# Patient Record
Sex: MALE | Race: WHITE | NOT HISPANIC OR LATINO | Employment: OTHER | URBAN - METROPOLITAN AREA
[De-identification: names, ages, dates, MRNs, and addresses within clinical notes are randomized per-mention and may not be internally consistent; named-entity substitution may affect disease eponyms.]

---

## 2017-01-27 ENCOUNTER — TRANSFERRED RECORDS (OUTPATIENT)
Dept: HEALTH INFORMATION MANAGEMENT | Facility: CLINIC | Age: 72
End: 2017-01-27

## 2017-02-09 ENCOUNTER — AMBULATORY - HEALTHEAST (OUTPATIENT)
Dept: PODIATRY | Age: 72
End: 2017-02-09

## 2017-02-09 ENCOUNTER — TRANSFERRED RECORDS (OUTPATIENT)
Dept: HEALTH INFORMATION MANAGEMENT | Facility: CLINIC | Age: 72
End: 2017-02-09

## 2017-02-09 DIAGNOSIS — M20.40 HAMMER TOE, UNSPECIFIED LATERALITY: ICD-10-CM

## 2017-02-09 DIAGNOSIS — L84 CORNS AND CALLOSITIES: ICD-10-CM

## 2017-02-09 DIAGNOSIS — M79.673 PAIN OF FOOT, UNSPECIFIED LATERALITY: ICD-10-CM

## 2017-02-09 DIAGNOSIS — L60.2 ONYCHAUXIS: ICD-10-CM

## 2017-02-10 PROBLEM — Z86.0100 HISTORY OF COLONIC POLYPS: Status: ACTIVE | Noted: 2017-02-10

## 2017-02-14 DIAGNOSIS — J20.9 ACUTE BRONCHITIS, UNSPECIFIED ORGANISM: ICD-10-CM

## 2017-02-14 RX ORDER — ALBUTEROL SULFATE 90 UG/1
2 AEROSOL, METERED RESPIRATORY (INHALATION) EVERY 4 HOURS PRN
Qty: 1 INHALER | Refills: 1 | Status: SHIPPED | OUTPATIENT
Start: 2017-02-14 | End: 2017-02-28

## 2017-02-28 ENCOUNTER — COMMUNICATION - HEALTHEAST (OUTPATIENT)
Dept: ADMINISTRATIVE | Facility: CLINIC | Age: 72
End: 2017-02-28

## 2017-02-28 DIAGNOSIS — J20.9 ACUTE BRONCHITIS, UNSPECIFIED ORGANISM: ICD-10-CM

## 2017-02-28 RX ORDER — ALBUTEROL SULFATE 90 UG/1
2 AEROSOL, METERED RESPIRATORY (INHALATION) EVERY 4 HOURS PRN
Qty: 1 INHALER | Refills: 1 | Status: SHIPPED | OUTPATIENT
Start: 2017-02-28 | End: 2020-02-13

## 2017-04-13 ENCOUNTER — TRANSFERRED RECORDS (OUTPATIENT)
Dept: HEALTH INFORMATION MANAGEMENT | Facility: CLINIC | Age: 72
End: 2017-04-13

## 2017-04-13 ENCOUNTER — AMBULATORY - HEALTHEAST (OUTPATIENT)
Dept: PODIATRY | Age: 72
End: 2017-04-13

## 2017-04-13 DIAGNOSIS — M20.40 HAMMER TOE, UNSPECIFIED LATERALITY: ICD-10-CM

## 2017-04-13 DIAGNOSIS — L84 CORNS AND CALLOSITIES: ICD-10-CM

## 2017-04-13 DIAGNOSIS — L60.2 ONYCHAUXIS: ICD-10-CM

## 2017-04-13 DIAGNOSIS — M79.673 PAIN OF FOOT, UNSPECIFIED LATERALITY: ICD-10-CM

## 2017-04-27 ENCOUNTER — AMBULATORY - HEALTHEAST (OUTPATIENT)
Dept: CARDIOLOGY | Facility: CLINIC | Age: 72
End: 2017-04-27

## 2017-05-02 ENCOUNTER — TRANSFERRED RECORDS (OUTPATIENT)
Dept: HEALTH INFORMATION MANAGEMENT | Facility: CLINIC | Age: 72
End: 2017-05-02

## 2017-05-02 ENCOUNTER — OFFICE VISIT - HEALTHEAST (OUTPATIENT)
Dept: CARDIOLOGY | Facility: CLINIC | Age: 72
End: 2017-05-02

## 2017-05-02 ENCOUNTER — AMBULATORY - HEALTHEAST (OUTPATIENT)
Dept: CARDIOLOGY | Facility: CLINIC | Age: 72
End: 2017-05-02

## 2017-05-02 DIAGNOSIS — E78.2 MIXED HYPERLIPIDEMIA: ICD-10-CM

## 2017-05-02 DIAGNOSIS — I48.0 PAROXYSMAL ATRIAL FIBRILLATION (H): ICD-10-CM

## 2017-05-02 LAB
ATRIAL RATE - MUSE: 258 BPM
DIASTOLIC BLOOD PRESSURE - MUSE: NORMAL MMHG
INTERPRETATION ECG - MUSE: NORMAL
P AXIS - MUSE: NORMAL DEGREES
PR INTERVAL - MUSE: NORMAL MS
QRS DURATION - MUSE: 90 MS
QT - MUSE: 446 MS
QTC - MUSE: 477 MS
R AXIS - MUSE: 24 DEGREES
SYSTOLIC BLOOD PRESSURE - MUSE: NORMAL MMHG
T AXIS - MUSE: 20 DEGREES
VENTRICULAR RATE- MUSE: 69 BPM

## 2017-05-02 ASSESSMENT — MIFFLIN-ST. JEOR: SCORE: 1800.14

## 2017-05-03 ENCOUNTER — HOSPITAL ENCOUNTER (OUTPATIENT)
Dept: CARDIOLOGY | Facility: CLINIC | Age: 72
Discharge: HOME | End: 2017-05-03
Attending: INTERNAL MEDICINE

## 2017-05-03 DIAGNOSIS — I48.0 PAROXYSMAL ATRIAL FIBRILLATION (H): ICD-10-CM

## 2017-05-09 ENCOUNTER — AMBULATORY - HEALTHEAST (OUTPATIENT)
Dept: CARDIOLOGY | Facility: CLINIC | Age: 72
End: 2017-05-09

## 2017-05-09 DIAGNOSIS — I48.0 PAROXYSMAL ATRIAL FIBRILLATION (H): ICD-10-CM

## 2017-05-24 ENCOUNTER — TRANSFERRED RECORDS (OUTPATIENT)
Dept: HEALTH INFORMATION MANAGEMENT | Facility: CLINIC | Age: 72
End: 2017-05-24

## 2017-05-24 ENCOUNTER — AMBULATORY - HEALTHEAST (OUTPATIENT)
Dept: CARDIOLOGY | Facility: CLINIC | Age: 72
End: 2017-05-24

## 2017-05-24 DIAGNOSIS — I48.0 PAROXYSMAL ATRIAL FIBRILLATION (H): ICD-10-CM

## 2017-05-24 LAB
ATRIAL RATE - MUSE: 416 BPM
DIASTOLIC BLOOD PRESSURE - MUSE: NORMAL MMHG
INTERPRETATION ECG - MUSE: NORMAL
P AXIS - MUSE: NORMAL DEGREES
PR INTERVAL - MUSE: NORMAL MS
QRS DURATION - MUSE: 88 MS
QT - MUSE: 442 MS
QTC - MUSE: 455 MS
R AXIS - MUSE: 11 DEGREES
SYSTOLIC BLOOD PRESSURE - MUSE: NORMAL MMHG
T AXIS - MUSE: 31 DEGREES
VENTRICULAR RATE- MUSE: 64 BPM

## 2017-05-24 ASSESSMENT — MIFFLIN-ST. JEOR: SCORE: 1800.14

## 2017-06-21 ENCOUNTER — AMBULATORY - HEALTHEAST (OUTPATIENT)
Dept: PODIATRY | Age: 72
End: 2017-06-21

## 2017-06-21 ENCOUNTER — TRANSFERRED RECORDS (OUTPATIENT)
Dept: HEALTH INFORMATION MANAGEMENT | Facility: CLINIC | Age: 72
End: 2017-06-21

## 2017-06-21 DIAGNOSIS — M79.673 PAIN OF FOOT, UNSPECIFIED LATERALITY: ICD-10-CM

## 2017-06-21 DIAGNOSIS — L60.2 ONYCHAUXIS: ICD-10-CM

## 2017-06-21 DIAGNOSIS — M20.40 HAMMER TOE, UNSPECIFIED LATERALITY: ICD-10-CM

## 2017-06-21 DIAGNOSIS — L84 CORNS AND CALLOSITIES: ICD-10-CM

## 2017-08-30 ENCOUNTER — TRANSFERRED RECORDS (OUTPATIENT)
Dept: HEALTH INFORMATION MANAGEMENT | Facility: CLINIC | Age: 72
End: 2017-08-30

## 2017-08-30 ENCOUNTER — AMBULATORY - HEALTHEAST (OUTPATIENT)
Dept: PODIATRY | Age: 72
End: 2017-08-30

## 2017-08-30 DIAGNOSIS — L60.2 ONYCHAUXIS: ICD-10-CM

## 2017-08-30 DIAGNOSIS — M20.40 HAMMER TOE, UNSPECIFIED LATERALITY: ICD-10-CM

## 2017-08-30 DIAGNOSIS — M79.673 PAIN OF FOOT, UNSPECIFIED LATERALITY: ICD-10-CM

## 2017-08-30 DIAGNOSIS — L84 CORNS AND CALLOSITIES: ICD-10-CM

## 2017-08-30 ASSESSMENT — MIFFLIN-ST. JEOR: SCORE: 1800.14

## 2017-09-19 ENCOUNTER — COMMUNICATION - HEALTHEAST (OUTPATIENT)
Dept: ADMINISTRATIVE | Facility: CLINIC | Age: 72
End: 2017-09-19

## 2017-10-18 ENCOUNTER — ALLIED HEALTH/NURSE VISIT (OUTPATIENT)
Dept: FAMILY MEDICINE | Facility: CLINIC | Age: 72
End: 2017-10-18

## 2017-10-18 VITALS — TEMPERATURE: 96.7 F

## 2017-10-18 DIAGNOSIS — Z23 NEED FOR VACCINATION: Primary | ICD-10-CM

## 2017-10-18 NOTE — NURSING NOTE
"Injectable Influenza Immunization Documentation    1.  Has the patient received the information for the injectable influenza vaccine? YES     2. Is the patient 6 months of age or older? YES     3. Does the patient have any of the following contraindications?         Severe allergy to eggs? No     Severe allergic reaction to previous influenza vaccines? No   Severe allergy to latex? No       History of Guillain-Bourbon syndrome? No     Currently have a temperature greater than 100.4F? No        4.  Severely egg allergic patients should have flu vaccine eligibility assessed by an MD, RN, or pharmacist, and those who received flu vaccine should be observed for 15 min by an MD, RN, Pharmacist, Medical Technician, or member of clinic staff.\": YES    5. Latex-allergic patients should be given latex-free influenza vaccine Yes. Please reference the Vaccine latex table to determine if your clinic s product is latex-containing.       Vaccination given by November Paw, RMA             "

## 2017-10-18 NOTE — MR AVS SNAPSHOT
After Visit Summary   10/18/2017    Giacomo Schneider    MRN: [id number]           Patient Information     Date Of Birth          1945        Visit Information        Provider Department      10/18/2017 9:40 AM [facility] FLU [facility]INIC [facility]        Today's Diagnoses     Need for vaccination    -  1       Follow-ups after your visit        Who to contact     Please call your clinic at [phone] to:    Ask questions about your health    Make or cancel appointments    Discuss your medicines    Learn about your test results    Speak to your doctor   If you have compliments or concerns about an experience at your clinic, or if you wish to file a complaint, please contact [facility] Patient Relations at [phone] or email us at [email or web address]         Additional Information About Your Visit        MyChart Information     [employer] is an electronic gateway that provides easy, online access to your medical records. With [employer], you can request a clinic appointment, read your test results, renew a prescription or communicate with your care team.     To sign up for [facility] visit the website at [email or web address]   You will be asked to enter the access code listed below, as well as some personal information. Please follow the directions to create your username and password.     Your access code is: [email or web address]  Expires: 2018 11:40 AM     Your access code will  in 90 days. If you need help or a new code, please contact your [facility] or call [phone] for assistance.        Care EveryWhere ID     This is your Care EveryWhere ID. This could be used by other organizations to access your [place] medical records  [id number]        Your Vitals Were     Temperature                   96.7  F (35.9  C) (Tympanic)            Blood Pressure from Last 3 Encounters:   16 133/85   16 137/78   11/24/15  112/74    Weight from Last 3 Encounters:   12/06/16 230 lb (104.3 kg)   06/02/16 231 lb 12.8 oz (105.1 kg)   11/24/15 231 lb (104.8 kg)              We Performed the Following     ADMIN VACCINE, INITIAL     FLU VACCINE, INCREASED ANTIGEN, PRESV FREE        Primary Care Provider Office Phone # Fax #    Giovani Ponce [phone]       [address] [address]        Equal Access to Services     ROD CONNORS : Hadii aad ku hadasho Soomaali, waaxda luqadaha, qaybta kaalmada adeegyada, waxay idiin hayaan adeeg breezy francisco . So [facility] [phone].    ATENCIÓN: Si habla español, tiene a blair disposición servicios gratuitos de asistencia lingüística. [facility] [phone].    We comply with applicable federal civil rights laws and [place] laws. We do not discriminate on the basis of race, color, national origin, age, disability, sex, sexual orientation, or gender identity.            Thank you!     Thank you for choosing [facility]  for your care. Our goal is always to provide you with excellent care. Hearing back from our patients is one way we can continue to improve our services. Please take a few minutes to complete the written survey that you may receive in the mail after your visit with us. Thank you!             Your Updated Medication List - Protect others around you: Learn how to safely use, store and throw away your medicines at www.disposemymeds.org.          This list is accurate as of: 10/18/17 11:40 AM.  Always use your most recent med list.                   Brand Name Dispense Instructions for use Diagnosis    albuterol 108 (90 BASE) MCG/ACT Inhaler    PROAIR HFA/PROVENTIL HFA/VENTOLIN HFA    1 Inhaler    Inhale 2 puffs into the lungs every 4 hours as needed for shortness of breath / dyspnea or wheezing    Acute bronchitis, unspecified organism       aspirin  MG EC tablet      Take 1 tablet daily        omeprazole 20 MG CR capsule    priLOSEC     180 capsule    Take 1 capsule (20 mg) by mouth 2 times daily    Gastroesophageal reflux disease without esophagitis       simvastatin 40 MG tablet    ZOCOR    90 tablet    Take 1 tablet (40 mg) by mouth At Bedtime    Hyperlipidemia, unspecified hyperlipidemia type       sotalol 80 MG tablet    BETAPACE     Take 1 tablet twice daily.

## 2017-11-02 ENCOUNTER — AMBULATORY - HEALTHEAST (OUTPATIENT)
Dept: PODIATRY | Age: 72
End: 2017-11-02

## 2017-11-02 DIAGNOSIS — M79.673 PAIN OF FOOT, UNSPECIFIED LATERALITY: ICD-10-CM

## 2017-11-02 DIAGNOSIS — L60.2 ONYCHAUXIS: ICD-10-CM

## 2017-11-02 DIAGNOSIS — M20.40 HAMMER TOE, UNSPECIFIED LATERALITY: ICD-10-CM

## 2017-11-02 DIAGNOSIS — L84 PRE-ULCERATIVE CORN OR CALLOUS: ICD-10-CM

## 2017-11-02 ASSESSMENT — MIFFLIN-ST. JEOR: SCORE: 1800.14

## 2017-11-22 ENCOUNTER — RECORDS - HEALTHEAST (OUTPATIENT)
Dept: ADMINISTRATIVE | Facility: OTHER | Age: 72
End: 2017-11-22

## 2017-11-30 ENCOUNTER — OFFICE VISIT - HEALTHEAST (OUTPATIENT)
Dept: CARDIOLOGY | Facility: CLINIC | Age: 72
End: 2017-11-30

## 2017-11-30 DIAGNOSIS — I10 ESSENTIAL HYPERTENSION: ICD-10-CM

## 2017-11-30 DIAGNOSIS — E78.2 MIXED HYPERLIPIDEMIA: ICD-10-CM

## 2017-11-30 DIAGNOSIS — I48.19 PERSISTENT ATRIAL FIBRILLATION (H): ICD-10-CM

## 2017-11-30 ASSESSMENT — MIFFLIN-ST. JEOR: SCORE: 1778.59

## 2018-01-11 ENCOUNTER — AMBULATORY - HEALTHEAST (OUTPATIENT)
Dept: PODIATRY | Age: 73
End: 2018-01-11

## 2018-01-11 ENCOUNTER — TRANSFERRED RECORDS (OUTPATIENT)
Dept: HEALTH INFORMATION MANAGEMENT | Facility: CLINIC | Age: 73
End: 2018-01-11

## 2018-01-11 DIAGNOSIS — M79.673 PAIN OF FOOT, UNSPECIFIED LATERALITY: ICD-10-CM

## 2018-01-11 DIAGNOSIS — L60.2 ONYCHAUXIS: ICD-10-CM

## 2018-01-11 ASSESSMENT — MIFFLIN-ST. JEOR: SCORE: 1778.59

## 2018-01-22 DIAGNOSIS — K21.9 GASTROESOPHAGEAL REFLUX DISEASE WITHOUT ESOPHAGITIS: ICD-10-CM

## 2018-01-22 DIAGNOSIS — E78.5 HYPERLIPIDEMIA, UNSPECIFIED HYPERLIPIDEMIA TYPE: ICD-10-CM

## 2018-01-22 RX ORDER — SIMVASTATIN 40 MG
40 TABLET ORAL AT BEDTIME
Qty: 90 TABLET | Refills: 3 | Status: SHIPPED | OUTPATIENT
Start: 2018-01-22 | End: 2018-04-12

## 2018-03-15 ENCOUNTER — AMBULATORY - HEALTHEAST (OUTPATIENT)
Dept: PODIATRY | Age: 73
End: 2018-03-15

## 2018-03-15 ENCOUNTER — TRANSFERRED RECORDS (OUTPATIENT)
Dept: HEALTH INFORMATION MANAGEMENT | Facility: CLINIC | Age: 73
End: 2018-03-15

## 2018-03-15 DIAGNOSIS — M20.40 HAMMER TOE, UNSPECIFIED LATERALITY: ICD-10-CM

## 2018-03-15 DIAGNOSIS — L60.2 ONYCHAUXIS: ICD-10-CM

## 2018-03-15 DIAGNOSIS — M79.673 PAIN OF FOOT, UNSPECIFIED LATERALITY: ICD-10-CM

## 2018-03-15 DIAGNOSIS — L84 PRE-ULCERATIVE CORN OR CALLOUS: ICD-10-CM

## 2018-04-12 ENCOUNTER — OFFICE VISIT (OUTPATIENT)
Dept: FAMILY MEDICINE | Facility: CLINIC | Age: 73
End: 2018-04-12
Payer: COMMERCIAL

## 2018-04-12 VITALS
HEART RATE: 69 BPM | RESPIRATION RATE: 20 BRPM | TEMPERATURE: 97.6 F | HEIGHT: 72 IN | DIASTOLIC BLOOD PRESSURE: 85 MMHG | WEIGHT: 228.4 LBS | OXYGEN SATURATION: 100 % | SYSTOLIC BLOOD PRESSURE: 145 MMHG | BODY MASS INDEX: 30.94 KG/M2

## 2018-04-12 DIAGNOSIS — K21.9 GASTROESOPHAGEAL REFLUX DISEASE WITHOUT ESOPHAGITIS: ICD-10-CM

## 2018-04-12 DIAGNOSIS — Z00.00 WELLNESS EXAMINATION: Primary | ICD-10-CM

## 2018-04-12 DIAGNOSIS — E78.5 HYPERLIPIDEMIA, UNSPECIFIED HYPERLIPIDEMIA TYPE: ICD-10-CM

## 2018-04-12 LAB
BUN SERPL-MCNC: 19.9 MG/DL (ref 7–21)
CALCIUM SERPL-MCNC: 9.7 MG/DL (ref 8.5–10.1)
CHLORIDE SERPLBLD-SCNC: 101.8 MMOL/L (ref 98–110)
CHOLEST SERPL-MCNC: 158.3 MG/DL (ref 0–200)
CHOLEST/HDLC SERPL: 3.6 {RATIO} (ref 0–5)
CO2 SERPL-SCNC: 26.5 MMOL/L (ref 20–32)
CREAT SERPL-MCNC: 1.1 MG/DL (ref 0.7–1.3)
GFR SERPL CREATININE-BSD FRML MDRD: 69.5 ML/MIN/1.7 M2
GLUCOSE SERPL-MCNC: 104.8 MG'DL (ref 70–99)
HBA1C MFR BLD: 5.9 % (ref 4.1–5.7)
HDLC SERPL-MCNC: 43.9 MG/DL
LDLC SERPL CALC-MCNC: 77 MG/DL (ref 0–129)
POTASSIUM SERPL-SCNC: 4.4 MMOL/DL (ref 3.2–4.6)
SODIUM SERPL-SCNC: 137 MMOL/L (ref 132–142)
TRIGL SERPL-MCNC: 186.3 MG/DL (ref 0–150)
VLDL CHOLESTEROL: 37.3 MG/DL (ref 7–32)

## 2018-04-12 RX ORDER — SIMVASTATIN 40 MG
40 TABLET ORAL AT BEDTIME
Qty: 90 TABLET | Refills: 3 | Status: SHIPPED | OUTPATIENT
Start: 2018-04-12 | End: 2019-05-13

## 2018-04-12 RX ORDER — METOPROLOL TARTRATE 50 MG
50 TABLET ORAL 2 TIMES DAILY
COMMUNITY
End: 2019-06-10

## 2018-04-12 NOTE — PROGRESS NOTES
Medication Management Note                                                       Giacomo was referred by Dr. Ponce for pharmacy services for proton pump inhibitor therapy taper.     MEDICATION REVIEW:  Discussed all medication indications, dosage and effectiveness, adverse effects, and adherence with patient/caregiver.    Pt had meds with them: no  Pt had med list with them: no  Pt was knowledgeable about meds: yes  Medications set up by: himself  Medications administered by someone else (e.g., LTCF): No  Pt uses a medication box or automated dispenser: yes  Called pharmacy to obtain or clarify med list:  no  Called HHN or LTC[facility] to obtain or clarify med list:  no    Medication Discrepancies  Medications on EMR med list that pt is NOT taking:  none  Medications pt IS taking that are NOT on EMR med list (e.g., from specialist, hospital): none  OTC meds/ dietary supplements pt taking on own that are NOT on EMR med list:  none  Dosage listed differently than how patient is taking: none  Frequency listed differently than how patient is taking: none  Duplicate medication on list (two occurrences of the same medication):  none  TOTAL NUMBER OF MEDICATION DISCREPANCIES:  0    Subjective                                                       Patient reports the following problems or concerns with their medications:  none  Patient reports the following adverse reactions to medications:  none  Pt reports missing doses:  1 time per month    Additional subjective information (e.g., reason for visit, frequency of PRNs, reasons meds were D/C ed):    Patient states he has minimal heartburn symptoms at this time. He is currently taking omeprazole 20 mg twice daily. He is in agreement to begin a taper of his PPI. He has been on this dose of omeprazole for roughly 10 years per chart review.     Objective                                                       Patient Active Problem List   Diagnosis     Atrial fibrillation (H)     Basal  cell carcinoma of skin     Esophageal reflux     Hyperlipidemia     Health Care Home     Obstructive sleep apnea     Spermatocele     Impaired fasting glucose     Abnormal abdominal CT scan     BPH (benign prostatic hyperplasia)     H/O cardiovascular stress test     History of colonic polyps       Current Outpatient Prescriptions   Medication Sig Dispense Refill     metoprolol tartrate (LOPRESSOR) 50 MG tablet Take 50 mg by mouth 2 times daily       simvastatin (ZOCOR) 40 MG tablet Take 1 tablet (40 mg) by mouth At Bedtime 90 tablet 3     omeprazole (PRILOSEC) 20 MG CR capsule Take 1 capsule (20 mg) by mouth daily 90 capsule 3     albuterol (PROAIR HFA/PROVENTIL HFA/VENTOLIN HFA) 108 (90 BASE) MCG/ACT Inhaler Inhale 2 puffs into the lungs every 4 hours as needed for shortness of breath / dyspnea or wheezing 1 Inhaler 1     aspirin  MG tablet Take 1 tablet daily       [DISCONTINUED] simvastatin (ZOCOR) 40 MG tablet Take 1 tablet (40 mg) by mouth At Bedtime 90 tablet 3       Social History   Substance Use Topics     Smoking status: Current Every Day Smoker     Types: Cigars     Smokeless tobacco: Never Used      Comment: one cigar per day.     Alcohol use Yes      Comment: rare       Estimated Creatinine Clearance: 72.8 mL/min (based on Cr of 1.1).    Lab Results   Component Value Date    A1C 5.9 04/12/2018    A1C 5.9 12/06/2016    A1C 5.8 01/28/2014    A1C 6.0 10/22/2012     Last Basic Metabolic Panel:  Lab Results   Component Value Date    .0 04/12/2018      Lab Results   Component Value Date    POTASSIUM 4.4 04/12/2018     Lab Results   Component Value Date    CHLORIDE 101.8 04/12/2018     Lab Results   Component Value Date    EMILY 9.7 04/12/2018     Lab Results   Component Value Date    CO2 26.5 04/12/2018     Lab Results   Component Value Date    BUN 19.9 04/12/2018     Lab Results   Component Value Date    CR 1.1 04/12/2018     Lab Results   Component Value Date    .8 04/12/2018       BP  Readings from Last 3 Encounters:   04/12/18 145/85   12/06/16 133/85   06/02/16 137/78       The 10-year ASCVD risk score ([place] ROJAS Jr, et al., 2013) is: 34.2%    Values used to calculate the score:      Age: 72 years      Sex: Male      Is Non- : No      Diabetic: No      Tobacco smoker: Yes      Systolic Blood Pressure: 145 mmHg      Is BP treated: Yes      HDL Cholesterol: 43.9 mg/dL      Total Cholesterol: 158.3 mg/dL      Assessment                                                       GERD -  controlled     Omeprazole 20 mg twice daily     Hyperlipidemia -  controlled, not optimized     Patient's 10-year ASCVD risk score is 34.2%. He's taking a moderate-intensity dose of simvastatin, no adverse effects.     Atrial fibrillation -  controlled     Sotalol 80 mg twice daily      Plan/Recommendations                                                       Updated medication list in the EMR; deleted meds patient no longer taking and added meds patient is now taking, and changed doses where there was a dose discrepancy.    All medications were reviewed and found to be indicated, effective, safe and convenient/ affordable unless drug therapy problem(s) was/were identified, as are described below.      Completed at this visit      Decrease omeprazole to 20 mg once daily       Discussed long-term PPI use    To be completed at a future visit    Smoking cessation education    Patient could benefit from PharmD referral for smoking cessation       PPI Taper    Options for treatment and/or follow-up care were reviewed with the patient.  Giacomo was engaged and actively involved in the decision making process, verbalized understanding of the options discussed, and was satisfied with the final plan.    Follow-up                                                       Patient should follow up with Dr. Ponce.  Patient was provided with written instructions/medication list via AVS.     Dr. Ponce was  provided the recommendations above  in clinic today and Dr. Ponce was available for supervision during this visit and is the authorizing prescriber for this visit through the pharmacist collaborative practice agreement.    Melanie Santoyo, PharmD Student      Drug therapy problems identified  1. Med: omeprazole - safety - dose too high - Resolution: Change dose; resolved  2. Med: nicotine patches - Indication - needs additional drug therapy - Resolution: initiate drug; deferred to future visit    # of medical conditions addressed: 3  # of medications addressed: 5  # of medication discrepancies identified: 0  # of DTP identified: 2  Time spent: 15 minutes  Level of service: 3 nc    The student acted as scribe and the encounter documented was completely performed by myself. I have reviewed and verified the student s documentation and found it to be correct and complete.  Genny Boles, Pharm.D.

## 2018-04-12 NOTE — NURSING NOTE
Medicare Wellness Visit  Health Risk Assessment        Visual Acuity:  Right Eye: 10/8   Left Eye: 10/16  Both Eyes: 10/10        FALL RISK ASSESSMENT 4/12/2018   Fallen 2 or more times in the past year? No   Any fall with injury in the past year? No            Health Risk Assessment / Review of Systems     Constitutional: Any fevers or night sweats? No     Eyes:  Vision problems   No     Hearing Do you feel you have hearing loss?   YES hard to hear in crowds/ with other noise in the room    Cardiovascular: Any chest pain, fast or irregular heart beat, calf pain with walking?     No           Respiratory:   Any breathing problems or cough?   No     Gastrointestinal: Any stomach or stool problems?   No      Genitourinary: Do you have difficulty controlling urination?   No     Muscles and Joints: Any joint stiffness or soreness?   No     Skin: Any concerning lesions or moles?   No     Nervous System: Any loss of strength or feeling, numbness or tingling, shaking, dizziness, or headache?  No     Mental Health: Any depression, anxiety or problems sleeping?    No     Cognition: Do you have any problems with your memory?  No     PHQ-2 Score:   PHQ-2 ( 1999 [employer]) 11/24/2015 9/4/2015   Q1: Little interest or pleasure in doing things 0 0   Q2: Feeling down, depressed or hopeless 0 0   PHQ-2 Score 0 0       PHQ-9 Score:   No flowsheet data found.         Medical Care     What other specialists or organizations are involved in your medical care?  Dr. Vee[facility]  Patient Care Team       Relationship Specialty Notifications Start End    Giovani Ponce MD PCP - General Family Practice  1/31/13     Phone: [phone] Fax: [phone]         [place] [address]                 Social History / Home Safety     Social History   Substance Use Topics     Smoking status: Current Every Day Smoker     Types: Cigars     Smokeless tobacco: Never Used      Comment: one cigar per day.     Alcohol use  "Yes      Comment: rare     Marital Status:  Who lives in your household? Wife    Does your home have any of the following safety concerns? Loose rugs in the hallway, no grab bars in the bathroom, no handrails on the stairs or have poorly lit areas?  No     Do you feel threatened or controlled by a partner, ex-partner or anyone in your life? No     Has anyone hurt you physically, for example by pushing, hitting, slapping or kicking you   or forcing you to have sex? No          Functional Status     Do you need help with dressing yourself, bathing, or walking?No     Do you need help with the phone, transportation, shopping, preparing meals, housework, laundry, medications or managing money?No       Risk Behaviors and Healthy Habits     History   Smoking Status     Current Every Day Smoker     Types: Cigars   Smokeless Tobacco     Never Used     Comment: one cigar per day.     How many servings of fruits and vegetables do you eat a day? 1    Exercise: 1 day/week for an average of      Do you frequently drive without a seatbelt? No     Do you use any other drugs? No         Do you use alcohol?No      Frailty Assessment            1. By yourself and note using aids, do you have difficulty walking up 10 steps without resting?  No  (1 for Yes, 0 for No)    2. By yourself and not using mobility aids, do you have any difficulty walking several hundred yards? No  (1 for Yes, 0 for No)    3. Have you lost 10 or more pounds unintentionally in the previous year? No  (If \"Yes\" and >5% weight loss, then score 1.  Score 0, if <5% weight loss or \"No\" weight loss)    4. How much of the times during the past 3 weeks did you feel tired? 5. None of the time (\"1\" or \"2\" are scored 1, others 0)    5.  A doctor told the patient they had the following illnesses:  High blood pressure (0-4 = score 0, 5-11= score 1)        Jonathan Krause, DANETTE    "

## 2018-04-12 NOTE — PROGRESS NOTES
Patient Active Problem List    Diagnosis Date Noted     History of colonic polyps 02/10/2017     Priority: Medium     Colonoscopy 1/27/2017 by [facility], Dr. Conroy, showing   Cecum:  One 2mm sessile polyp, removed  Transverse colon:  Three 3-7mm sessile polyps, removed  Sigmoid colon:  One 2mm sessile polyp, removed  Rectum:  One 4mm polyp, removed  Diverticulosis, internal hemorrhoids.         BPH (benign prostatic hyperplasia) 04/14/2015     Priority: Medium     Neg prostate bx 2009.         H/O cardiovascular stress test 04/14/2015     Priority: Medium     Negative in 2012       Impaired fasting glucose 01/28/2014     Priority: Medium     Atrial fibrillation (H) 03/20/2013     Priority: Medium     DR Vee @ [facility].         Basal cell carcinoma of skin 03/20/2013     Priority: Medium     Basal Cell Carcinoma Of The Skin 2009 (173.9); left cheek. excised by Dr Hernandez @ [facility]         Esophageal reflux 03/20/2013     Priority: Medium     Esophageal Reflux (530.81); benign endoscopy on EGD 6/22/04         Hyperlipidemia 03/20/2013     Priority: Medium     Tier 1  Diagnosis updated by automated process. Provider to review and confirm.       Health Care Home 03/20/2013     Priority: Medium     Tier 1  DX V65.8 REPLACED WITH 33475 HEALTH CARE HOME (04/08/2013)       Obstructive sleep apnea 03/20/2013     Priority: Medium     Obstructive Sleep Apnea (327.23); intolerant of cpap         Spermatocele 03/20/2013     Priority: Medium     Spermatocele (608.1); left testicle confirmed by ultrasound.          Abnormal abdominal CT scan 04/14/2015     Priority: Low     Inguinal hernias noted--planning repair fall 2015  Diverticulosis  Aortic and coronary atherosclerosis  bph       Nursing Notes:   Jonathan Krause, [facility]  4/12/2018  2:09 PM  Signed  Medicare Wellness Visit  Health Risk Assessment        Visual Acuity:  Right Eye: 10/8   Left Eye: 10/16  Both Eyes: 10/10        FALL RISK ASSESSMENT 4/12/2018   Fallen 2 or more times  in the past year? No   Any fall with injury in the past year? No            Health Risk Assessment / Review of Systems     Constitutional: Any fevers or night sweats? No     Eyes:  Vision problems   No     Hearing Do you feel you have hearing loss?   YES hard to hear in crowds/ with other noise in the room    Cardiovascular: Any chest pain, fast or irregular heart beat, calf pain with walking?     No           Respiratory:   Any breathing problems or cough?   No     Gastrointestinal: Any stomach or stool problems?   No      Genitourinary: Do you have difficulty controlling urination?   No     Muscles and Joints: Any joint stiffness or soreness?   No     Skin: Any concerning lesions or moles?   No     Nervous System: Any loss of strength or feeling, numbness or tingling, shaking, dizziness, or headache?  No     Mental Health: Any depression, anxiety or problems sleeping?    No     Cognition: Do you have any problems with your memory?  No     PHQ-2 Score:   PHQ-2 ( 1999 [employer]) 11/24/2015 9/4/2015   Q1: Little interest or pleasure in doing things 0 0   Q2: Feeling down, depressed or hopeless 0 0   PHQ-2 Score 0 0       PHQ-9 Score:   No flowsheet data found.         Medical Care     What other specialists or organizations are involved in your medical care?  Dr. Vee- [facility]  Patient Care Team       Relationship Specialty Notifications Start End    Giovani Ponce MD PCP - General Family Practice  1/31/13     Phone: [phone] Fax: [phone]         [address] [address]                 Social History / Home Safety     Social History   Substance Use Topics     Smoking status: Current Every Day Smoker     Types: Cigars     Smokeless tobacco: Never Used      Comment: one cigar per day.     Alcohol use Yes      Comment: rare     Marital Status:  Who lives in your household? Wife    Does your home have any of the following safety concerns? Loose rugs in the hallway, no grab bars  "in the bathroom, no handrails on the stairs or have poorly lit areas?  No     Do you feel threatened or controlled by a partner, ex-partner or anyone in your life? No     Has anyone hurt you physically, for example by pushing, hitting, slapping or kicking you   or forcing you to have sex? No          Functional Status     Do you need help with dressing yourself, bathing, or walking?No     Do you need help with the phone, transportation, shopping, preparing meals, housework, laundry, medications or managing money?No       Risk Behaviors and Healthy Habits     History   Smoking Status     Current Every Day Smoker     Types: Cigars   Smokeless Tobacco     Never Used     Comment: one cigar per day.     How many servings of fruits and vegetables do you eat a day? 1    Exercise: 1 day/week for an average of      Do you frequently drive without a seatbelt? No     Do you use any other drugs? No         Do you use alcohol?No      Frailty Assessment            1. By yourself and note using aids, do you have difficulty walking up 10 steps without resting?  No  (1 for Yes, 0 for No)    2. By yourself and not using mobility aids, do you have any difficulty walking several hundred yards? No  (1 for Yes, 0 for No)    3. Have you lost 10 or more pounds unintentionally in the previous year? No  (If \"Yes\" and >5% weight loss, then score 1.  Score 0, if <5% weight loss or \"No\" weight loss)    4. How much of the times during the past 3 weeks did you feel tired? 5. None of the time (\"1\" or \"2\" are scored 1, others 0)    5.  A doctor told the patient they had the following illnesses:  High blood pressure (0-4 = score 0, 5-11= score 1)        Jonathan Krause CMA    Chief Complaint   Patient presents with     Wellness Visit     Blood pressure 145/85, pulse 69, temperature 97.6  F (36.4  C), temperature source Oral, resp. rate 20, height 5' 11.5\" (181.6 cm), weight 228 lb 6.4 oz (103.6 kg), SpO2 100 %.     SUBJECTIVE:  Mehrdad Damienjanae is here for a " wellness visit.  His problem list, past medical history, family history, and social history were reviewed.  Review of systems is remarkable only for hearing loss.  He has some occasional rectal swelling, which is the most bothersome issue to him.  He has known hemorrhoids and some loose stools in general.  Otherwise, his review of systems is unremarkable.   He is active, playing softball and exercising regularly.   OBJECTIVE:   VITAL SIGNS:  Blood pressure is mildly elevated; it is borderline at 145/85.  His pulse is 69 and irregular.     HEENT:  TMs are perforated on the right.  There is mild serous fluid on the left.  Pupils are equal, round, and reactive to light.  Oropharynx is unremarkable.   NECK:  Supple, with no adenopathy or thyromegaly.   CHEST:  Clear.   HEART:  Irregularly irregular.  Pulse is 69.  No murmurs.   ABDOMEN:  Multiple well-healed scars.  His abdomen is diffusely firm, but I can't appreciate any masses.   GENITAL:  Normal descended testicles.  No hernia.   RECTAL:  He has hemorrhoids.   EXTREMITIES:  Full pulses.  No edema.  Hip and knee ROM is normal.   SKIN:  Unremarkable.   NEUROLOGIC:  Grossly nonfocal.     MUSCULOSKELETAL:  He has a mild kyphotic posture, but this is at baseline for him.   LABS:  Listed below.  His lipids are under good control.  His BMP is normal.  His A1C is mildly abnormal, but it has been stable for 5 years.   ASSESSMENT/PLAN:   1.  Medicare wellness exam.  He is actually quite well-controlled.  He has a living will at home and his wife will help him bring that into us.  No other preventive maintenance is indicated at this time.   2.  Dyslipidemia.  It is well-controlled.  I refilled simvastatin.    3.  Acid reflux.  We'll begin tapering off his PPI if possible; please see the pharmacy notes.      4.  AFib.  He has adequate rate control and is on aspirin, given low MITA-VASC score.  He'll follow up annually unless problems come up.  I encouraged him to stay active to  help prevent diabetes.       Results for orders placed or performed in visit on 04/12/18   Basic Metabolic Panel ([place])   Result Value Ref Range    Urea Nitrogen 19.9 7.0 - 21.0 mg/dL    Calcium 9.7 8.5 - 10.1 mg/dL    Chloride 101.8 98.0 - 110.0 mmol/L    Carbon Dioxide 26.5 20.0 - 32.0 mmol/L    Creatinine 1.1 0.7 - 1.3 mg/dL    Glucose 104.8 (H) 70.0 - 99.0 mg'dL    Potassium 4.4 3.2 - 4.6 mmol/dL    Sodium 137.0 132.0 - 142.0 mmol/L    GFR Estimate 69.5 >60.0 mL/min/1.7 m2    GFR Estimate If Black 84.2 >60.0 mL/min/1.7 m2   Lipid Panel ([place])   Result Value Ref Range    Cholesterol 158.3 0.0 - 200.0 mg/dL    Cholesterol/HDL Ratio 3.6 0.0 - 5.0    HDL Cholesterol 43.9 >40.0 mg/dL    LDL Cholesterol Calculated 77 0 - 129 mg/dL    Triglycerides 186.3 (H) 0.0 - 150.0 mg/dL    VLDL Cholesterol 37.3 (H) 7.0 - 32.0 mg/dL   Hemoglobin A1c ([facility])   Result Value Ref Range    Hemoglobin A1C 5.9 (H) 4.1 - 5.7 %

## 2018-04-12 NOTE — MR AVS SNAPSHOT
After Visit Summary   4/12/2018    Giacomo Schneider    MRN: [id number]           Patient Information     Date Of Birth          1945        Visit Information        Provider Department      4/12/2018 1:30 PM Giovani Ponce MD [facility]        Today's Diagnoses     Wellness examination    -  1    Hyperlipidemia, unspecified hyperlipidemia type        Gastroesophageal reflux disease without esophagitis          Care Instructions    MEDICARE PERSONAL PREVENTIVE SERVICES PLAN - IMMUNIZATIONS     Here are your recommended immunizations.  Take this home for your reference.                                                    IMMUNIZATIONS Description Recommend today?     Influenza (Flu shot) Prevents flu; should get every year No; is up to date.   PCV 13 Pneumonia vaccination; you get it once No; is up to date.   PPSV 23 Second pneumonia vaccination; usually get it 1 year after PCV 13 No; is up to date.   Zoster (Shingles) Prevents shingles; you get it once  (Check with Part D insurance for coverage, must receive at a pharmacy, not clinic) No: is not indicated today.   Tetanus Prevents tetanus; once every 10 years No; is up to date.         Kegel Exercises  Kegel exercises don t need special clothing or equipment. They re easy to learn and simple to do. And if you do them right, no one can tell you re doing them, so they can be done almost anywhere. Your healthcare provider, nurse, or physical therapist can answer any questions you have and help you get started.    A weak pelvic floor   The pelvic floor muscles may weaken due to aging, pregnancy and vaginal childbirth, injury, surgery, chronic cough, or lack of exercise. If the pelvic floor is weak, your bladder and other pelvic organs may sag out of place. The urethra may also open too easily and allow urine to leak out. Kegel exercises can help you strengthen your pelvic floor muscles. Then they can better support the pelvic organs and  control urine flow.  How Kegel exercises are done  Try each of the Kegel exercises described below. When you re doing them, try not to move your leg, buttock, or stomach muscles.    Contract as if you were stopping your urine stream. But do it when you re not urinating.    Tighten your rectum as if trying not to pass gas. Contract your anus, but don t move your buttocks.    You may place a finger or 2 in the vagina and squeeze your finger with your vagina to learn which muscles to tighten.  Try to hold each Kegel for a slow count to 5. You probably won t be able to hold them for that long at first. But keep practicing. It will get easier as your pelvic floor gets stronger. Eventually, special weights that you place in your vagina may be recommended to help make your Kegels even more effective. Visit your healthcare provider if you have difficulties doing Kegel exercises.  Helpful hints  Here are some tips to follow:    Do your Kegels as often as you can. The more you do them, the faster you ll feel the results.    Pick an activity you do often as a reminder. For instance, do your Kegels every time you sit down.    Tighten your pelvic floor before you sneeze, get up from a chair, cough, laugh, or lift. This protects your pelvic floor from injury and can help prevent urine leakage.   Date Last Reviewed: 8/5/2015 2000-2017 [employer]. [address] [place] [address]. All rights reserved. This information is not intended as a substitute for professional medical care. Always follow your healthcare professional's instructions.                Follow-ups after your visit        Who to contact     Please call your clinic at [phone] to:    Ask questions about your health    Make or cancel appointments    Discuss your medicines    Learn about your test results    Speak to your doctor            Additional Information About Your Visit        [employer] Information     [employer] is an electronic  "gateway that provides easy, online access to your medical records. With [employer], you can request a clinic appointment, read your test results, renew a prescription or communicate with your care team.     To sign up for [employer] visit the website at [email or web address]   You will be asked to enter the access code listed below, as well as some personal information. Please follow the directions to create your username and password.     Your access code is: [email or web address]  Expires: 2018  2:19 PM     Your access code will  in 90 days. If you need help or a new code, please contact your [facility] or call [phone] for assistance.        Care EveryWhere ID     This is your Care EveryWhere ID. This could be used by other organizations to access your [facility] medical records  [id number]        Your Vitals Were     Pulse Temperature Respirations Height Pulse Oximetry BMI (Body Mass Index)    69 97.6  F (36.4  C) (Oral) 20 5' 11.5\" (181.6 cm) 100% 31.41 kg/m2       Blood Pressure from Last 3 Encounters:   18 145/85   16 133/85   16 137/78    Weight from Last 3 Encounters:   18 228 lb 6.4 oz (103.6 kg)   16 230 lb (104.3 kg)   16 231 lb 12.8 oz (105.1 kg)              We Performed the Following     Basic Metabolic Panel ([place])     Hemoglobin A1c ([facility])     Lipid Panel ([place])          Today's Medication Changes          These changes are accurate as of 18  2:19 PM.  If you have any questions, ask your nurse or doctor.               These medicines have changed or have updated prescriptions.        Dose/Directions    omeprazole 20 MG CR capsule   Commonly known as:  priLOSEC   This may have changed:  when to take this   Used for:  Gastroesophageal reflux disease without esophagitis   Changed by:  Giovani Ponce MD        Dose:  20 mg   Take 1 capsule (20 mg) by mouth daily   Quantity:  90 capsule   Refills:  3       "   Stop taking these medicines if you haven't already. Please contact your care team if you have questions.     sotalol 80 MG tablet   Commonly known as:  BETAPACE   Stopped by:  Giovani Ponce MD                Where to get your medicines      These medications were sent to [facility] [address] [place] [address] [address]  [address] [address]     Phone:  [phone]     omeprazole 20 MG CR capsule    simvastatin 40 MG tablet                Primary Care Provider Office Phone # Fax #    Giovani Ponce [phone]       [address] [address]        Equal Access to Services     [facility]: Hadii aad ku hadasho Soomaali, waaxda luqadaha, qaybta kaalmada adeegyada, waxraya noein neyda francisco . [facility] [phone].    ATENCIÓN: Si habla español, tiene a blair disposición servicios gratuitos de asistencia lingüística. [facility] [phone].    We comply with applicable federal civil rights laws and [place] laws. We do not discriminate on the basis of race, color, national origin, age, disability, sex, sexual orientation, or gender identity.            Thank you!     Thank you for choosing [facility]  for your care. Our goal is always to provide you with excellent care. Hearing back from our patients is one way we can continue to improve our services. Please take a few minutes to complete the written survey that you may receive in the mail after your visit with us. Thank you!             Your Updated Medication List - Protect others around you: Learn how to safely use, store and throw away your medicines at www.disposemymeds.org.          This list is accurate as of 4/12/18  2:19 PM.  Always use your most recent med list.                   Brand Name Dispense Instructions for use Diagnosis    albuterol 108 (90 Base) MCG/ACT Inhaler    PROAIR HFA/PROVENTIL HFA/VENTOLIN HFA    1 Inhaler    Inhale 2 puffs into the  lungs every 4 hours as needed for shortness of breath / dyspnea or wheezing    Acute bronchitis, unspecified organism       aspirin 325 MG EC tablet      Take 1 tablet daily        metoprolol tartrate 50 MG tablet    LOPRESSOR     Take 50 mg by mouth 2 times daily        omeprazole 20 MG CR capsule    priLOSEC    90 capsule    Take 1 capsule (20 mg) by mouth daily    Gastroesophageal reflux disease without esophagitis       simvastatin 40 MG tablet    ZOCOR    90 tablet    Take 1 tablet (40 mg) by mouth At Bedtime    Hyperlipidemia, unspecified hyperlipidemia type

## 2018-04-12 NOTE — LETTER
April 16, 2018      Giacomo Jennie  [id number] [address] [place] [id number]        Dear Giacomo,    Your labs look good. The kidney tests and blood salts are normal. Your fasting blood sugar and AIC test indicate prediabetes, but this has not progressed at all in the past 5 years. For example, your AIC was 6.0 5 years ago.  An AIC above 6.5 is the definition of diabetes.  Your cholesterol is acceptable.  Stay on the simvastatin. I did review the notes from the cardiologist regarding your atrial fibrillation, particularly the discussion re:  taking stronger blood thinners than aspirin to reduce the risk of stroke.  To be clear, your risk of having a stroke from atrial fibrillation is high enough to recommend stronger anticoagulation than aspirin, according to current guidelines.  Current risk calculators would say that your particular risk of stroke is 2.2% per year without any blood thinners.  The aspirin you are taking is possibly beneficial, lowering that risk by 25% or so.  Stronger blood thinner, like warfarin and new anticoagulants, lower the risk by an additional 50-60% compared to aspirin, but carry a higher risk of bleeding (around 1%/year), and a need for monitoring (warfarin) or greater cost (newer anticoagulants like Xarelto or Eliquis). According to the notes from the cardiologist, you expressed a clear preference to not use stronger anticoagulants.   I think this is understandable, but I wanted to be clear about the risks and benefits of each.  If you want to talk about this more, please come in for that discussion.  In the meantime, stay on your current medications, including aspirin 325 mg daily.       Thanks for your trust.     Saurabh Ponce MD     Please see below for your test results.    Resulted Orders   Basic Metabolic Panel ([place])   Result Value Ref Range    Urea Nitrogen 19.9 7.0 - 21.0 mg/dL    Calcium 9.7 8.5 - 10.1 mg/dL    Chloride 101.8 98.0 - 110.0 mmol/L    Carbon Dioxide 26.5  20.0 - 32.0 mmol/L    Creatinine 1.1 0.7 - 1.3 mg/dL    Glucose 104.8 (H) 70.0 - 99.0 mg'dL    Potassium 4.4 3.2 - 4.6 mmol/dL    Sodium 137.0 132.0 - 142.0 mmol/L    GFR Estimate 69.5 >60.0 mL/min/1.7 m2    GFR Estimate If Black 84.2 >60.0 mL/min/1.7 m2   Lipid Panel ([place])   Result Value Ref Range    Cholesterol 158.3 0.0 - 200.0 mg/dL    Cholesterol/HDL Ratio 3.6 0.0 - 5.0    HDL Cholesterol 43.9 >40.0 mg/dL    LDL Cholesterol Calculated 77 0 - 129 mg/dL    Triglycerides 186.3 (H) 0.0 - 150.0 mg/dL    VLDL Cholesterol 37.3 (H) 7.0 - 32.0 mg/dL   Hemoglobin A1c ([facility])   Result Value Ref Range    Hemoglobin A1C 5.9 (H) 4.1 - 5.7 %

## 2018-04-12 NOTE — PATIENT INSTRUCTIONS
MEDICARE PERSONAL PREVENTIVE SERVICES PLAN - IMMUNIZATIONS     Here are your recommended immunizations.  Take this home for your reference.                                                    IMMUNIZATIONS Description Recommend today?     Influenza (Flu shot) Prevents flu; should get every year No; is up to date.   PCV 13 Pneumonia vaccination; you get it once No; is up to date.   PPSV 23 Second pneumonia vaccination; usually get it 1 year after PCV 13 No; is up to date.   Zoster (Shingles) Prevents shingles; you get it once  (Check with Part D insurance for coverage, must receive at a pharmacy, not clinic) No: is not indicated today.   Tetanus Prevents tetanus; once every 10 years No; is up to date.         Kegel Exercises  Kegel exercises don t need special clothing or equipment. They re easy to learn and simple to do. And if you do them right, no one can tell you re doing them, so they can be done almost anywhere. Your healthcare provider, nurse, or physical therapist can answer any questions you have and help you get started.    A weak pelvic floor   The pelvic floor muscles may weaken due to aging, pregnancy and vaginal childbirth, injury, surgery, chronic cough, or lack of exercise. If the pelvic floor is weak, your bladder and other pelvic organs may sag out of place. The urethra may also open too easily and allow urine to leak out. Kegel exercises can help you strengthen your pelvic floor muscles. Then they can better support the pelvic organs and control urine flow.  How Kegel exercises are done  Try each of the Kegel exercises described below. When you re doing them, try not to move your leg, buttock, or stomach muscles.    Contract as if you were stopping your urine stream. But do it when you re not urinating.    Tighten your rectum as if trying not to pass gas. Contract your anus, but don t move your buttocks.    You may place a finger or 2 in the vagina and squeeze your finger with your vagina to learn  which muscles to tighten.  Try to hold each Kegel for a slow count to 5. You probably won t be able to hold them for that long at first. But keep practicing. It will get easier as your pelvic floor gets stronger. Eventually, special weights that you place in your vagina may be recommended to help make your Kegels even more effective. Visit your healthcare provider if you have difficulties doing Kegel exercises.  Helpful hints  Here are some tips to follow:    Do your Kegels as often as you can. The more you do them, the faster you ll feel the results.    Pick an activity you do often as a reminder. For instance, do your Kegels every time you sit down.    Tighten your pelvic floor before you sneeze, get up from a chair, cough, laugh, or lift. This protects your pelvic floor from injury and can help prevent urine leakage.   Date Last Reviewed: 8/5/2015 2000-2017 [employer]. [address] [place] [address], [place] [address]. All rights reserved. This information is not intended as a substitute for professional medical care. Always follow your healthcare professional's instructions.

## 2018-04-14 NOTE — PROGRESS NOTES
No:  Please mail with labs:    Mehrdad  Your labs look good.   The kidney tests and blood salts are normal.  Your fasting blood sugar and AIC test indicate prediabetes, but this has not progressed at all in the past 5 years. For example, your AIC was 6.0 5 years ago.  An AIC above 6.5 is the definition of diabetes.    Your cholesterol is acceptable.  Stay on the simvastatin.  I did review the notes from the cardiologist regarding your atrial fibrillation, particularly the discussion re:  taking stronger blood thinners than aspirin to reduce the risk of stroke.  To be clear, your risk of having a stroke from atrial fibrillation is high enough to recommend stronger anticoagulation than aspirin, according to current guidelines.  Current risk calculators would say that your particular risk of stroke is 2.2% per year without any blood thinners.  The aspirin you are taking is possibly beneficial, lowering that risk by 25% or so.  Stronger blood thinner, like warfarin and new anticoagulants, lower the risk by an additional 50-60% compared to aspirin, but carry a higher risk of bleeding (around 1%/year), and a need for monitoring (warfarin) or greater cost (newer anticoagulants like Xarelto or Eliquis).   According to the notes from the cardiologist, you expressed a clear preference to not use stronger anticoagulants.   I think this is understandable, but I wanted to be clear about the risks and benefits of each.  If you want to talk about this more, please come in for that discussion.  In the meantime, stay on your current medications, including aspirin 325 mg daily.    Thanks for your trust.  Saurabh Ponce MD

## 2018-05-08 ENCOUNTER — COMMUNICATION - HEALTHEAST (OUTPATIENT)
Dept: ADMINISTRATIVE | Facility: CLINIC | Age: 73
End: 2018-05-08

## 2018-05-16 ENCOUNTER — AMBULATORY - HEALTHEAST (OUTPATIENT)
Dept: PODIATRY | Age: 73
End: 2018-05-16

## 2018-05-16 ENCOUNTER — TRANSFERRED RECORDS (OUTPATIENT)
Dept: HEALTH INFORMATION MANAGEMENT | Facility: CLINIC | Age: 73
End: 2018-05-16

## 2018-05-16 DIAGNOSIS — M79.673 PAIN OF FOOT, UNSPECIFIED LATERALITY: ICD-10-CM

## 2018-05-16 DIAGNOSIS — L60.2 ONYCHAUXIS: ICD-10-CM

## 2018-05-23 ENCOUNTER — COMMUNICATION - HEALTHEAST (OUTPATIENT)
Dept: ADMINISTRATIVE | Facility: CLINIC | Age: 73
End: 2018-05-23

## 2018-06-15 ENCOUNTER — TELEPHONE (OUTPATIENT)
Dept: FAMILY MEDICINE | Facility: CLINIC | Age: 73
End: 2018-06-15

## 2018-06-15 DIAGNOSIS — K21.9 GASTROESOPHAGEAL REFLUX DISEASE WITHOUT ESOPHAGITIS: ICD-10-CM

## 2018-06-15 NOTE — TELEPHONE ENCOUNTER
[facility] Medicine phone call message- general phone call:    Reason for call: Pt is calling wondering if he can go back to taking 2 capsules instead of one on medication omeprazole (PRILOSEC) 20 MG CR capsule, because his acid reflux is back.    Return call needed: Yes    OK to leave a message on voice mail? Yes    Primary language: English      needed? No    Call taken on Kecia 15, 2018 at 9:45 AM by Grisel Flores-Cardona

## 2018-06-15 NOTE — TELEPHONE ENCOUNTER
States his symptoms started to flare up about 10 days ago and he increased his omeprazole to 2 caps daily and that helped to relieve his symptoms. He would like to know if he could get a new rx with those directions. Please advise. /JOSE Landry  Routed to Dr. Ponce

## 2018-08-13 ENCOUNTER — OFFICE VISIT - HEALTHEAST (OUTPATIENT)
Dept: CARDIOLOGY | Facility: CLINIC | Age: 73
End: 2018-08-13

## 2018-08-13 DIAGNOSIS — E78.2 MIXED HYPERLIPIDEMIA: ICD-10-CM

## 2018-08-13 DIAGNOSIS — I10 ESSENTIAL HYPERTENSION: ICD-10-CM

## 2018-08-13 DIAGNOSIS — I48.19 PERSISTENT ATRIAL FIBRILLATION (H): ICD-10-CM

## 2018-08-13 ASSESSMENT — MIFFLIN-ST. JEOR: SCORE: 1801.27

## 2018-10-15 ENCOUNTER — ALLIED HEALTH/NURSE VISIT (OUTPATIENT)
Dept: FAMILY MEDICINE | Facility: CLINIC | Age: 73
End: 2018-10-15
Payer: COMMERCIAL

## 2018-10-15 VITALS — TEMPERATURE: 97.5 F

## 2018-10-15 DIAGNOSIS — Z23 NEED FOR VACCINATION: Primary | ICD-10-CM

## 2018-10-15 NOTE — NURSING NOTE
Injectable influenza vaccine documentation    1. Has the patient received the information for the influenza vaccine? YES    2. Does the patient have a severe allergy to eggs (Patients with a severe egg allergy should be assessed by a medical provider, RN, or clinical pharmacist. If they receive the influenza vaccine, please have them observed for 15 minutes.)? No    3. Has the patient had an allergic reaction to previous influenza vaccines? No    4. Has the patient had any severe allergic reactions to past influenza vaccines ? No       5. Does patient have a history of Guillain-Ferris syndrome? No      Based on responses above, I administered the influenza vaccine.  Katherine Nascimento, CMA

## 2018-10-15 NOTE — MR AVS SNAPSHOT
After Visit Summary   10/15/2018    Giacomo Schneider    MRN: [id number]           Patient Information     Date Of Birth          1945        Visit Information        Provider Department      10/15/2018 9:30 AM Nurse, [facility]        Today's Diagnoses     Need for vaccination    -  1       Follow-ups after your visit        Who to contact     Please call your clinic at [phone] to:    Ask questions about your health    Make or cancel appointments    Discuss your medicines    Learn about your test results    Speak to your doctor            Additional Information About Your Visit        Care EveryWhere ID     This is your Care EveryWhere ID. This could be used by other organizations to access your [facility] medical records  [id number]        Your Vitals Were     Temperature                   97.5  F (36.4  C) (Oral)            Blood Pressure from Last 3 Encounters:   04/12/18 145/85   12/06/16 133/85   06/02/16 137/78    Weight from Last 3 Encounters:   04/12/18 228 lb 6.4 oz (103.6 kg)   12/06/16 230 lb (104.3 kg)   06/02/16 231 lb 12.8 oz (105.1 kg)              We Performed the Following     ADMIN VACCINE, INITIAL     FLU VACCINE, INCREASED ANTIGEN, PRESV FREE        Primary Care Provider Office Phone # Fax #    Giovani Ponce [phone]       [address] [place], [place] [id number]        Equal Access to Services     ROD CONNORS AH: Hadii ankur melo hadasho Soomaali, waaxda luqadaha, qaybta kaalmada adeegyada, milli lafleur. So [facility] [phone].    ATENCIÓN: Si habla español, tiene a blair disposición servicios gratmatthewos de asistencia lingüística. ame al [phone].    We comply with applicable federal civil rights laws and [place] laws. We do not discriminate on the basis of race, color, national origin, age, disability, sex, sexual orientation, or gender identity.            Thank you!     Thank you for choosing [facility]  for  your care. Our goal is always to provide you with excellent care. Hearing back from our patients is one way we can continue to improve our services. Please take a few minutes to complete the written survey that you may receive in the mail after your visit with us. Thank you!             Your Updated Medication List - Protect others around you: Learn how to safely use, store and throw away your medicines at www.disposemymeds.org.          This list is accurate as of 10/15/18  9:37 AM.  Always use your most recent med list.                   Brand Name Dispense Instructions for use Diagnosis    albuterol 108 (90 Base) MCG/ACT inhaler    PROAIR HFA/PROVENTIL HFA/VENTOLIN HFA    1 Inhaler    Inhale 2 puffs into the lungs every 4 hours as needed for shortness of breath / dyspnea or wheezing    Acute bronchitis, unspecified organism       aspirin 325 MG EC tablet      Take 1 tablet daily        metoprolol tartrate 50 MG tablet    LOPRESSOR     Take 50 mg by mouth 2 times daily        omeprazole 20 MG CR capsule    priLOSEC    180 capsule    Take 1 capsule (20 mg) by mouth 2 times daily    Gastroesophageal reflux disease without esophagitis       simvastatin 40 MG tablet    ZOCOR    90 tablet    Take 1 tablet (40 mg) by mouth At Bedtime    Hyperlipidemia, unspecified hyperlipidemia type

## 2018-11-24 ENCOUNTER — COMMUNICATION - HEALTHEAST (OUTPATIENT)
Dept: CARDIOLOGY | Facility: CLINIC | Age: 73
End: 2018-11-24

## 2018-11-24 DIAGNOSIS — I48.19 PERSISTENT ATRIAL FIBRILLATION (H): ICD-10-CM

## 2018-11-27 ENCOUNTER — COMMUNICATION - HEALTHEAST (OUTPATIENT)
Dept: CARDIOLOGY | Facility: CLINIC | Age: 73
End: 2018-11-27

## 2018-11-27 DIAGNOSIS — I48.19 PERSISTENT ATRIAL FIBRILLATION (H): ICD-10-CM

## 2019-03-07 ENCOUNTER — OFFICE VISIT (OUTPATIENT)
Dept: FAMILY MEDICINE | Facility: CLINIC | Age: 74
End: 2019-03-07
Payer: COMMERCIAL

## 2019-03-07 ENCOUNTER — RECORDS - HEALTHEAST (OUTPATIENT)
Dept: ADMINISTRATIVE | Facility: OTHER | Age: 74
End: 2019-03-07

## 2019-03-07 VITALS
RESPIRATION RATE: 16 BRPM | OXYGEN SATURATION: 98 % | TEMPERATURE: 97.8 F | HEART RATE: 62 BPM | WEIGHT: 234.2 LBS | DIASTOLIC BLOOD PRESSURE: 81 MMHG | SYSTOLIC BLOOD PRESSURE: 149 MMHG | BODY MASS INDEX: 32.21 KG/M2

## 2019-03-07 DIAGNOSIS — R10.84 ABDOMINAL PAIN, GENERALIZED: Primary | ICD-10-CM

## 2019-03-07 LAB
% GRANULOCYTES: 75.8 %G (ref 40–75)
ALBUMIN SERPL-MCNC: 4.5 MG/DL (ref 3.3–4.9)
ALP SERPL-CCNC: 64.8 U/L (ref 40–150)
ALT SERPL-CCNC: 22.6 U/L (ref 0–45)
AST SERPL-CCNC: 23.2 U/L (ref 0–55)
BILIRUB SERPL-MCNC: 0.7 MG/DL (ref 0.2–1.3)
BILIRUBIN UR: NEGATIVE
BLOOD UR: NEGATIVE
BUN SERPL-MCNC: 15.9 MG/DL (ref 7–21)
CALCIUM SERPL-MCNC: 9.5 MG/DL (ref 8.5–10.1)
CHLORIDE SERPLBLD-SCNC: 105.4 MMOL/L (ref 98–110)
CO2 SERPL-SCNC: 24.1 MMOL/L (ref 20–32)
CREAT SERPL-MCNC: 1 MG/DL (ref 0.7–1.3)
GFR SERPL CREATININE-BSD FRML MDRD: 77.4 ML/MIN/1.7 M2
GLUCOSE SERPL-MCNC: 98.7 MG'DL (ref 70–99)
GLUCOSE URINE: NEGATIVE
GRANULOCYTES #: 5.7 K/UL (ref 1.6–8.3)
HCT VFR BLD AUTO: 46.4 % (ref 40–53)
HEMOGLOBIN: 15 G/DL (ref 13.3–17.7)
KETONES UR QL: NEGATIVE
LEUKOCYTE ESTERASE UR: NEGATIVE
LIPASE SERPL-CCNC: 9 U/L (ref 0–52)
LYMPHOCYTES # BLD AUTO: 1.3 K/UL (ref 0.8–5.3)
LYMPHOCYTES NFR BLD AUTO: 17.5 %L (ref 20–48)
MCH RBC QN AUTO: 31.8 PG (ref 26.5–35)
MCHC RBC AUTO-ENTMCNC: 32.3 G/DL (ref 32–36)
MCV RBC AUTO: 98.5 FL (ref 78–100)
MID #: 0.5 K/UL (ref 0–2.2)
MID %: 6.7 %M (ref 0–20)
NITRITE UR QL STRIP: NEGATIVE
PH UR STRIP: 5 [PH] (ref 5–7)
PLATELET # BLD AUTO: 185 K/UL (ref 150–450)
POTASSIUM SERPL-SCNC: 4.6 MMOL/DL (ref 3.2–4.6)
PROT SERPL-MCNC: 6.9 G/DL (ref 6.8–8.8)
PROTEIN UR: NEGATIVE
RBC # BLD AUTO: 4.7 M/UL (ref 4.4–5.9)
SODIUM SERPL-SCNC: 137.3 MMOL/L (ref 132–142)
SP GR UR STRIP: 1.02
UROBILINOGEN UR STRIP-ACNC: NORMAL
WBC # BLD AUTO: 7.5 K/UL (ref 4–11)

## 2019-03-07 NOTE — PATIENT INSTRUCTIONS
2019    [facility] Radiology  [phone]  Fax Orders to [phone]    [address] [address]    Appointment:  2019  Arrival Time:  1:40pm    Per  Yaneth nothing to eat or drink 2 hours prior to appointment.  Patient given instructions no further questions at this time. Edith JARRETT    Please bring a copy of your insurance card and photo ID    If you cannot make this appointment please call [phone] to reschedule

## 2019-03-07 NOTE — PROGRESS NOTES
Patient Active Problem List    Diagnosis Date Noted     History of colonic polyps 02/10/2017     Priority: Medium     [facility] showing   Cecum:  One 2mm sessile polyp, removed  Transverse colon:  Three 3-7mm sessile polyps, removed  Sigmoid colon:  One 2mm sessile polyp, removed  Rectum:  One 4mm polyp, removed  Diverticulosis, internal hemorrhoids.         BPH (benign prostatic hyperplasia) 04/14/2015     Priority: Medium     Neg prostate bx 2009.         H/O cardiovascular stress test 04/14/2015     Priority: Medium     Negative in 2012       Impaired fasting glucose 01/28/2014     Priority: Medium     Atrial fibrillation (H) 03/20/2013     Priority: Medium     DR Vee @ [facility].    Failed sotalol conversion  [id number] 2 as of 2018.  Declined anticoagulation.  Taking ASA       Basal cell carcinoma of skin 03/20/2013     Priority: Medium     Basal Cell Carcinoma Of The Skin 2009 (173.9); left cheek. excised by Dr Hernandez @ [facility]         Esophageal reflux 03/20/2013     Priority: Medium     Esophageal Reflux (530.81); benign endoscopy on EGD 6/22/04         Hyperlipidemia 03/20/2013     Priority: Medium     Tier 1  Diagnosis updated by automated process. Provider to review and confirm.       Health Care Home 03/20/2013     Priority: Medium     Tier 1  DX V65.8 REPLACED WITH 55412 HEALTH CARE HOME (04/08/2013)       Obstructive sleep apnea 03/20/2013     Priority: Medium     Obstructive Sleep Apnea (327.23); intolerant of cpap         Spermatocele 03/20/2013     Priority: Medium     Spermatocele (608.1); left testicle confirmed by ultrasound.          Abnormal abdominal CT scan 04/14/2015     Priority: Low     Inguinal hernias noted--planning repair fall 2015  Diverticulosis  Aortic and coronary atherosclerosis  bph       There are no exam notes on file for this visit.  Chief Complaint   Patient presents with     Abdominal Pain     stomach for the past 3 weeks. Pt feels full all  the time     Blood pressure 149/81, pulse 62, temperature 97.8  F (36.6  C), temperature source Oral, resp. rate 16, weight 106.2 kg (234 lb 3.2 oz), SpO2 98 %.  Pain in epigrastrium--concerned that may be   Early satiety.  Achy up on top.  Played 2 games and didn't affect swing  No wt loss.  No vomiting.  occas diarrhea controlled w/ imodium.  No constipation.  No bloody urine or dysuria  Nocturia once.  EGD 2004 negative    chiro worked on back--better    SUBJECTIVE:  Mehrdad Schneider is here for abdominal pain.  He has vague epigastric discomfort with early satiety.  He also has, on top of that, an achy feeling on the top of the stomach as well.  There has been no weight loss or vomiting.  He has occasional diarrhea that is controlled with Imodium.  No constipation.  No dysuria, hematuria, or jaundice.  He has nocturia x1.  He has history of reflux, and an EGD was negative.  He does take aspirin and is on a PPI chronically.  He has a history of diverticulosis that was seen on a CT in the past.  He has had prior hernia surgery.   He is retired.  He is very active physically and plays softball.  The twisting and moving in softball hasn't affected his pain.  He has tried working with a chiropractor to help what he felt was musculoskeletal pain.  This seemed to help some, but his abdominal pain is persistent and has been there for 2-3 weeks.   OBJECTIVE:     GENERAL:  Patient is alert, pleasant, and in no acute distress.   VITAL SIGNS:  Blood pressure is high normal.  Pulse is regular at 62.   ABDOMEN:  Soft, with normoactive bowel sounds.  He does have a small diastasis recti.  He has diffuse tenderness to palpation, but no guarding or rebound. No hepatosplenomegaly.   LABS:  CBC, UA, UC, and CMP are all essentially normal.   ASSESSMENT/PLAN:   1.  Abdominal pain of unclear etiology, with early satiety.  We want to make sure that this isn't a pancreatic or hepatic lesion.  We'll get a CT, which is scheduled for 03/08.  If  there are severe findings  I would refer to the appropriate surgeon.  If there are no findings I'll set him up for an EGD, as early satiety could be a presentation for gastritis or a finding which isn't well seen on CT.  We'll continue the PPI and aspirin for now.  We'll call him with results 03/08.

## 2019-03-08 ENCOUNTER — HOSPITAL ENCOUNTER (OUTPATIENT)
Dept: CT IMAGING | Facility: CLINIC | Age: 74
Discharge: HOME | End: 2019-03-08
Attending: FAMILY MEDICINE

## 2019-03-08 DIAGNOSIS — R10.84 ABDOMINAL PAIN, GENERALIZED: ICD-10-CM

## 2019-03-08 LAB
CREAT BLD-MCNC: 1.1 MG/DL
POC GFR AMER AF HE - HISTORICAL: >60 ML/MIN/1.73M2
POC GFR NON AMER AF HE - HISTORICAL: >60 ML/MIN/1.73M2

## 2019-03-08 NOTE — RESULT ENCOUNTER NOTE
I called:    His blood and urine tests were all very normal--good news.  We will contact him after his CT test today

## 2019-03-12 DIAGNOSIS — R06.09 DYSPNEA ON EXERTION: ICD-10-CM

## 2019-03-12 DIAGNOSIS — R10.13 ABDOMINAL PAIN, EPIGASTRIC: Primary | ICD-10-CM

## 2019-03-12 NOTE — PROGRESS NOTES
[facility]  [address] [place], [place] [address]  Phone: [phone]  Fax: [phone]    March 12, 2019 at 2:20 pm Referral, demographics, office visit and medication list faxed to [phone]. Ana Caro CMA

## 2019-03-12 NOTE — PROGRESS NOTES
Spoke with pt.  Still has epigastric discomfort.  Has chest pain that is not necessarily exertional.  Has dyspnea with extreme exertion that is not new.    CT shows no clear abd pathology and extensive atherosclerosis of coronary arteries.    Plan:  1.  Will get stress echo  2.  Will arrange for EGD.    Duglas

## 2019-03-12 NOTE — PROGRESS NOTES
GASTROENTEROLOGY ADULT REF PROCEDURE ONLY Other; [place] [facility] [phone]  March 12, 2019 at 1:47 pm Online referral placed with [facility] who will contact patient to schedule.   [place] [facility]  Phone [phone]  Fax: [phone]  [facility] [address]

## 2019-03-13 ENCOUNTER — RECORDS - HEALTHEAST (OUTPATIENT)
Dept: ADMINISTRATIVE | Facility: OTHER | Age: 74
End: 2019-03-13

## 2019-03-18 NOTE — RESULT ENCOUNTER NOTE
I called him w/ results.  We are ordering an EGD to assess his epigastric pain and early satiety, and a stress test in light of his non-specific sx and coronary calcification.  PAOLA Ponce

## 2019-04-02 ENCOUNTER — TRANSFERRED RECORDS (OUTPATIENT)
Dept: HEALTH INFORMATION MANAGEMENT | Facility: CLINIC | Age: 74
End: 2019-04-02

## 2019-04-03 ENCOUNTER — TELEPHONE (OUTPATIENT)
Dept: FAMILY MEDICINE | Facility: CLINIC | Age: 74
End: 2019-04-03

## 2019-04-03 DIAGNOSIS — J01.10 ACUTE NON-RECURRENT FRONTAL SINUSITIS: Primary | ICD-10-CM

## 2019-04-03 NOTE — TELEPHONE ENCOUNTER
[facility] Family Medicine phone call message- general phone call:    Reason for call: He needs a call back re a terrible sinus headache he needs an antibiotic prescribed.Please give a call back asap    Return call needed: Yes    OK to leave a message on voice mail? Yes    Primary language: English      needed? No    Call taken on April 3, 2019 at 9:37 AM by Alessia Hearn

## 2019-04-03 NOTE — TELEPHONE ENCOUNTER
"Spoke with patient who stated that he has had a \"horrible HA\" for the past 2-3 days where he is not able to sleep.  He states that his nose is congested but is able to blow out clear drainage plus at times \"it just runs\". He denies fever or cough/chest congestion.  He has tried ASA for his HA but is not wanting to take anything like sudafed d/t his hx of a-fib.    Did triage for stroke like symptoms d/t c/o HA but denies all symptoms of.   Patient states he has tried hot and cold compresses, nasal spray but with no relief.  Would like to know if there is something else he should be trying.    Routed to Dr. Ponce/MARTY Frazier RN       "

## 2019-04-04 RX ORDER — AMOXICILLIN 500 MG/1
1000 CAPSULE ORAL 2 TIMES DAILY
Qty: 28 CAPSULE | Refills: 0 | Status: SHIPPED | OUTPATIENT
Start: 2019-04-04 | End: 2019-05-13

## 2019-04-04 NOTE — TELEPHONE ENCOUNTER
Called and spoke with patient.  He continues with sinus congestion and HA as noted previously  He is wondering if there is something that Dr. Ponce can prescribe.    Routed to Dr. Ponce/MARTY Frazier RN

## 2019-04-04 NOTE — TELEPHONE ENCOUNTER
He is calling back re this message he is really feeling horrible and he needs this something prescribed.

## 2019-04-04 NOTE — TELEPHONE ENCOUNTER
Cold/sinus HA.  Since mon.  Clear.  Dripping.  HA--cold packs frontal.  A:  Possible sinusitis  Plan  Amox 1 g bid for 7 da  Afrin and claritin otc.

## 2019-04-08 ENCOUNTER — HOSPITAL ENCOUNTER (OUTPATIENT)
Dept: CARDIOLOGY | Facility: CLINIC | Age: 74
Discharge: HOME | End: 2019-04-08
Attending: FAMILY MEDICINE

## 2019-04-08 ENCOUNTER — TRANSFERRED RECORDS (OUTPATIENT)
Dept: HEALTH INFORMATION MANAGEMENT | Facility: CLINIC | Age: 74
End: 2019-04-08

## 2019-04-08 DIAGNOSIS — R06.09 DOE (DYSPNEA ON EXERTION): ICD-10-CM

## 2019-04-08 DIAGNOSIS — R06.09 OTHER FORMS OF DYSPNEA: ICD-10-CM

## 2019-04-08 DIAGNOSIS — R94.39 ABNORMAL STRESS TEST: Primary | ICD-10-CM

## 2019-04-08 DIAGNOSIS — R94.39 ABNORMAL CARDIOVASCULAR STRESS TEST: ICD-10-CM

## 2019-04-08 LAB
CV STRESS CURRENT BP HE: NORMAL
CV STRESS CURRENT HR HE: 107
CV STRESS CURRENT HR HE: 109
CV STRESS CURRENT HR HE: 112
CV STRESS CURRENT HR HE: 122
CV STRESS CURRENT HR HE: 125
CV STRESS CURRENT HR HE: 126
CV STRESS CURRENT HR HE: 126
CV STRESS CURRENT HR HE: 128
CV STRESS CURRENT HR HE: 135
CV STRESS CURRENT HR HE: 137
CV STRESS CURRENT HR HE: 71
CV STRESS CURRENT HR HE: 73
CV STRESS CURRENT HR HE: 73
CV STRESS CURRENT HR HE: 74
CV STRESS CURRENT HR HE: 77
CV STRESS CURRENT HR HE: 78
CV STRESS CURRENT HR HE: 79
CV STRESS CURRENT HR HE: 80
CV STRESS CURRENT HR HE: 81
CV STRESS CURRENT HR HE: 82
CV STRESS CURRENT HR HE: 82
CV STRESS DEVIATION TIME HE: NORMAL
CV STRESS ECHO PERCENT HR HE: NORMAL
CV STRESS EXERCISE STAGE HE: NORMAL
CV STRESS FINAL RESTING BP HE: NORMAL
CV STRESS FINAL RESTING HR HE: 73
CV STRESS MAX HR HE: 149
CV STRESS MAX TREADMILL GRADE HE: 12
CV STRESS MAX TREADMILL SPEED HE: 2.5
CV STRESS PEAK DIA BP HE: NORMAL
CV STRESS PEAK SYS BP HE: NORMAL
CV STRESS PHASE HE: NORMAL
CV STRESS PROTOCOL HE: NORMAL
CV STRESS RESTING PT POSITION HE: NORMAL
CV STRESS ST DEVIATION AMOUNT HE: NORMAL
CV STRESS ST DEVIATION ELEVATION HE: NORMAL
CV STRESS ST EVELATION AMOUNT HE: NORMAL
CV STRESS TEST TYPE HE: NORMAL
CV STRESS TOTAL STAGE TIME MIN 1 HE: NORMAL
ECHO EJECTION FRACTION ESTIMATED: 60 %
STRESS ECHO BASELINE BP: NORMAL
STRESS ECHO BASELINE HR: 69
STRESS ECHO CALCULATED PERCENT HR: 103 %
STRESS ECHO LAST STRESS BP: NORMAL
STRESS ECHO LAST STRESS HR: 135
STRESS ECHO POST ESTIMATED WORKLOAD: 6.4
STRESS ECHO POST EXERCISE DUR MIN: 4
STRESS ECHO POST EXERCISE DUR SEC: 30
STRESS ECHO TARGET HR: 123

## 2019-04-10 ENCOUNTER — TRANSFERRED RECORDS (OUTPATIENT)
Dept: HEALTH INFORMATION MANAGEMENT | Facility: CLINIC | Age: 74
End: 2019-04-10

## 2019-04-10 ENCOUNTER — OFFICE VISIT - HEALTHEAST (OUTPATIENT)
Dept: CARDIOLOGY | Facility: CLINIC | Age: 74
End: 2019-04-10

## 2019-04-10 ENCOUNTER — RECORDS - HEALTHEAST (OUTPATIENT)
Dept: ADMINISTRATIVE | Facility: OTHER | Age: 74
End: 2019-04-10

## 2019-04-10 DIAGNOSIS — E78.2 MIXED HYPERLIPIDEMIA: ICD-10-CM

## 2019-04-10 DIAGNOSIS — I48.19 PERSISTENT ATRIAL FIBRILLATION (H): ICD-10-CM

## 2019-04-10 DIAGNOSIS — R94.39 ABNORMAL CARDIOVASCULAR STRESS TEST: ICD-10-CM

## 2019-04-10 DIAGNOSIS — R06.09 DOE (DYSPNEA ON EXERTION): ICD-10-CM

## 2019-04-10 DIAGNOSIS — I10 ESSENTIAL HYPERTENSION: ICD-10-CM

## 2019-04-10 DIAGNOSIS — G47.30 SLEEP APNEA, UNSPECIFIED TYPE: ICD-10-CM

## 2019-04-10 ASSESSMENT — MIFFLIN-ST. JEOR: SCORE: 1813.65

## 2019-04-16 ENCOUNTER — COMMUNICATION - HEALTHEAST (OUTPATIENT)
Dept: CARDIOLOGY | Facility: CLINIC | Age: 74
End: 2019-04-16

## 2019-04-18 ENCOUNTER — HOSPITAL ENCOUNTER (OUTPATIENT)
Dept: CT IMAGING | Facility: CLINIC | Age: 74
Discharge: HOME | End: 2019-04-18
Attending: INTERNAL MEDICINE

## 2019-04-18 DIAGNOSIS — R94.39 ABNORMAL CARDIOVASCULAR STRESS TEST: ICD-10-CM

## 2019-04-18 LAB
BSA FOR ECHO PROCEDURE: 2.31 M2
CREAT BLD-MCNC: 1.2 MG/DL (ref 0.7–1.3)
CV CALCIUM SCORE AGATSTON LM: 148
CV CALCIUM SCORING AGATSON LAD: 2396
CV CALCIUM SCORING AGATSTON CX: 247
CV CALCIUM SCORING AGATSTON RCA: 923
CV CALCIUM SCORING AGATSTON TOTAL: 3714
GFR SERPL CREATININE-BSD FRML MDRD: 59 ML/MIN/1.73M2
LEFT VENTRICLE HEART RATE: 64 BPM

## 2019-04-18 ASSESSMENT — MIFFLIN-ST. JEOR: SCORE: 1810.35

## 2019-04-19 ENCOUNTER — AMBULATORY - HEALTHEAST (OUTPATIENT)
Dept: CARDIOLOGY | Facility: CLINIC | Age: 74
End: 2019-04-19

## 2019-04-19 ENCOUNTER — COMMUNICATION - HEALTHEAST (OUTPATIENT)
Dept: CARDIOLOGY | Facility: CLINIC | Age: 74
End: 2019-04-19

## 2019-04-19 ENCOUNTER — SURGERY - HEALTHEAST (OUTPATIENT)
Dept: CARDIOLOGY | Facility: CLINIC | Age: 74
End: 2019-04-19

## 2019-04-19 DIAGNOSIS — R93.89 ABNORMAL COMPUTED TOMOGRAPHY ANGIOGRAPHY (CTA): ICD-10-CM

## 2019-04-19 DIAGNOSIS — E78.2 MIXED HYPERLIPIDEMIA: ICD-10-CM

## 2019-04-19 DIAGNOSIS — R93.1 ELEVATED CORONARY ARTERY CALCIUM SCORE: ICD-10-CM

## 2019-04-22 ENCOUNTER — TRANSFERRED RECORDS (OUTPATIENT)
Dept: HEALTH INFORMATION MANAGEMENT | Facility: CLINIC | Age: 74
End: 2019-04-22

## 2019-04-22 ENCOUNTER — HOSPITAL ENCOUNTER (OUTPATIENT)
Dept: CARDIOLOGY | Facility: CLINIC | Age: 74
Discharge: HOME | End: 2019-04-22
Attending: INTERNAL MEDICINE | Admitting: INTERNAL MEDICINE
Payer: MEDICARE

## 2019-04-22 ENCOUNTER — SURGERY - HEALTHEAST (OUTPATIENT)
Dept: CARDIOLOGY | Facility: CLINIC | Age: 74
End: 2019-04-22

## 2019-04-22 DIAGNOSIS — I25.10 CORONARY ARTERY DISEASE INVOLVING NATIVE CORONARY ARTERY OF NATIVE HEART WITHOUT ANGINA PECTORIS: ICD-10-CM

## 2019-04-22 DIAGNOSIS — R93.89 ABNORMAL COMPUTED TOMOGRAPHY ANGIOGRAPHY (CTA): ICD-10-CM

## 2019-04-22 DIAGNOSIS — E78.2 MIXED HYPERLIPIDEMIA: ICD-10-CM

## 2019-04-22 DIAGNOSIS — R93.1 ELEVATED CORONARY ARTERY CALCIUM SCORE: ICD-10-CM

## 2019-04-22 LAB
ANION GAP SERPL CALCULATED.3IONS-SCNC: 10 MMOL/L (ref 5–18)
ATRIAL RATE - MUSE: 70 BPM
BASOPHILS # BLD AUTO: 0 THOU/UL (ref 0–0.2)
BASOPHILS NFR BLD AUTO: 0 % (ref 0–2)
BUN SERPL-MCNC: 17 MG/DL (ref 8–28)
CALCIUM SERPL-MCNC: 9.3 MG/DL (ref 8.5–10.5)
CHLORIDE BLD-SCNC: 109 MMOL/L (ref 98–107)
CHOLEST SERPL-MCNC: 135 MG/DL
CO2 SERPL-SCNC: 22 MMOL/L (ref 22–31)
CREAT SERPL-MCNC: 0.96 MG/DL (ref 0.7–1.3)
DIASTOLIC BLOOD PRESSURE - MUSE: NORMAL MMHG
EOSINOPHIL # BLD AUTO: 0.1 THOU/UL (ref 0–0.4)
EOSINOPHIL NFR BLD AUTO: 1 % (ref 0–6)
ERYTHROCYTE [DISTWIDTH] IN BLOOD BY AUTOMATED COUNT: 13.5 % (ref 11–14.5)
FASTING STATUS PATIENT QL REPORTED: YES
GFR SERPL CREATININE-BSD FRML MDRD: >60 ML/MIN/1.73M2
GLUCOSE BLD-MCNC: 111 MG/DL (ref 70–125)
HCT VFR BLD AUTO: 45.5 % (ref 40–54)
HDLC SERPL-MCNC: 42 MG/DL
HGB BLD-MCNC: 14.8 G/DL (ref 14–18)
INTERPRETATION ECG - MUSE: NORMAL
LDLC SERPL CALC-MCNC: 78 MG/DL
LYMPHOCYTES # BLD AUTO: 1.1 THOU/UL (ref 0.8–4.4)
LYMPHOCYTES NFR BLD AUTO: 13 % (ref 20–40)
MCH RBC QN AUTO: 30 PG (ref 27–34)
MCHC RBC AUTO-ENTMCNC: 32.5 G/DL (ref 32–36)
MCV RBC AUTO: 92 FL (ref 80–100)
MONOCYTES # BLD AUTO: 0.5 THOU/UL (ref 0–0.9)
MONOCYTES NFR BLD AUTO: 7 % (ref 2–10)
NEUTROPHILS # BLD AUTO: 6.4 THOU/UL (ref 2–7.7)
NEUTROPHILS NFR BLD AUTO: 79 % (ref 50–70)
P AXIS - MUSE: NORMAL DEGREES
PLATELET # BLD AUTO: 211 THOU/UL (ref 140–440)
PMV BLD AUTO: 10.5 FL (ref 8.5–12.5)
POTASSIUM BLD-SCNC: 4.4 MMOL/L (ref 3.5–5)
PR INTERVAL - MUSE: NORMAL MS
QRS DURATION - MUSE: 90 MS
QT - MUSE: 434 MS
QTC - MUSE: 418 MS
R AXIS - MUSE: 18 DEGREES
RBC # BLD AUTO: 4.94 MILL/UL (ref 4.4–6.2)
SODIUM SERPL-SCNC: 141 MMOL/L (ref 136–145)
SYSTOLIC BLOOD PRESSURE - MUSE: NORMAL MMHG
T AXIS - MUSE: 14 DEGREES
TRIGL SERPL-MCNC: 74 MG/DL
VENTRICULAR RATE- MUSE: 56 BPM
WBC: 8.1 THOU/UL (ref 4–11)

## 2019-04-22 ASSESSMENT — MIFFLIN-ST. JEOR
SCORE: 1817.15
SCORE: 1817.15

## 2019-04-23 ENCOUNTER — OFFICE VISIT - HEALTHEAST (OUTPATIENT)
Dept: CARDIOLOGY | Facility: CLINIC | Age: 74
End: 2019-04-23

## 2019-04-23 ENCOUNTER — TRANSFERRED RECORDS (OUTPATIENT)
Dept: HEALTH INFORMATION MANAGEMENT | Facility: CLINIC | Age: 74
End: 2019-04-23

## 2019-04-23 DIAGNOSIS — I25.798 CORONARY ARTERY DISEASE OF OTHER BYPASS GRAFT WITH STABLE ANGINA PECTORIS (H): ICD-10-CM

## 2019-04-23 ASSESSMENT — MIFFLIN-ST. JEOR: SCORE: 1833.81

## 2019-04-30 ENCOUNTER — HOSPITAL ENCOUNTER (OUTPATIENT)
Dept: SURGERY | Facility: CLINIC | Age: 74
Discharge: HOME | End: 2019-04-30
Admitting: REGISTERED NURSE

## 2019-04-30 ENCOUNTER — SURGERY - HEALTHEAST (OUTPATIENT)
Dept: CARDIOLOGY | Facility: CLINIC | Age: 74
End: 2019-04-30

## 2019-04-30 ENCOUNTER — HOSPITAL ENCOUNTER (OUTPATIENT)
Dept: CARDIOLOGY | Facility: CLINIC | Age: 74
Discharge: HOME | End: 2019-04-30

## 2019-04-30 ENCOUNTER — HOSPITAL ENCOUNTER (INPATIENT)
Dept: CARDIOLOGY | Facility: CLINIC | Age: 74
Discharge: HOME | End: 2019-05-06
Attending: THORACIC SURGERY (CARDIOTHORACIC VASCULAR SURGERY) | Admitting: THORACIC SURGERY (CARDIOTHORACIC VASCULAR SURGERY)
Payer: MEDICARE

## 2019-04-30 ENCOUNTER — HOSPITAL ENCOUNTER (OUTPATIENT)
Dept: RADIOLOGY | Facility: CLINIC | Age: 74
Discharge: HOME | End: 2019-04-30

## 2019-04-30 ENCOUNTER — ANESTHESIA - HEALTHEAST (OUTPATIENT)
Dept: SURGERY | Facility: CLINIC | Age: 74
End: 2019-04-30

## 2019-04-30 DIAGNOSIS — R94.39 ABNORMAL CARDIOVASCULAR STRESS TEST: ICD-10-CM

## 2019-04-30 DIAGNOSIS — R93.1 ELEVATED CORONARY ARTERY CALCIUM SCORE: ICD-10-CM

## 2019-04-30 DIAGNOSIS — Z95.1 S/P CABG (CORONARY ARTERY BYPASS GRAFT): ICD-10-CM

## 2019-04-30 LAB
ABO/RH(D): NORMAL
ALBUMIN UR-MCNC: NEGATIVE MG/DL
ANION GAP SERPL CALCULATED.3IONS-SCNC: 9 MMOL/L (ref 5–18)
ANTIBODY SCREEN: NEGATIVE
AORTIC ROOT: 3.5 CM
AORTIC VALVE MEAN VELOCITY: 78.8 CM/S
APPEARANCE UR: CLEAR
ASCENDING AORTA: 3.5 CM
ATRIAL RATE - MUSE: 535 BPM
AV DIMENSIONLESS INDEX VTI: 0.7
AV MEAN GRADIENT: 3 MMHG
AV PEAK GRADIENT: 5.4 MMHG
AV VALVE AREA: 2.6 CM2
AV VELOCITY RATIO: 0.6
BILIRUB UR QL STRIP: NEGATIVE
BSA FOR ECHO PROCEDURE: 2.31 M2
BUN SERPL-MCNC: 17 MG/DL (ref 8–28)
CALCIUM SERPL-MCNC: 10 MG/DL (ref 8.5–10.5)
CHLORIDE BLD-SCNC: 106 MMOL/L (ref 98–107)
CO2 SERPL-SCNC: 22 MMOL/L (ref 22–31)
COLOR UR AUTO: YELLOW
CREAT SERPL-MCNC: 0.97 MG/DL (ref 0.7–1.3)
CV BLOOD PRESSURE: NORMAL MMHG
CV ECHO HEIGHT: 72 IN
CV ECHO WEIGHT: 232 LBS
DIASTOLIC BLOOD PRESSURE - MUSE: NORMAL MMHG
DOP CALC AO PEAK VEL: 116 CM/S
DOP CALC AO VTI: 24.1 CM
DOP CALC LVOT AREA: 3.8 CM2
DOP CALC LVOT DIAMETER: 2.2 CM
DOP CALC LVOT PEAK VEL: 69.9 CM/S
DOP CALC LVOT STROKE VOLUME: 63.8 CM3
DOP CALCLVOT PEAK VEL VTI: 16.8 CM
ECHO EJECTION FRACTION ESTIMATED: 55 %
EJECTION FRACTION: 54 % (ref 55–75)
ERYTHROCYTE [DISTWIDTH] IN BLOOD BY AUTOMATED COUNT: 13.2 % (ref 11–14.5)
FRACTIONAL SHORTENING: 37.7 % (ref 28–44)
GFR SERPL CREATININE-BSD FRML MDRD: >60 ML/MIN/1.73M2
GLUCOSE BLD-MCNC: 103 MG/DL (ref 70–125)
GLUCOSE UR STRIP-MCNC: NEGATIVE MG/DL
HBA1C MFR BLD: 6.3 % (ref 4.2–6.1)
HCT VFR BLD AUTO: 46.4 % (ref 40–54)
HGB BLD-MCNC: 15.3 G/DL (ref 14–18)
HGB UR QL STRIP: NEGATIVE
INR PPP: 1.04 (ref 0.9–1.1)
INTERPRETATION ECG - MUSE: NORMAL
INTERVENTRICULAR SEPTUM IN END DIASTOLE: 1 CM (ref 0.6–1)
IVS/PW RATIO: 1
KETONES UR STRIP-MCNC: NEGATIVE MG/DL
LA AREA 1: 25.8 CM2
LA AREA 2: 30.6 CM2
LEFT ATRIUM LENGTH: 6.17 CM
LEFT ATRIUM SIZE: 4.8 CM
LEFT ATRIUM TO AORTIC ROOT RATIO: 1.37 NO UNITS
LEFT ATRIUM VOLUME INDEX: 47.1 ML/M2
LEFT ATRIUM VOLUME: 108.8 ML
LEFT VENTRICLE CARDIAC INDEX: 2.1 L/MIN/M2
LEFT VENTRICLE CARDIAC OUTPUT: 4.9 L/MIN
LEFT VENTRICLE DIASTOLIC VOLUME INDEX: 49.4 CM3/M2 (ref 34–74)
LEFT VENTRICLE DIASTOLIC VOLUME: 114 CM3 (ref 62–150)
LEFT VENTRICLE HEART RATE: 76 BPM
LEFT VENTRICLE MASS INDEX: 86.8 G/M2
LEFT VENTRICLE SYSTOLIC VOLUME INDEX: 22.9 CM3/M2 (ref 11–31)
LEFT VENTRICLE SYSTOLIC VOLUME: 53 CM3 (ref 21–61)
LEFT VENTRICULAR INTERNAL DIMENSION IN DIASTOLE: 5.3 CM (ref 4.2–5.8)
LEFT VENTRICULAR INTERNAL DIMENSION IN SYSTOLE: 3.3 CM (ref 2.5–4)
LEFT VENTRICULAR MASS: 200.4 G
LEFT VENTRICULAR OUTFLOW TRACT MEAN GRADIENT: 1 MMHG
LEFT VENTRICULAR OUTFLOW TRACT MEAN VELOCITY: 47.5 CM/S
LEFT VENTRICULAR OUTFLOW TRACT PEAK GRADIENT: 2 MMHG
LEFT VENTRICULAR POSTERIOR WALL IN END DIASTOLE: 1 CM (ref 0.6–1)
LEUKOCYTE ESTERASE UR QL STRIP: NEGATIVE
LV STROKE VOLUME INDEX: 27.6 ML/M2
MAGNESIUM SERPL-MCNC: 2.1 MG/DL (ref 1.8–2.6)
MCH RBC QN AUTO: 29.9 PG (ref 27–34)
MCHC RBC AUTO-ENTMCNC: 33 G/DL (ref 32–36)
MCV RBC AUTO: 91 FL (ref 80–100)
MV AVERAGE E/E' RATIO: 8.1 CM/S
MV DECELERATION TIME: 201 MS
MV E'TISSUE VEL-LAT: 12.7 CM/S
MV E'TISSUE VEL-MED: 7.17 CM/S
MV LATERAL E/E' RATIO: 6.3
MV MEDIAL E/E' RATIO: 11.2
MV PEAK E VELOCITY: 80 CM/S
NITRATE UR QL: NEGATIVE
NUC REST DIASTOLIC VOLUME INDEX: 3710.4 LBS
NUC REST SYSTOLIC VOLUME INDEX: 72 IN
P AXIS - MUSE: NORMAL DEGREES
PH UR STRIP: 5 [PH] (ref 4.5–8)
PLATELET # BLD AUTO: 193 THOU/UL (ref 140–440)
PMV BLD AUTO: 10.4 FL (ref 8.5–12.5)
POTASSIUM BLD-SCNC: 4.5 MMOL/L (ref 3.5–5)
PR INTERVAL - MUSE: NORMAL MS
PREALB SERPL-MCNC: 23.4 MG/DL (ref 19–38)
QRS DURATION - MUSE: 88 MS
QT - MUSE: 412 MS
QTC - MUSE: 390 MS
R AXIS - MUSE: 27 DEGREES
RBC # BLD AUTO: 5.11 MILL/UL (ref 4.4–6.2)
SODIUM SERPL-SCNC: 137 MMOL/L (ref 136–145)
SP GR UR STRIP: 1.01 (ref 1–1.03)
SYSTOLIC BLOOD PRESSURE - MUSE: NORMAL MMHG
T AXIS - MUSE: 23 DEGREES
TRICUSPID REGURGITATION PEAK PRESSURE GRADIENT: 29.4 MMHG
TRICUSPID VALVE ANULAR PLANE SYSTOLIC EXCURSION: 1.8 CM
TRICUSPID VALVE PEAK REGURGITANT VELOCITY: 271 CM/S
UROBILINOGEN UR STRIP-ACNC: NORMAL
VENTRICULAR RATE- MUSE: 54 BPM
WBC: 7.4 THOU/UL (ref 4–11)

## 2019-04-30 ASSESSMENT — MIFFLIN-ST. JEOR
SCORE: 1809.89
SCORE: 1818

## 2019-05-01 ENCOUNTER — DOCUMENTATION ONLY (OUTPATIENT)
Dept: OTHER | Facility: CLINIC | Age: 74
End: 2019-05-01

## 2019-05-01 ENCOUNTER — TRANSFERRED RECORDS (OUTPATIENT)
Dept: HEALTH INFORMATION MANAGEMENT | Facility: CLINIC | Age: 74
End: 2019-05-01

## 2019-05-01 ENCOUNTER — SURGERY - HEALTHEAST (OUTPATIENT)
Dept: SURGERY | Facility: CLINIC | Age: 74
End: 2019-05-01

## 2019-05-01 ENCOUNTER — AMBULATORY - HEALTHEAST (OUTPATIENT)
Dept: OTHER | Facility: CLINIC | Age: 74
End: 2019-05-01

## 2019-05-01 LAB
ANION GAP SERPL CALCULATED.3IONS-SCNC: 9 MMOL/L (ref 5–18)
APTT PPP: 37 SECONDS (ref 24–37)
BACTERIA SPEC CULT: NORMAL
BASE EXCESS BLDA CALC-SCNC: -0.9 MMOL/L
BASE EXCESS BLDA CALC-SCNC: -1.4 MMOL/L
BASE EXCESS BLDA CALC-SCNC: -2.3 MMOL/L
BASE EXCESS BLDV CALC-SCNC: 0 MMOL/L
BASE EXCESS BLDV CALC-SCNC: 1 MMOL/L
BASE EXCESS BLDV CALC-SCNC: 1 MMOL/L
BASOPHILS # BLD AUTO: 0 THOU/UL (ref 0–0.2)
BASOPHILS NFR BLD AUTO: 0 % (ref 0–2)
BUN SERPL-MCNC: 18 MG/DL (ref 8–28)
CA-I BLD-MCNC: 1.02 MMOL/L (ref 1.11–1.3)
CA-I BLD-MCNC: 1.05 MMOL/L (ref 1.11–1.3)
CA-I BLD-MCNC: 1.05 MMOL/L (ref 1.11–1.3)
CA-I BLD-MCNC: 1.18 MMOL/L (ref 1.11–1.3)
CA-I BLD-MCNC: 1.45 MMOL/L (ref 1.11–1.3)
CALCIUM SERPL-MCNC: 8.9 MG/DL (ref 8.5–10.5)
CALCIUM, IONIZED MEASURED: 1.15 MMOL/L (ref 1.11–1.3)
CHLORIDE BLD-SCNC: 112 MMOL/L (ref 98–107)
CO2 BLD-SCNC: 27 MMOL/L
CO2 BLD-SCNC: 28 MMOL/L
CO2 BLD-SCNC: 29 MMOL/L
CO2 SERPL-SCNC: 19 MMOL/L (ref 22–31)
COHGB MFR BLD: 99.5 % (ref 95–96)
CREAT SERPL-MCNC: 0.99 MG/DL (ref 0.7–1.3)
EOSINOPHIL # BLD AUTO: 0.1 THOU/UL (ref 0–0.4)
EOSINOPHIL NFR BLD AUTO: 1 % (ref 0–6)
ERYTHROCYTE [DISTWIDTH] IN BLOOD BY AUTOMATED COUNT: 13.3 % (ref 11–14.5)
ERYTHROCYTE [DISTWIDTH] IN BLOOD BY AUTOMATED COUNT: 13.3 % (ref 11–14.5)
FIBRINOGEN PPP-MCNC: 276 MG/DL (ref 170–410)
FLOW: ABNORMAL LPM
GFR SERPL CREATININE-BSD FRML MDRD: >60 ML/MIN/1.73M2
GLUCOSE BLD-MCNC: 123 MG/DL (ref 70–125)
GLUCOSE BLD-MCNC: 137 MG/DL (ref 70–125)
GLUCOSE BLD-MCNC: 140 MG/DL (ref 70–125)
GLUCOSE BLD-MCNC: 141 MG/DL (ref 70–125)
GLUCOSE BLD-MCNC: 145 MG/DL (ref 70–125)
GLUCOSE BLD-MCNC: 155 MG/DL (ref 70–125)
GLUCOSE BLDC GLUCOMTR-MCNC: 105 MG/DL (ref 70–139)
GLUCOSE BLDC GLUCOMTR-MCNC: 117 MG/DL (ref 70–139)
GLUCOSE BLDC GLUCOMTR-MCNC: 125 MG/DL (ref 70–139)
GLUCOSE BLDC GLUCOMTR-MCNC: 132 MG/DL (ref 70–139)
GLUCOSE BLDC GLUCOMTR-MCNC: 137 MG/DL (ref 70–139)
GLUCOSE BLDC GLUCOMTR-MCNC: 141 MG/DL (ref 70–139)
GLUCOSE BLDC GLUCOMTR-MCNC: 142 MG/DL (ref 70–139)
GLUCOSE BLDC GLUCOMTR-MCNC: 149 MG/DL (ref 70–139)
GLUCOSE BLDC GLUCOMTR-MCNC: 152 MG/DL (ref 70–139)
GLUCOSE BLDC GLUCOMTR-MCNC: 160 MG/DL (ref 70–139)
GLUCOSE BLDC GLUCOMTR-MCNC: 164 MG/DL (ref 70–139)
HCO3 BLDA-SCNC: 25.5 MMOL/L (ref 23–29)
HCO3 BLDA-SCNC: 26 MMOL/L (ref 23–29)
HCO3 BLDA-SCNC: 26.1 MMOL/L (ref 23–29)
HCO3 BLDA-SCNC: 26.6 MMOL/L (ref 23–29)
HCO3 BLDV-SCNC: 27 MMOL/L (ref 24–30)
HCO3, ARTERIAL CALC - HISTORICAL: 23.1 MMOL/L (ref 23–29)
HCO3, ARTERIAL CALC - HISTORICAL: 23.9 MMOL/L (ref 23–29)
HCO3, ARTERIAL CALC - HISTORICAL: 24.3 MMOL/L (ref 23–29)
HCT VFR BLD AUTO: 32.8 % (ref 40–54)
HCT VFR BLD AUTO: 32.9 % (ref 40–54)
HCT VFR BLD CALC: 32 % (ref 40–54)
HCT VFR BLD CALC: 32 % (ref 40–54)
HCT VFR BLD CALC: 33 % (ref 40–54)
HCT VFR BLD CALC: 33 % (ref 40–54)
HCT VFR BLD CALC: 40 % (ref 40–54)
HGB BLD-MCNC: 10.8 G/DL (ref 14–18)
HGB BLD-MCNC: 10.9 G/DL (ref 14–18)
HGB BLD-MCNC: 11.2 G/DL (ref 14–18)
HGB BLD-MCNC: 11.2 G/DL (ref 14–18)
HGB BLD-MCNC: 13.6 G/DL (ref 14–18)
INR PPP: 1.37 (ref 0.9–1.1)
INR PPP: 1.51 (ref 0.9–1.1)
ION CA PH 7.4: 1.17 MMOL/L (ref 1.11–1.3)
LYMPHOCYTES # BLD AUTO: 1.1 THOU/UL (ref 0.8–4.4)
LYMPHOCYTES NFR BLD AUTO: 9 % (ref 20–40)
MAGNESIUM SERPL-MCNC: 2.4 MG/DL (ref 1.8–2.6)
MCH RBC QN AUTO: 30.4 PG (ref 27–34)
MCH RBC QN AUTO: 30.5 PG (ref 27–34)
MCHC RBC AUTO-ENTMCNC: 32.8 G/DL (ref 32–36)
MCHC RBC AUTO-ENTMCNC: 33.2 G/DL (ref 32–36)
MCV RBC AUTO: 91 FL (ref 80–100)
MCV RBC AUTO: 93 FL (ref 80–100)
MONOCYTES # BLD AUTO: 0.8 THOU/UL (ref 0–0.9)
MONOCYTES NFR BLD AUTO: 6 % (ref 2–10)
NEUTROPHILS # BLD AUTO: 10.4 THOU/UL (ref 2–7.7)
NEUTROPHILS NFR BLD AUTO: 84 % (ref 50–70)
O2/TOTAL GAS SETTING VFR VENT: 1 %
O2/TOTAL GAS SETTING VFR VENT: 40 %
O2/TOTAL GAS SETTING VFR VENT: ABNORMAL %
OXYHEMOGLOBIN - HISTORICAL: 97.4 % (ref 95–96)
OXYHEMOGLOBIN - HISTORICAL: 97.4 % (ref 95–96)
OXYHEMOGLOBIN - HISTORICAL: 97.9 % (ref 95–96)
PCO2 BLD: 29 MM HG (ref 35–45)
PCO2 BLD: 35 MM HG (ref 35–45)
PCO2 BLD: 37 MM HG (ref 35–45)
PCO2 BLDA: 42.3 MMHG (ref 35–45)
PCO2 BLDA: 42.7 MMHG (ref 35–45)
PCO2 BLDA: 43.4 MMHG (ref 35–45)
PCO2 BLDA: 51.9 MMHG (ref 35–45)
PCO2 BLDV: 57.1 MMHG (ref 35–50)
PEEP: 5 CM H2O
PEEP: 5 CM H2O
PH BLD: 7.41 [PH] (ref 7.37–7.44)
PH BLD: 7.42 [PH] (ref 7.37–7.44)
PH BLD: 7.46 [PH] (ref 7.37–7.44)
PH BLDA: 7.32 [PH] (ref 7.37–7.44)
PH BLDA: 7.38 [PH] (ref 7.37–7.44)
PH BLDA: 7.39 [PH] (ref 7.37–7.44)
PH BLDA: 7.4 [PH] (ref 7.37–7.44)
PH BLDV: 7.28 [PH] (ref 7.35–7.45)
PH: 7.45 (ref 7.35–7.45)
PLATELET # BLD AUTO: 112 THOU/UL (ref 140–440)
PLATELET # BLD AUTO: 123 THOU/UL (ref 140–440)
PMV BLD AUTO: 10.6 FL (ref 8.5–12.5)
PMV BLD AUTO: 11.2 FL (ref 8.5–12.5)
PO2 BLD: 140 MM HG (ref 75–85)
PO2 BLD: 184 MM HG (ref 75–85)
PO2 BLD: 258 MM HG (ref 75–85)
PO2 BLDA: 391 MMHG (ref 75–85)
PO2 BLDA: 397 MMHG (ref 75–85)
PO2 BLDA: 416 MMHG (ref 75–85)
PO2 BLDA: 423 MMHG (ref 75–85)
PO2 BLDV: 57 MMHG (ref 25–47)
POTASSIUM BLD-SCNC: 4 MMOL/L (ref 3.5–5)
POTASSIUM BLD-SCNC: 4 MMOL/L (ref 3.5–5)
POTASSIUM BLD-SCNC: 4.1 MMOL/L (ref 3.5–5)
POTASSIUM BLD-SCNC: 5.1 MMOL/L (ref 3.5–5)
POTASSIUM BLD-SCNC: 5.3 MMOL/L (ref 3.5–5)
POTASSIUM BLD-SCNC: 5.3 MMOL/L (ref 3.5–5)
PSV (LAB BLOOD GAS): 5 CM H2O
RATE: 16 RR/MIN
RBC # BLD AUTO: 3.54 MILL/UL (ref 4.4–6.2)
RBC # BLD AUTO: 3.59 MILL/UL (ref 4.4–6.2)
SAO2 % BLDV: 85 % (ref 70–75)
SAT POCT - HISTORICAL: 100 % (ref 95–96)
SODIUM BLD-SCNC: 140 MMOL/L (ref 136–145)
SODIUM BLD-SCNC: 141 MMOL/L (ref 136–145)
SODIUM SERPL-SCNC: 140 MMOL/L (ref 136–145)
TEMPERATURE: 37 DEGREES C
VENTILATION MODE: ABNORMAL
VENTILATION MODE: ABNORMAL
VENTILATOR TIDAL VOLUME: 600 ML
WBC: 12.5 THOU/UL (ref 4–11)
WBC: 13.9 THOU/UL (ref 4–11)

## 2019-05-01 ASSESSMENT — MIFFLIN-ST. JEOR
SCORE: 1792.65
SCORE: 1792.65

## 2019-05-02 LAB
ANION GAP SERPL CALCULATED.3IONS-SCNC: 9 MMOL/L (ref 5–18)
ATRIAL RATE - MUSE: 74 BPM
BASOPHILS # BLD AUTO: 0 THOU/UL (ref 0–0.2)
BASOPHILS NFR BLD AUTO: 0 % (ref 0–2)
BUN SERPL-MCNC: 19 MG/DL (ref 8–28)
CALCIUM SERPL-MCNC: 8.9 MG/DL (ref 8.5–10.5)
CALCIUM, IONIZED MEASURED: 1.15 MMOL/L (ref 1.11–1.3)
CHLORIDE BLD-SCNC: 113 MMOL/L (ref 98–107)
CO2 SERPL-SCNC: 19 MMOL/L (ref 22–31)
CREAT SERPL-MCNC: 1.07 MG/DL (ref 0.7–1.3)
DIASTOLIC BLOOD PRESSURE - MUSE: NORMAL MMHG
EOSINOPHIL # BLD AUTO: 0 THOU/UL (ref 0–0.4)
EOSINOPHIL NFR BLD AUTO: 0 % (ref 0–6)
ERYTHROCYTE [DISTWIDTH] IN BLOOD BY AUTOMATED COUNT: 13.7 % (ref 11–14.5)
GFR SERPL CREATININE-BSD FRML MDRD: >60 ML/MIN/1.73M2
GLUCOSE BLD-MCNC: 108 MG/DL (ref 70–125)
GLUCOSE BLDC GLUCOMTR-MCNC: 103 MG/DL (ref 70–139)
GLUCOSE BLDC GLUCOMTR-MCNC: 104 MG/DL (ref 70–139)
GLUCOSE BLDC GLUCOMTR-MCNC: 121 MG/DL (ref 70–139)
GLUCOSE BLDC GLUCOMTR-MCNC: 122 MG/DL (ref 70–139)
GLUCOSE BLDC GLUCOMTR-MCNC: 127 MG/DL (ref 70–139)
GLUCOSE BLDC GLUCOMTR-MCNC: 135 MG/DL (ref 70–139)
GLUCOSE BLDC GLUCOMTR-MCNC: 156 MG/DL (ref 70–139)
GLUCOSE BLDC GLUCOMTR-MCNC: 164 MG/DL (ref 70–139)
GLUCOSE BLDC GLUCOMTR-MCNC: 168 MG/DL (ref 70–139)
GLUCOSE BLDC GLUCOMTR-MCNC: 96 MG/DL (ref 70–139)
GLUCOSE BLDC GLUCOMTR-MCNC: 97 MG/DL (ref 70–139)
GLUCOSE BLDC GLUCOMTR-MCNC: 97 MG/DL (ref 70–139)
HCT VFR BLD AUTO: 33.4 % (ref 40–54)
HGB BLD-MCNC: 10.8 G/DL (ref 14–18)
INR PPP: 1.46 (ref 0.9–1.1)
INTERPRETATION ECG - MUSE: NORMAL
ION CA PH 7.4: 1.14 MMOL/L (ref 1.11–1.3)
LAB AP CHARGES (HE HISTORICAL CONVERSION): NORMAL
LYMPHOCYTES # BLD AUTO: 0.6 THOU/UL (ref 0.8–4.4)
LYMPHOCYTES NFR BLD AUTO: 7 % (ref 20–40)
MAGNESIUM SERPL-MCNC: 2 MG/DL (ref 1.8–2.6)
MCH RBC QN AUTO: 30.4 PG (ref 27–34)
MCHC RBC AUTO-ENTMCNC: 32.3 G/DL (ref 32–36)
MCV RBC AUTO: 94 FL (ref 80–100)
MONOCYTES # BLD AUTO: 1.1 THOU/UL (ref 0–0.9)
MONOCYTES NFR BLD AUTO: 11 % (ref 2–10)
NEUTROPHILS # BLD AUTO: 7.8 THOU/UL (ref 2–7.7)
NEUTROPHILS NFR BLD AUTO: 82 % (ref 50–70)
P AXIS - MUSE: 76 DEGREES
PATH REPORT.COMMENTS IMP SPEC: NORMAL
PATH REPORT.FINAL DX SPEC: NORMAL
PATH REPORT.GROSS SPEC: NORMAL
PATH REPORT.MICROSCOPIC SPEC OTHER STN: NORMAL
PATH REPORT.RELEVANT HX SPEC: NORMAL
PH: 7.38 (ref 7.35–7.45)
PLATELET # BLD AUTO: 117 THOU/UL (ref 140–440)
PMV BLD AUTO: 11 FL (ref 8.5–12.5)
POTASSIUM BLD-SCNC: 4.3 MMOL/L (ref 3.5–5)
PR INTERVAL - MUSE: 200 MS
QRS DURATION - MUSE: 102 MS
QT - MUSE: 408 MS
QTC - MUSE: 452 MS
R AXIS - MUSE: -3 DEGREES
RBC # BLD AUTO: 3.55 MILL/UL (ref 4.4–6.2)
RESULT FLAG (HE HISTORICAL CONVERSION): NORMAL
SODIUM SERPL-SCNC: 141 MMOL/L (ref 136–145)
SYSTOLIC BLOOD PRESSURE - MUSE: NORMAL MMHG
T AXIS - MUSE: 12 DEGREES
VENTRICULAR RATE- MUSE: 74 BPM
WBC: 9.6 THOU/UL (ref 4–11)

## 2019-05-02 ASSESSMENT — MIFFLIN-ST. JEOR
SCORE: 1831.66
SCORE: 1831.66

## 2019-05-03 LAB
GLUCOSE BLDC GLUCOMTR-MCNC: 102 MG/DL (ref 70–139)
GLUCOSE BLDC GLUCOMTR-MCNC: 110 MG/DL (ref 70–139)
GLUCOSE BLDC GLUCOMTR-MCNC: 111 MG/DL (ref 70–139)
GLUCOSE BLDC GLUCOMTR-MCNC: 112 MG/DL (ref 70–139)
MAGNESIUM SERPL-MCNC: 2.1 MG/DL (ref 1.8–2.6)
PLATELET # BLD AUTO: 111 THOU/UL (ref 140–440)
POTASSIUM BLD-SCNC: 4.5 MMOL/L (ref 3.5–5)

## 2019-05-03 ASSESSMENT — MIFFLIN-ST. JEOR
SCORE: 1833.02
SCORE: 1833.02

## 2019-05-04 LAB
ANION GAP SERPL CALCULATED.3IONS-SCNC: 10 MMOL/L (ref 5–18)
BLD PROD TYP BPU: NORMAL
BLD PROD TYP BPU: NORMAL
BLOOD EXPIRATION DATE: NORMAL
BLOOD EXPIRATION DATE: NORMAL
BLOOD TYPE: 6200
BLOOD TYPE: 6200
BUN SERPL-MCNC: 21 MG/DL (ref 8–28)
CALCIUM SERPL-MCNC: 9.3 MG/DL (ref 8.5–10.5)
CHLORIDE BLD-SCNC: 95 MMOL/L (ref 98–107)
CO2 SERPL-SCNC: 25 MMOL/L (ref 22–31)
CODING SYSTEM: NORMAL
CODING SYSTEM: NORMAL
COMPONENT (HISTORICAL CONVERSION): NORMAL
COMPONENT (HISTORICAL CONVERSION): NORMAL
CREAT SERPL-MCNC: 0.99 MG/DL (ref 0.7–1.3)
CROSSMATCH: NORMAL
CROSSMATCH: NORMAL
GFR SERPL CREATININE-BSD FRML MDRD: >60 ML/MIN/1.73M2
GLUCOSE BLD-MCNC: 109 MG/DL (ref 70–125)
GLUCOSE BLDC GLUCOMTR-MCNC: 102 MG/DL (ref 70–139)
GLUCOSE BLDC GLUCOMTR-MCNC: 117 MG/DL (ref 70–139)
GLUCOSE BLDC GLUCOMTR-MCNC: 149 MG/DL (ref 70–139)
GLUCOSE BLDC GLUCOMTR-MCNC: 90 MG/DL (ref 70–139)
MAGNESIUM SERPL-MCNC: 2.2 MG/DL (ref 1.8–2.6)
POTASSIUM BLD-SCNC: 3.8 MMOL/L (ref 3.5–5)
SODIUM SERPL-SCNC: 130 MMOL/L (ref 136–145)
STATUS (HISTORICAL CONVERSION): NORMAL
STATUS (HISTORICAL CONVERSION): NORMAL
UNIT ABO/RH (HISTORICAL CONVERSION): NORMAL
UNIT ABO/RH (HISTORICAL CONVERSION): NORMAL
UNIT NUMBER: NORMAL
UNIT NUMBER: NORMAL

## 2019-05-04 ASSESSMENT — MIFFLIN-ST. JEOR
SCORE: 1830.3
SCORE: 1830.3

## 2019-05-05 LAB
ANION GAP SERPL CALCULATED.3IONS-SCNC: 11 MMOL/L (ref 5–18)
BUN SERPL-MCNC: 23 MG/DL (ref 8–28)
CALCIUM SERPL-MCNC: 9.5 MG/DL (ref 8.5–10.5)
CHLORIDE BLD-SCNC: 95 MMOL/L (ref 98–107)
CO2 SERPL-SCNC: 25 MMOL/L (ref 22–31)
CREAT SERPL-MCNC: 1.04 MG/DL (ref 0.7–1.3)
GFR SERPL CREATININE-BSD FRML MDRD: >60 ML/MIN/1.73M2
GLUCOSE BLD-MCNC: 125 MG/DL (ref 70–125)
GLUCOSE BLDC GLUCOMTR-MCNC: 101 MG/DL (ref 70–139)
GLUCOSE BLDC GLUCOMTR-MCNC: 114 MG/DL (ref 70–139)
GLUCOSE BLDC GLUCOMTR-MCNC: 119 MG/DL (ref 70–139)
GLUCOSE BLDC GLUCOMTR-MCNC: 90 MG/DL (ref 70–139)
MAGNESIUM SERPL-MCNC: 2.3 MG/DL (ref 1.8–2.6)
PLATELET # BLD AUTO: 177 THOU/UL (ref 140–440)
POTASSIUM BLD-SCNC: 4 MMOL/L (ref 3.5–5)
SODIUM SERPL-SCNC: 131 MMOL/L (ref 136–145)

## 2019-05-05 ASSESSMENT — MIFFLIN-ST. JEOR
SCORE: 1790.84
SCORE: 1790.84

## 2019-05-06 ENCOUNTER — HOME CARE/HOSPICE - HEALTHEAST (OUTPATIENT)
Dept: HOME HEALTH SERVICES | Facility: HOME HEALTH | Age: 74
End: 2019-05-06

## 2019-05-06 LAB
GLUCOSE BLDC GLUCOMTR-MCNC: 100 MG/DL (ref 70–139)
GLUCOSE BLDC GLUCOMTR-MCNC: 118 MG/DL (ref 70–139)
POTASSIUM BLD-SCNC: 3.6 MMOL/L (ref 3.5–5)

## 2019-05-06 ASSESSMENT — MIFFLIN-ST. JEOR
SCORE: 1787.67
SCORE: 1787.67

## 2019-05-13 ENCOUNTER — OFFICE VISIT (OUTPATIENT)
Dept: FAMILY MEDICINE | Facility: CLINIC | Age: 74
End: 2019-05-13
Payer: COMMERCIAL

## 2019-05-13 ENCOUNTER — OFFICE VISIT (OUTPATIENT)
Dept: PHARMACY | Facility: CLINIC | Age: 74
End: 2019-05-13
Payer: COMMERCIAL

## 2019-05-13 VITALS
TEMPERATURE: 97.6 F | RESPIRATION RATE: 16 BRPM | SYSTOLIC BLOOD PRESSURE: 118 MMHG | WEIGHT: 227.6 LBS | BODY MASS INDEX: 31.3 KG/M2 | DIASTOLIC BLOOD PRESSURE: 78 MMHG | HEART RATE: 76 BPM | OXYGEN SATURATION: 97 %

## 2019-05-13 DIAGNOSIS — Z95.1 STATUS POST CORONARY ARTERY BYPASS GRAFT: Primary | ICD-10-CM

## 2019-05-13 DIAGNOSIS — F41.9 ANXIETY: Primary | ICD-10-CM

## 2019-05-13 DIAGNOSIS — Z95.1 STATUS POST CORONARY ARTERY BYPASS GRAFT: ICD-10-CM

## 2019-05-13 DIAGNOSIS — I48.91 ATRIAL FIBRILLATION, UNSPECIFIED TYPE (H): ICD-10-CM

## 2019-05-13 RX ORDER — AMIODARONE HYDROCHLORIDE 200 MG/1
200 TABLET ORAL 2 TIMES DAILY
COMMUNITY
Start: 2019-05-13 | End: 2019-05-15

## 2019-05-13 RX ORDER — HYDROCORTISONE VALERATE 2 MG/G
1 OINTMENT TOPICAL PRN
COMMUNITY
End: 2020-02-13

## 2019-05-13 RX ORDER — ATORVASTATIN CALCIUM 80 MG/1
80 TABLET, FILM COATED ORAL DAILY
COMMUNITY
End: 2021-08-23

## 2019-05-13 RX ORDER — BETAMETHASONE DIPROPIONATE 0.5 MG/G
1 CREAM TOPICAL PRN
COMMUNITY
Start: 2017-05-08 | End: 2020-02-13

## 2019-05-13 RX ORDER — KETOCONAZOLE 20 MG/G
1 CREAM TOPICAL 2 TIMES DAILY PRN
COMMUNITY
End: 2020-02-13

## 2019-05-13 RX ORDER — TRAMADOL HYDROCHLORIDE 50 MG/1
25-50 TABLET ORAL EVERY 6 HOURS PRN
COMMUNITY
Start: 2019-05-06 | End: 2020-02-13

## 2019-05-13 RX ORDER — ALCOHOL 70.47 ML/100ML
1 GEL TOPICAL DAILY
COMMUNITY
End: 2020-02-13

## 2019-05-13 RX ORDER — BISACODYL 5 MG/1
10 TABLET, DELAYED RELEASE ORAL DAILY PRN
COMMUNITY
Start: 2019-05-06 | End: 2020-02-13

## 2019-05-13 RX ORDER — QUETIAPINE FUMARATE 50 MG/1
50 TABLET, FILM COATED ORAL
Qty: 30 TABLET | Refills: 1 | Status: SHIPPED | OUTPATIENT
Start: 2019-05-13 | End: 2020-02-13

## 2019-05-13 RX ORDER — VITAMIN E 200 UNIT
1 CAPSULE ORAL DAILY
COMMUNITY
Start: 2019-05-13 | End: 2020-02-13

## 2019-05-13 RX ORDER — VITAMIN E 268 MG
400 CAPSULE ORAL DAILY
COMMUNITY
End: 2020-02-13

## 2019-05-13 RX ORDER — AMIODARONE HYDROCHLORIDE 200 MG/1
200 TABLET ORAL DAILY
COMMUNITY
End: 2019-05-13

## 2019-05-13 RX ORDER — QUETIAPINE FUMARATE 50 MG/1
50 TABLET, FILM COATED ORAL
COMMUNITY
Start: 2019-05-06 | End: 2019-05-13

## 2019-05-13 RX ORDER — CARBOXYMETHYLCELLULOSE SODIUM 5 MG/ML
1 SOLUTION/ DROPS OPHTHALMIC AT BEDTIME
COMMUNITY
End: 2020-02-13

## 2019-05-13 RX ORDER — ACETAMINOPHEN 500 MG
500-1000 TABLET ORAL EVERY 6 HOURS PRN
COMMUNITY
Start: 2019-05-13 | End: 2020-02-13

## 2019-05-13 NOTE — PROGRESS NOTES
Transitional Care / Medication Management Note                                                       Giacomo was referred by Dr. Ponce for pharmacy services for hospital follow after triple coronary bypass surgery.    MEDICATION REVIEW:  Discussed all medication indications, dosage and effectiveness, adverse effects, and adherence with patient/caregiver.    Pt had meds with them: no  Pt had med list with them: yes  Pt was knowledgeable about meds: yes  Medications set up by: wife  Medications administered by someone else (e.g., LTCF): No  Pt uses a medication box or automated dispenser: no- uses a Lizzie cup system  Patient has been seen by PharmD in past 6 months:  no    Medication Discrepancies  Medications on EMR med list that pt is NOT taking:  yes, amoxicillin, simvastatin  Medications (including OTCs/supplements) pt IS taking that are NOT on EMR med list (e.g., from specialist, hospital): yes, APAP, amiodarone, apixaban, bisacodyl, quetiapine, tramadol, atorvastatin, betamethasone topical, refresh tears, westcort ointment, ketoconazole cream  Dosage or frequency listed differently than how patient is taking: none  Duplicate medication on list (two occurrences of the same medication):  none  TOTAL NUMBER OF MEDICATION DISCREPANCIES:  13    The following medications were added in the hospital:    APAP, amiodarone, apixaban, bisacodyl, quetiapine, tramadol, atorvastatin  The following medications were discontinued in the hospital:    doxycycline  The following medications had dose/frequency changes in the hospital:    none  The following medication changes made in the hospital were not implemented by the patient:    none    Subjective                                                       Patient reports the following problems or concerns with their medications:  none  Patient reports the following adverse reactions to medications:  none  Pt reports missing doses:  never  Additional subjective information (e.g.,  reason for visit, frequency of PRNs, reasons meds were D/C ed):    His wife sets out his meds for each time he takes then in mandeep cups    He is also taking several vitamins/supplements that he is holding right now until he is all better his bypass surgery.  I put these on his medication list.  These supplements are: ascorbic acid, calcium/vit D, krill oil/omega-3 FA, MVI, glucosamine-chondroitin, CECY-E, vitamin D.    He has an albuterol inhaler at home but hasn't used it in 9 months.  He denies any symptoms of SOB, cough, wheeze.  He wants to keep it on his list and keep it on hand in case he has any symptoms.     He said the cardiologist in the hospital said he only needed to be on apixaban for 1-2 months. He feels very strongly about not taking it longer than that because he is active and plays softball and is worried about bleeding injury if hurt.  The reason for the shorter term anticoagulation is likely because they performed ligation of the left atrial appendage.    He has been using the seroquel every night for helping sleep and because he has been experiencing anxiety.    He used tramadol initially but hasn't needed it; his pain is controlled by extra strength tylenol- 2 tablets as needed; usually about twice a day    Objective                                                       Patient Active Problem List   Diagnosis     Atrial fibrillation (H)     Basal cell carcinoma of skin     Esophageal reflux     Hyperlipidemia     Health Care Home     Obstructive sleep apnea     Spermatocele     Impaired fasting glucose     Abnormal abdominal CT scan     BPH (benign prostatic hyperplasia)     H/O cardiovascular stress test     History of colonic polyps       Current Outpatient Medications   Medication Sig Dispense Refill     acetaminophen (TYLENOL) 500 MG tablet Take 1-2 tablets (500-1,000 mg) by mouth every 6 hours as needed       amiodarone (PACERONE/CODARONE) 200 MG tablet Take 1 tablet (200 mg) by mouth 2  times daily       aspirin (ASA) 81 MG EC tablet Take 1 tablet (81 mg) by mouth daily       augmented betamethasone dipropionate (DIPROLENE-AF) 0.05 % external cream Apply 1 Application topically as needed       bisacodyl (DULCOLAX) 5 MG EC tablet Take 10 mg by mouth daily as needed       MEGARED OMEGA-3 KRILL  MG CAPS Take 1 capsule by mouth daily       QUEtiapine (SEROQUEL) 50 MG tablet Take 50 mg by mouth nightly as needed       traMADol (ULTRAM) 50 MG tablet Take 25-50 mg by mouth every 6 hours as needed       albuterol (PROAIR HFA/PROVENTIL HFA/VENTOLIN HFA) 108 (90 BASE) MCG/ACT Inhaler Inhale 2 puffs into the lungs every 4 hours as needed for shortness of breath / dyspnea or wheezing (Patient not taking: Reported on 5/13/2019) 1 Inhaler 1     apixaban ANTICOAGULANT (ELIQUIS) 5 MG tablet Take 5 mg by mouth 2 times daily       ASCORBIC ACID CR PO Take 1 tablet by mouth daily       atorvastatin (LIPITOR) 80 MG tablet Take 80 mg by mouth daily       Calcium Carbonate-Vitamin D3 (CALCIUM 600-D) 600-400 MG-UNIT TABS Take 1 tablet by mouth daily       carboxymethylcellulose PF (CARBOXYMETHYLCELLULOSE SODIUM) 0.5 % SOLN ophthalmic solution Place 1 drop into both eyes At Bedtime       Glucosamine-Chondroitin 250-200 MG TABS Take 2 tablets by mouth daily       hydrocortisone valerate (WEST-SAVANAH) 0.2 % external ointment Apply 1 Application topically as needed       ketoconazole (NIZORAL) 2 % external cream Apply 1 Application topically 2 times daily as needed       metoprolol tartrate (LOPRESSOR) 50 MG tablet Take 50 mg by mouth 2 times daily       multivitamin (THERMEMS) TABS Take 1 tablet by mouth daily       omeprazole (PRILOSEC) 20 MG CR capsule Take 1 capsule (20 mg) by mouth 2 times daily 180 capsule 3     S-Adenosylmethionine (MOOD PLUS CECY-E DOUBLE ST) 400 MG TBEC Take 1 tablet by mouth daily       vitamin E (TOCOPHEROL) 400 units (180 mg) capsule Take 400 Units by mouth daily         Social History      Tobacco Use     Smoking status: Current Some Day Smoker     Smokeless tobacco: Never Used     Tobacco comment: one cigar per day.   Substance Use Topics     Alcohol use: Yes     Comment: rare     Drug use: No       CrCl cannot be calculated (Unknown ideal weight.).    Lab Results   Component Value Date    A1C 5.9 04/12/2018    A1C 5.9 12/06/2016    A1C 5.8 01/28/2014    A1C 6.0 10/22/2012     Last Comprehensive Metabolic Panel:  Sodium   Date Value Ref Range Status   03/07/2019 137.3 132.0 - 142.0 mmol/L Final     Potassium   Date Value Ref Range Status   03/07/2019 4.6 3.2 - 4.6 mmol/dL Final     Chloride   Date Value Ref Range Status   03/07/2019 105.4 98.0 - 110.0 mmol/L Final     Carbon Dioxide   Date Value Ref Range Status   03/07/2019 24.1 20.0 - 32.0 mmol/L Final     Glucose   Date Value Ref Range Status   03/07/2019 98.7 70.0 - 99.0 mg'dL Final     Urea Nitrogen   Date Value Ref Range Status   03/07/2019 15.9 7.0 - 21.0 mg/dL Final     Creatinine   Date Value Ref Range Status   03/07/2019 1.0 0.7 - 1.3 mg/dL Final     GFR Estimate   Date Value Ref Range Status   03/07/2019 77.4 >60.0 mL/min/1.7 m2 Final     Calcium   Date Value Ref Range Status   03/07/2019 9.5 8.5 - 10.1 mg/dL Final       BP Readings from Last 3 Encounters:   03/07/19 149/81   04/12/18 145/85   12/06/16 133/85       The 10-year ASCVD risk score (Eland CRYSTAL Jr., et al., 2013) is: 23.8%    Values used to calculate the score:      Age: 74 years      Sex: Male      Is Non- : No      Diabetic: No      Tobacco smoker: Yes      Systolic Blood Pressure: 118 mmHg      Is BP treated: No      HDL Cholesterol: 43.9 mg/dL      Total Cholesterol: 158.3 mg/dL    PHQ-9 score:  No flowsheet data found.    Assessment / Plan                                                       Updated medication list in the EMR; deleted meds patient no longer taking and added meds patient is now taking, and changed doses where there was a dose  discrepancy.    All medications were reviewed and found to be indicated, effective, safe and convenient/ affordable unless drug therapy problem(s) was/were identified, as are described below.      S/p bypass surgery     Taking all new meds prescribed without side effects or problems    He and his wife understand what all his new medications are for.    I told him I would give him a pill box but then I forgot to give it to him until after he left.    Options for treatment and/or follow-up care were reviewed with the patient.  Giacomo was engaged and actively involved in the decision making process, verbalized understanding of the options discussed, and was satisfied with the final plan.    Patient was provided with written instructions/medication list via AVS.     Follow-up                                                       Medication issues be addressed at a future visit      Provide 3-4x/day pill box at next visit    F/U to determine duration of apixaban (he said he was told to continue to 1-2 months by cardiologist in the hospital)    Determine if he restarted all the natural meds/supplements he was holding.  If he did not restart all of them, could remove them from his med list    Dr. Ponce was provided the recommendations above  in clinic today and Dr. Ponce was available for supervision during this visit and is the authorizing prescriber for this visit through the pharmacist collaborative practice agreement.    Jennifer Craig, DidiD      Drug therapy problems identified- None    # of medical conditions addressed: 1  # of medications addressed: 15  # of medication discrepancies identified: 13  # of DTP identified: 0  Time spent: 20 minutes  Level of service: 1N

## 2019-05-13 NOTE — PROGRESS NOTES
Patient Active Problem List    Diagnosis Date Noted     Status post coronary artery bypass graft 05/13/2019     Priority: Medium     3 vessel CABG   Ligation of atrial appendage in context of a fib       History of colonic polyps 02/10/2017     Priority: Medium     Colonoscopy 1/27/2017 by [facility], Dr. Conroy, showing   Cecum:  One 2mm sessile polyp, removed  Transverse colon:  Three 3-7mm sessile polyps, removed  Sigmoid colon:  One 2mm sessile polyp, removed  Rectum:  One 4mm polyp, removed  Diverticulosis, internal hemorrhoids.         BPH (benign prostatic hyperplasia) 04/14/2015     Priority: Medium     Neg prostate bx 2009.         Impaired fasting glucose 01/28/2014     Priority: Medium     Atrial fibrillation (H) 03/20/2013     Priority: Medium       5/2019:  Ligation of atrial appendage in context of a fib.  Needs eliquis temporarily.         Basal cell carcinoma of skin 03/20/2013     Priority: Medium     Basal Cell Carcinoma Of The Skin 2009 (173.9); left cheek. excised by Dr [facility]         Esophageal reflux 03/20/2013     Priority: Medium     Esophageal Reflux (530.81); benign endoscopy on EGD 6/22/04         Hyperlipidemia 03/20/2013     Priority: Medium     Tier 1  Diagnosis updated by automated process. Provider to review and confirm.       Health Care Home 03/20/2013     Priority: Medium     Tier 1  DX V65.8 REPLACED WITH [id number] HEALTH CARE HOME (04/08/2013)       Obstructive sleep apnea 03/20/2013     Priority: Medium     Obstructive Sleep Apnea (327.23); intolerant of cpap         Spermatocele 03/20/2013     Priority: Medium     Spermatocele (608.1); left testicle confirmed by ultrasound.          Abnormal abdominal CT scan 04/14/2015     Priority: Low     Inguinal hernias noted--planning repair fall 2015  Diverticulosis  Aortic and coronary atherosclerosis  bph       There are no exam notes on file for this visit.  Chief Complaint   Patient presents with     RECHECK     Follow up  from [facility] for tripple bypass Surgery     Blood pressure 118/78, pulse 76, temperature 97.6  F (36.4  C), temperature source Oral, resp. rate 16, weight 103.2 kg (227 lb 9.6 oz), SpO2 97 %.  SUBJECTIVE:  Mehrdad is here for followup of CAD.  He underwent a CABG in early May.  It was a 3-vessel CABG.  He has chronic AFib and doesn't want anticoagulation, so they ligated his left atrial appendage at the same time.  His hospital course was remarkable for anxiety and he was given Seroquel for that, which has helped tremendously.  He still has episodes of anxiety where he wakes up at night two or three times and is breathing fast.  He is able to calm himself down using some relaxation techniques.  Otherwise, he doesn't have much SOB, dizziness, or fatigue.  He has some discomfort where the graft sites were harvested from his left leg and from his chest, but there has been redness or swelling.  He has followup with CV Surgery next week and Cardiology in June.  He doesn't have any dizziness or lightheadedness.  He is only using acetaminophen for pain.  Generally, he is doing quite well, except for his anxiety.      OBJECTIVE:   GENERAL:  Patient is alert, pleasant, and in no acute distress.  He is cantankerous and alert, more so than his baseline, but within his range of normal.   LUNGS:  Clear on the right.  He has decreased breath sounds at the left base.   HEART:  Irregularly irregular.  Normal S1 and S2.  Sternotomy scar is well-healed.   EXTREMITIES:  His left leg reveals some bruising medially, but the incision sites look clean, dry, and intact.  There is no edema.      ASSESSMENT/PLAN:   1.  CAD, status-post CABG in May of this year.  His postoperative pain control is excellent.  I think he can wean off of his IS , as he is doing some walking.  I think his SOB is probably a combination of anxiety, left pleural effusion, and anemia, all of which should be self-limited.  I refilled his Seroquel, as it has been helpful  at night and for his anxiety..  I think that we can see him back in two months.  At that point, we'll get a lipid profile as well.  He'll follow up with Dr. Vee from [facility].  I discussed followup of his AFib-he should not need long-term anticoagulation.  He will need short-term anticoagulation until he sees his cardiologist..  He has followup with CV Surgery next week.  I discussed signs of infection.  He'll follow up with in 6 weeks.  He will need a lipid profile at that time.

## 2019-05-14 ENCOUNTER — AMBULATORY - HEALTHEAST (OUTPATIENT)
Dept: CARDIAC REHAB | Facility: CLINIC | Age: 74
End: 2019-05-14

## 2019-05-14 DIAGNOSIS — Z95.1 S/P CABG (CORONARY ARTERY BYPASS GRAFT): ICD-10-CM

## 2019-05-14 NOTE — PROGRESS NOTES
Transitional Care / Medication Management Note                                                       Giacomo was referred by Dr. Ponce for pharmacy services for hospital follow up after triple bypass surgery    MEDICATION REVIEW:  Discussed all medication indications, dosage and effectiveness, adverse effects, and adherence with patient/caregiver.    Pt had meds with them: no  Pt had med list with them: yes  Pt was knowledgeable about meds: yes  Medications set up by: wife  Medications administered by someone else (e.g., LTCF): {YES (EXPLAIN)/NO:860430}  Pt uses a medication box or automated dispenser: {yes no:982735}  Patient has been seen by PharmD in past 6 months:  {yes no:569371}    Medication Discrepancies  Medications on EMR med list that pt is NOT taking:  {NONE:164799}  Medications (including OTCs/supplements) pt IS taking that are NOT on EMR med list (e.g., from specialist, hospital): {NONE:014242}  Dosage or frequency listed differently than how patient is taking: {NONE:972552}  Duplicate medication on list (two occurrences of the same medication):  {NONE:084473}  TOTAL NUMBER OF MEDICATION DISCREPANCIES:  ***    The following medications were added in the hospital:    ***  The following medications were discontinued in the hospital:    ***  The following medications had dose/frequency changes in the hospital:    ***  The following medication changes made in the hospital were not implemented by the patient:    ***    Subjective                                                       Patient reports the following problems or concerns with their medications:  {NONE:918038}  Patient reports the following adverse reactions to medications:  {NONE:944694}  Pt reports missing doses:  {1/2/3/4/5:723420}  Additional subjective information (e.g., reason for visit, frequency of PRNs, reasons meds were D/C ed):    ***    Objective                                                       Patient Active Problem List    Diagnosis     Atrial fibrillation (H)     Basal cell carcinoma of skin     Esophageal reflux     Hyperlipidemia     Health Care Home     Obstructive sleep apnea     Spermatocele     Impaired fasting glucose     Abnormal abdominal CT scan     BPH (benign prostatic hyperplasia)     H/O cardiovascular stress test     History of colonic polyps       Current Outpatient Medications   Medication Sig Dispense Refill     acetaminophen (TYLENOL) 500 MG tablet Take 1-2 tablets (500-1,000 mg) by mouth every 6 hours as needed       amiodarone (PACERONE/CODARONE) 200 MG tablet Take 1 tablet (200 mg) by mouth 2 times daily       aspirin (ASA) 81 MG EC tablet Take 1 tablet (81 mg) by mouth daily       augmented betamethasone dipropionate (DIPROLENE-AF) 0.05 % external cream Apply 1 Application topically as needed       bisacodyl (DULCOLAX) 5 MG EC tablet Take 10 mg by mouth daily as needed       MEGARED OMEGA-3 KRILL  MG CAPS Take 1 capsule by mouth daily       QUEtiapine (SEROQUEL) 50 MG tablet Take 50 mg by mouth nightly as needed       traMADol (ULTRAM) 50 MG tablet Take 25-50 mg by mouth every 6 hours as needed       albuterol (PROAIR HFA/PROVENTIL HFA/VENTOLIN HFA) 108 (90 BASE) MCG/ACT Inhaler Inhale 2 puffs into the lungs every 4 hours as needed for shortness of breath / dyspnea or wheezing (Patient not taking: Reported on 5/13/2019) 1 Inhaler 1     apixaban ANTICOAGULANT (ELIQUIS) 5 MG tablet Take 5 mg by mouth 2 times daily       ASCORBIC ACID CR PO Take 1 tablet by mouth daily       atorvastatin (LIPITOR) 80 MG tablet Take 80 mg by mouth daily       Calcium Carbonate-Vitamin D3 (CALCIUM 600-D) 600-400 MG-UNIT TABS Take 1 tablet by mouth daily       carboxymethylcellulose PF (CARBOXYMETHYLCELLULOSE SODIUM) 0.5 % SOLN ophthalmic solution Place 1 drop into both eyes At Bedtime       Glucosamine-Chondroitin 250-200 MG TABS Take 2 tablets by mouth daily       hydrocortisone valerate (WEST-SAVANAH) 0.2 % external ointment  Apply 1 Application topically as needed       ketoconazole (NIZORAL) 2 % external cream Apply 1 Application topically 2 times daily as needed       metoprolol tartrate (LOPRESSOR) 50 MG tablet Take 50 mg by mouth 2 times daily       multivitamin (THERMEMS) TABS Take 1 tablet by mouth daily       omeprazole (PRILOSEC) 20 MG CR capsule Take 1 capsule (20 mg) by mouth 2 times daily 180 capsule 3     S-Adenosylmethionine (MOOD PLUS CECY-E DOUBLE ST) 400 MG TBEC Take 1 tablet by mouth daily       vitamin E (TOCOPHEROL) 400 units (180 mg) capsule Take 400 Units by mouth daily         Social History     Tobacco Use     Smoking status: Current Some Day Smoker     Smokeless tobacco: Never Used     Tobacco comment: one cigar per day.   Substance Use Topics     Alcohol use: Yes     Comment: rare     Drug use: No       CrCl cannot be calculated (Unknown ideal weight.).    Lab Results   Component Value Date    A1C 5.9 04/12/2018    A1C 5.9 12/06/2016    A1C 5.8 01/28/2014    A1C 6.0 10/22/2012     Last Comprehensive Metabolic Panel:  Sodium   Date Value Ref Range Status   03/07/2019 137.3 132.0 - 142.0 mmol/L Final     Potassium   Date Value Ref Range Status   03/07/2019 4.6 3.2 - 4.6 mmol/dL Final     Chloride   Date Value Ref Range Status   03/07/2019 105.4 98.0 - 110.0 mmol/L Final     Carbon Dioxide   Date Value Ref Range Status   03/07/2019 24.1 20.0 - 32.0 mmol/L Final     Glucose   Date Value Ref Range Status   03/07/2019 98.7 70.0 - 99.0 mg'dL Final     Urea Nitrogen   Date Value Ref Range Status   03/07/2019 15.9 7.0 - 21.0 mg/dL Final     Creatinine   Date Value Ref Range Status   03/07/2019 1.0 0.7 - 1.3 mg/dL Final     GFR Estimate   Date Value Ref Range Status   03/07/2019 77.4 >60.0 mL/min/1.7 m2 Final     Calcium   Date Value Ref Range Status   03/07/2019 9.5 8.5 - 10.1 mg/dL Final       BP Readings from Last 3 Encounters:   03/07/19 149/81   04/12/18 145/85   12/06/16 133/85       The 10-year ASCVD risk score  "(Samreen ROJAS Jr., et al., 2013) is: 23.8%    Values used to calculate the score:      Age: 74 years      Sex: Male      Is Non- : No      Diabetic: No      Tobacco smoker: Yes      Systolic Blood Pressure: 118 mmHg      Is BP treated: No      HDL Cholesterol: 43.9 mg/dL      Total Cholesterol: 158.3 mg/dL    PHQ-9 score:  No flowsheet data found.            Assessment / Plan                                                       Updated medication list in the EMR; deleted meds patient no longer taking and added meds patient is now taking, and changed doses where there was a dose discrepancy.    *** medications were reviewed and found to be indicated, effective, safe and convenient/ affordable unless drug therapy problem(s) was/were identified, as are described below.      *** - {controlled/uncontrolled:748582::\"controlled\"}     ***    *** - {controlled/uncontrolled:940901::\"controlled\"}     ***    *** - {controlled/uncontrolled:170294::\"controlled\"}     ***    *** - {controlled/uncontrolled:767678::\"controlled\"}     ***    *** - {controlled/uncontrolled:087218::\"controlled\"}     ***    Options for treatment and/or follow-up care were reviewed with the patient.  Giacomo was engaged and actively involved in the decision making process, verbalized understanding of the options discussed, and was satisfied with the final plan.    Patient was provided with written instructions/medication list via ***.     Follow-up                                                       Medication issues be addressed at a future visit      ***     *** was provided the recommendations above  {[facility] PHARMD COMM METHOD:20236125} and  *** was available for supervision during this visit and is the authorizing prescriber for this visit through the pharmacist collaborative practice agreement.    Jennifer Craig, {[facility] Title:102865}      Drug therapy problems identified  1. Med: *** - {BTDTP:457675} - *** - Resolution: " {BTDTPACTION:147095}; {Meghana DTP resolved:670268}  2. Med: *** - {BTDTP:556754} - *** - Resolution: {BTDTPACTION:526974}; {Meghana DTP resolved:567325}   3. Med: *** - {BTDTP:609955} - *** - Resolution: {BTDTPACTION:779808}; {Meghana DTP resolved:883940}   4. Med: *** - {BTDTP:584734} - *** - Resolution: {BTDTPACTION:198736}; {Meghana DTP resolved:975346}  5. Med: *** - {BTDTP:932242} - *** - Resolution: {BTDTPACTION:109614}; {Meghana DTP resolved:177216}    # of medical conditions addressed: ***  # of medications addressed: ***  # of medication discrepancies identified: ***  # of DTP identified: {NUMBERS 0-5:187728}  Time spent: *** minutes  Level of service: {NUMBERS 0-5:292020}

## 2019-05-15 ENCOUNTER — TELEPHONE (OUTPATIENT)
Dept: FAMILY MEDICINE | Facility: CLINIC | Age: 74
End: 2019-05-15

## 2019-05-15 DIAGNOSIS — I48.20 CHRONIC ATRIAL FIBRILLATION (H): Primary | ICD-10-CM

## 2019-05-16 RX ORDER — AMIODARONE HYDROCHLORIDE 200 MG/1
200 TABLET ORAL 2 TIMES DAILY
Qty: 60 TABLET | Refills: 2 | Status: SHIPPED | OUTPATIENT
Start: 2019-05-16 | End: 2019-06-10

## 2019-05-20 ENCOUNTER — AMBULATORY - HEALTHEAST (OUTPATIENT)
Dept: CARDIAC REHAB | Facility: CLINIC | Age: 74
End: 2019-05-20

## 2019-05-20 DIAGNOSIS — Z95.1 S/P CABG (CORONARY ARTERY BYPASS GRAFT): ICD-10-CM

## 2019-05-22 ENCOUNTER — TRANSFERRED RECORDS (OUTPATIENT)
Dept: HEALTH INFORMATION MANAGEMENT | Facility: CLINIC | Age: 74
End: 2019-05-22

## 2019-05-22 ENCOUNTER — AMBULATORY - HEALTHEAST (OUTPATIENT)
Dept: CARDIAC REHAB | Facility: CLINIC | Age: 74
End: 2019-05-22

## 2019-05-22 ENCOUNTER — OFFICE VISIT - HEALTHEAST (OUTPATIENT)
Dept: CARDIOLOGY | Facility: CLINIC | Age: 74
End: 2019-05-22

## 2019-05-22 DIAGNOSIS — Z95.1 S/P CABG (CORONARY ARTERY BYPASS GRAFT): ICD-10-CM

## 2019-05-22 ASSESSMENT — MIFFLIN-ST. JEOR: SCORE: 1758.64

## 2019-05-28 PROBLEM — Z95.1 STATUS POST CORONARY ARTERY BYPASS GRAFT: Status: ACTIVE | Noted: 2019-05-01

## 2019-05-29 ENCOUNTER — AMBULATORY - HEALTHEAST (OUTPATIENT)
Dept: CARDIAC REHAB | Facility: CLINIC | Age: 74
End: 2019-05-29

## 2019-05-29 DIAGNOSIS — Z95.1 S/P CABG (CORONARY ARTERY BYPASS GRAFT): ICD-10-CM

## 2019-06-03 ENCOUNTER — COMMUNICATION - HEALTHEAST (OUTPATIENT)
Dept: CARDIOLOGY | Facility: CLINIC | Age: 74
End: 2019-06-03

## 2019-06-03 ENCOUNTER — AMBULATORY - HEALTHEAST (OUTPATIENT)
Dept: CARDIAC REHAB | Facility: CLINIC | Age: 74
End: 2019-06-03

## 2019-06-03 DIAGNOSIS — Z95.1 S/P CABG (CORONARY ARTERY BYPASS GRAFT): ICD-10-CM

## 2019-06-05 ENCOUNTER — AMBULATORY - HEALTHEAST (OUTPATIENT)
Dept: CARDIAC REHAB | Facility: CLINIC | Age: 74
End: 2019-06-05

## 2019-06-05 DIAGNOSIS — Z95.1 S/P CABG (CORONARY ARTERY BYPASS GRAFT): ICD-10-CM

## 2019-06-07 ENCOUNTER — TRANSFERRED RECORDS (OUTPATIENT)
Dept: HEALTH INFORMATION MANAGEMENT | Facility: CLINIC | Age: 74
End: 2019-06-07

## 2019-06-07 ENCOUNTER — OFFICE VISIT - HEALTHEAST (OUTPATIENT)
Dept: CARDIOLOGY | Facility: CLINIC | Age: 74
End: 2019-06-07

## 2019-06-07 DIAGNOSIS — I48.19 PERSISTENT ATRIAL FIBRILLATION (H): ICD-10-CM

## 2019-06-07 ASSESSMENT — MIFFLIN-ST. JEOR: SCORE: 1779.18

## 2019-06-10 ENCOUNTER — AMBULATORY - HEALTHEAST (OUTPATIENT)
Dept: CARDIAC REHAB | Facility: CLINIC | Age: 74
End: 2019-06-10

## 2019-06-10 DIAGNOSIS — Z95.1 S/P CABG (CORONARY ARTERY BYPASS GRAFT): ICD-10-CM

## 2019-06-10 RX ORDER — METOPROLOL TARTRATE 50 MG
25 TABLET ORAL 2 TIMES DAILY
Qty: 0.1 TABLET | COMMUNITY
Start: 2019-06-10 | End: 2020-05-19

## 2019-06-12 ENCOUNTER — AMBULATORY - HEALTHEAST (OUTPATIENT)
Dept: CARDIAC REHAB | Facility: CLINIC | Age: 74
End: 2019-06-12

## 2019-06-12 DIAGNOSIS — Z95.1 S/P CABG (CORONARY ARTERY BYPASS GRAFT): ICD-10-CM

## 2019-06-17 ENCOUNTER — TRANSFERRED RECORDS (OUTPATIENT)
Dept: HEALTH INFORMATION MANAGEMENT | Facility: CLINIC | Age: 74
End: 2019-06-17

## 2019-06-18 ENCOUNTER — AMBULATORY - HEALTHEAST (OUTPATIENT)
Dept: CARDIOLOGY | Facility: CLINIC | Age: 74
End: 2019-06-18

## 2019-06-18 DIAGNOSIS — I48.0 PAROXYSMAL ATRIAL FIBRILLATION (H): ICD-10-CM

## 2019-06-18 DIAGNOSIS — Z98.890 STATUS POST LIGATION OF LEFT ATRIAL APPENDAGE: ICD-10-CM

## 2019-06-19 ENCOUNTER — HOSPITAL ENCOUNTER (OUTPATIENT)
Dept: CT IMAGING | Facility: CLINIC | Age: 74
Discharge: HOME | End: 2019-06-19
Attending: INTERNAL MEDICINE

## 2019-06-19 DIAGNOSIS — I48.0 PAROXYSMAL ATRIAL FIBRILLATION (H): ICD-10-CM

## 2019-06-19 DIAGNOSIS — Z98.890 STATUS POST LIGATION OF LEFT ATRIAL APPENDAGE: ICD-10-CM

## 2019-06-19 LAB
CREAT BLD-MCNC: 1.2 MG/DL (ref 0.7–1.3)
GFR SERPL CREATININE-BSD FRML MDRD: 59 ML/MIN/1.73M2

## 2019-06-19 ASSESSMENT — MIFFLIN-ST. JEOR: SCORE: 1764.99

## 2019-06-20 ENCOUNTER — AMBULATORY - HEALTHEAST (OUTPATIENT)
Dept: CARDIAC REHAB | Facility: CLINIC | Age: 74
End: 2019-06-20

## 2019-06-20 ENCOUNTER — OFFICE VISIT - HEALTHEAST (OUTPATIENT)
Dept: CARDIOLOGY | Facility: CLINIC | Age: 74
End: 2019-06-20

## 2019-06-20 ENCOUNTER — TRANSFERRED RECORDS (OUTPATIENT)
Dept: HEALTH INFORMATION MANAGEMENT | Facility: CLINIC | Age: 74
End: 2019-06-20

## 2019-06-20 DIAGNOSIS — I48.19 PERSISTENT ATRIAL FIBRILLATION (H): ICD-10-CM

## 2019-06-20 DIAGNOSIS — I10 ESSENTIAL HYPERTENSION: ICD-10-CM

## 2019-06-20 DIAGNOSIS — E78.2 MIXED HYPERLIPIDEMIA: ICD-10-CM

## 2019-06-20 DIAGNOSIS — I25.810 CORONARY ARTERY DISEASE INVOLVING CORONARY BYPASS GRAFT OF NATIVE HEART WITHOUT ANGINA PECTORIS: ICD-10-CM

## 2019-06-20 DIAGNOSIS — Z95.1 S/P CABG (CORONARY ARTERY BYPASS GRAFT): ICD-10-CM

## 2019-06-20 ASSESSMENT — MIFFLIN-ST. JEOR: SCORE: 1774.06

## 2019-06-21 DIAGNOSIS — K21.9 GASTROESOPHAGEAL REFLUX DISEASE WITHOUT ESOPHAGITIS: ICD-10-CM

## 2019-06-21 NOTE — TELEPHONE ENCOUNTER
[facility] phone call message- patient requesting a refill:    Full Medication Name: omeprazole (PRILOSEC) 20 MG DR capsule    Dose: Take 1 capsule (20 mg) by mouth 2 times daily     Pharmacy confirmed as   [facility] [address] [place] [address] [address] [facility] [address]  Phone: [phone] Fax: [phone]  : Yes    Additional Comments: NONE    OK to leave a message on voice mail? Yes    Primary language: English      needed? No    Call taken on June 21, 2019 at 2:12 PM by Jefry Yuen

## 2019-06-24 ENCOUNTER — AMBULATORY - HEALTHEAST (OUTPATIENT)
Dept: CARDIAC REHAB | Facility: CLINIC | Age: 74
End: 2019-06-24

## 2019-06-24 DIAGNOSIS — Z95.1 S/P CABG (CORONARY ARTERY BYPASS GRAFT): ICD-10-CM

## 2019-06-28 ENCOUNTER — COMMUNICATION - HEALTHEAST (OUTPATIENT)
Dept: CARDIOLOGY | Facility: CLINIC | Age: 74
End: 2019-06-28

## 2019-06-28 DIAGNOSIS — Z95.1 S/P CABG (CORONARY ARTERY BYPASS GRAFT): ICD-10-CM

## 2019-07-01 ENCOUNTER — AMBULATORY - HEALTHEAST (OUTPATIENT)
Dept: CARDIAC REHAB | Facility: CLINIC | Age: 74
End: 2019-07-01

## 2019-07-01 DIAGNOSIS — Z95.1 S/P CABG (CORONARY ARTERY BYPASS GRAFT): ICD-10-CM

## 2019-07-03 ENCOUNTER — AMBULATORY - HEALTHEAST (OUTPATIENT)
Dept: CARDIAC REHAB | Facility: CLINIC | Age: 74
End: 2019-07-03

## 2019-07-03 ENCOUNTER — COMMUNICATION - HEALTHEAST (OUTPATIENT)
Dept: CARDIOLOGY | Facility: CLINIC | Age: 74
End: 2019-07-03

## 2019-07-03 DIAGNOSIS — Z95.1 S/P CABG (CORONARY ARTERY BYPASS GRAFT): ICD-10-CM

## 2019-07-05 LAB
BSA FOR ECHO PROCEDURE: 2.26 M2
CCTA EJECTION FRACTION ESTIMATED: 55 %

## 2019-07-08 ENCOUNTER — AMBULATORY - HEALTHEAST (OUTPATIENT)
Dept: CARDIAC REHAB | Facility: CLINIC | Age: 74
End: 2019-07-08

## 2019-07-08 DIAGNOSIS — Z95.1 S/P CABG (CORONARY ARTERY BYPASS GRAFT): ICD-10-CM

## 2019-07-10 ENCOUNTER — AMBULATORY - HEALTHEAST (OUTPATIENT)
Dept: CARDIAC REHAB | Facility: CLINIC | Age: 74
End: 2019-07-10

## 2019-07-10 DIAGNOSIS — Z95.1 S/P CABG (CORONARY ARTERY BYPASS GRAFT): ICD-10-CM

## 2019-07-15 ENCOUNTER — AMBULATORY - HEALTHEAST (OUTPATIENT)
Dept: CARDIAC REHAB | Facility: CLINIC | Age: 74
End: 2019-07-15

## 2019-07-15 DIAGNOSIS — Z95.1 S/P CABG (CORONARY ARTERY BYPASS GRAFT): ICD-10-CM

## 2019-07-17 ENCOUNTER — AMBULATORY - HEALTHEAST (OUTPATIENT)
Dept: CARDIAC REHAB | Facility: CLINIC | Age: 74
End: 2019-07-17

## 2019-07-17 DIAGNOSIS — Z95.1 S/P CABG (CORONARY ARTERY BYPASS GRAFT): ICD-10-CM

## 2019-07-22 ENCOUNTER — AMBULATORY - HEALTHEAST (OUTPATIENT)
Dept: CARDIAC REHAB | Facility: CLINIC | Age: 74
End: 2019-07-22

## 2019-07-22 ENCOUNTER — TRANSFERRED RECORDS (OUTPATIENT)
Dept: HEALTH INFORMATION MANAGEMENT | Facility: CLINIC | Age: 74
End: 2019-07-22

## 2019-07-22 DIAGNOSIS — Z95.1 S/P CABG (CORONARY ARTERY BYPASS GRAFT): ICD-10-CM

## 2019-07-24 ENCOUNTER — AMBULATORY - HEALTHEAST (OUTPATIENT)
Dept: CARDIAC REHAB | Facility: CLINIC | Age: 74
End: 2019-07-24

## 2019-07-24 DIAGNOSIS — Z95.1 S/P CABG (CORONARY ARTERY BYPASS GRAFT): ICD-10-CM

## 2019-07-29 ENCOUNTER — AMBULATORY - HEALTHEAST (OUTPATIENT)
Dept: CARDIAC REHAB | Facility: CLINIC | Age: 74
End: 2019-07-29

## 2019-07-29 DIAGNOSIS — Z95.1 S/P CABG (CORONARY ARTERY BYPASS GRAFT): ICD-10-CM

## 2019-07-31 ENCOUNTER — AMBULATORY - HEALTHEAST (OUTPATIENT)
Dept: CARDIAC REHAB | Facility: CLINIC | Age: 74
End: 2019-07-31

## 2019-07-31 DIAGNOSIS — Z95.1 S/P CABG (CORONARY ARTERY BYPASS GRAFT): ICD-10-CM

## 2019-08-07 ENCOUNTER — AMBULATORY - HEALTHEAST (OUTPATIENT)
Dept: CARDIAC REHAB | Facility: CLINIC | Age: 74
End: 2019-08-07

## 2019-08-07 DIAGNOSIS — Z95.1 S/P CABG (CORONARY ARTERY BYPASS GRAFT): ICD-10-CM

## 2019-08-12 ENCOUNTER — AMBULATORY - HEALTHEAST (OUTPATIENT)
Dept: CARDIAC REHAB | Facility: CLINIC | Age: 74
End: 2019-08-12

## 2019-08-12 DIAGNOSIS — Z95.1 S/P CABG (CORONARY ARTERY BYPASS GRAFT): ICD-10-CM

## 2019-08-14 ENCOUNTER — AMBULATORY - HEALTHEAST (OUTPATIENT)
Dept: CARDIAC REHAB | Facility: CLINIC | Age: 74
End: 2019-08-14

## 2019-08-14 DIAGNOSIS — Z95.1 S/P CABG (CORONARY ARTERY BYPASS GRAFT): ICD-10-CM

## 2019-08-19 ENCOUNTER — TRANSFERRED RECORDS (OUTPATIENT)
Dept: HEALTH INFORMATION MANAGEMENT | Facility: CLINIC | Age: 74
End: 2019-08-19

## 2019-08-19 ENCOUNTER — AMBULATORY - HEALTHEAST (OUTPATIENT)
Dept: CARDIAC REHAB | Facility: CLINIC | Age: 74
End: 2019-08-19

## 2019-08-19 DIAGNOSIS — Z95.1 S/P CABG (CORONARY ARTERY BYPASS GRAFT): ICD-10-CM

## 2019-08-21 ENCOUNTER — OFFICE VISIT - HEALTHEAST (OUTPATIENT)
Dept: CARDIOLOGY | Facility: CLINIC | Age: 74
End: 2019-08-21

## 2019-08-21 ENCOUNTER — AMBULATORY - HEALTHEAST (OUTPATIENT)
Dept: CARDIAC REHAB | Facility: CLINIC | Age: 74
End: 2019-08-21

## 2019-08-21 ENCOUNTER — TRANSFERRED RECORDS (OUTPATIENT)
Dept: HEALTH INFORMATION MANAGEMENT | Facility: CLINIC | Age: 74
End: 2019-08-21

## 2019-08-21 DIAGNOSIS — E78.2 MIXED HYPERLIPIDEMIA: ICD-10-CM

## 2019-08-21 DIAGNOSIS — I25.810 CORONARY ARTERY DISEASE INVOLVING CORONARY BYPASS GRAFT OF NATIVE HEART WITHOUT ANGINA PECTORIS: ICD-10-CM

## 2019-08-21 DIAGNOSIS — I48.19 PERSISTENT ATRIAL FIBRILLATION (H): ICD-10-CM

## 2019-08-21 DIAGNOSIS — I10 ESSENTIAL HYPERTENSION: ICD-10-CM

## 2019-08-21 DIAGNOSIS — Z95.1 S/P CABG (CORONARY ARTERY BYPASS GRAFT): ICD-10-CM

## 2019-08-21 ASSESSMENT — MIFFLIN-ST. JEOR: SCORE: 1792.2

## 2019-08-26 ENCOUNTER — AMBULATORY - HEALTHEAST (OUTPATIENT)
Dept: CARDIAC REHAB | Facility: CLINIC | Age: 74
End: 2019-08-26

## 2019-08-26 DIAGNOSIS — Z95.1 S/P CABG (CORONARY ARTERY BYPASS GRAFT): ICD-10-CM

## 2019-08-27 ENCOUNTER — TRANSFERRED RECORDS (OUTPATIENT)
Dept: HEALTH INFORMATION MANAGEMENT | Facility: CLINIC | Age: 74
End: 2019-08-27

## 2019-08-27 ENCOUNTER — OFFICE VISIT - HEALTHEAST (OUTPATIENT)
Dept: CARDIOLOGY | Facility: CLINIC | Age: 74
End: 2019-08-27

## 2019-08-27 DIAGNOSIS — Z48.02 VISIT FOR SUTURE REMOVAL: ICD-10-CM

## 2019-08-28 ENCOUNTER — AMBULATORY - HEALTHEAST (OUTPATIENT)
Dept: CARDIAC REHAB | Facility: CLINIC | Age: 74
End: 2019-08-28

## 2019-08-28 DIAGNOSIS — Z95.1 S/P CABG (CORONARY ARTERY BYPASS GRAFT): ICD-10-CM

## 2019-09-03 ENCOUNTER — COMMUNICATION - HEALTHEAST (OUTPATIENT)
Dept: CARDIOLOGY | Facility: CLINIC | Age: 74
End: 2019-09-03

## 2019-09-04 ENCOUNTER — COMMUNICATION - HEALTHEAST (OUTPATIENT)
Dept: PHARMACY | Facility: CLINIC | Age: 74
End: 2019-09-04

## 2019-09-04 ENCOUNTER — AMBULATORY - HEALTHEAST (OUTPATIENT)
Dept: CARDIOLOGY | Facility: CLINIC | Age: 74
End: 2019-09-04

## 2019-09-04 ENCOUNTER — SURGERY - HEALTHEAST (OUTPATIENT)
Dept: CARDIOLOGY | Facility: CLINIC | Age: 74
End: 2019-09-04

## 2019-09-04 DIAGNOSIS — I48.0 PAROXYSMAL ATRIAL FIBRILLATION (H): ICD-10-CM

## 2019-09-04 DIAGNOSIS — Z95.1 S/P CABG (CORONARY ARTERY BYPASS GRAFT): ICD-10-CM

## 2019-09-06 ENCOUNTER — AMBULATORY - HEALTHEAST (OUTPATIENT)
Dept: CARDIOLOGY | Facility: CLINIC | Age: 74
End: 2019-09-06

## 2019-09-09 ENCOUNTER — AMBULATORY - HEALTHEAST (OUTPATIENT)
Dept: CARDIAC REHAB | Facility: CLINIC | Age: 74
End: 2019-09-09

## 2019-09-09 ENCOUNTER — SURGERY - HEALTHEAST (OUTPATIENT)
Dept: CARDIOLOGY | Facility: CLINIC | Age: 74
End: 2019-09-09

## 2019-09-09 ENCOUNTER — AMBULATORY - HEALTHEAST (OUTPATIENT)
Dept: CARDIOLOGY | Facility: CLINIC | Age: 74
End: 2019-09-09

## 2019-09-09 DIAGNOSIS — I48.91 ATRIAL FIBRILLATION, UNSPECIFIED TYPE (H): ICD-10-CM

## 2019-09-09 DIAGNOSIS — Z95.1 S/P CABG (CORONARY ARTERY BYPASS GRAFT): ICD-10-CM

## 2019-09-11 ENCOUNTER — AMBULATORY - HEALTHEAST (OUTPATIENT)
Dept: CARDIAC REHAB | Facility: CLINIC | Age: 74
End: 2019-09-11

## 2019-09-11 DIAGNOSIS — Z95.1 S/P CABG (CORONARY ARTERY BYPASS GRAFT): ICD-10-CM

## 2019-09-18 ENCOUNTER — AMBULATORY - HEALTHEAST (OUTPATIENT)
Dept: CARDIAC REHAB | Facility: CLINIC | Age: 74
End: 2019-09-18

## 2019-09-18 DIAGNOSIS — Z95.1 S/P CABG (CORONARY ARTERY BYPASS GRAFT): ICD-10-CM

## 2019-09-19 ENCOUNTER — OFFICE VISIT (OUTPATIENT)
Dept: FAMILY MEDICINE | Facility: CLINIC | Age: 74
End: 2019-09-19
Payer: COMMERCIAL

## 2019-09-19 VITALS
HEART RATE: 73 BPM | BODY MASS INDEX: 31.49 KG/M2 | RESPIRATION RATE: 16 BRPM | TEMPERATURE: 97.7 F | WEIGHT: 229 LBS | OXYGEN SATURATION: 97 % | SYSTOLIC BLOOD PRESSURE: 148 MMHG | DIASTOLIC BLOOD PRESSURE: 81 MMHG

## 2019-09-19 DIAGNOSIS — I48.91 ATRIAL FIBRILLATION, UNSPECIFIED TYPE (H): ICD-10-CM

## 2019-09-19 DIAGNOSIS — F41.9 ANXIETY DUE TO INVASIVE PROCEDURE: Primary | ICD-10-CM

## 2019-09-19 DIAGNOSIS — Z95.1 STATUS POST CORONARY ARTERY BYPASS GRAFT: ICD-10-CM

## 2019-09-19 RX ORDER — DIAZEPAM 5 MG
TABLET ORAL
Qty: 1 TABLET | Refills: 0 | Status: SHIPPED | OUTPATIENT
Start: 2019-09-19 | End: 2019-10-11

## 2019-09-20 NOTE — PROGRESS NOTES
Patient Active Problem List    Diagnosis Date Noted     Status post coronary artery bypass graft 05/01/2019     Priority: Medium     3 vessel CABG:  SULTANA to LAD,  Separate reverse saph to vein to LAD branch and right PDA  Ligation of atrial appendage in context of a fib  Dr Ashley Bai        History of colonic polyps 02/10/2017     Priority: Medium     Colonoscopy 1/27/2017 by [facility], Dr. Conroy, showing   Cecum:  One 2mm sessile polyp, removed  Transverse colon:  Three 3-7mm sessile polyps, removed  Sigmoid colon:  One 2mm sessile polyp, removed  Rectum:  One 4mm polyp, removed  Diverticulosis, internal hemorrhoids.         BPH (benign prostatic hyperplasia) 04/14/2015     Priority: Medium     Neg prostate bx 2009.         Impaired fasting glucose 01/28/2014     Priority: Medium     Atrial fibrillation (H) 03/20/2013     Priority: Medium       5/2019:  Ligation of atrial appendage in context of a fib.  Needs eliquis temporarily.         Basal cell carcinoma of skin 03/20/2013     Priority: Medium     Basal Cell Carcinoma Of The Skin 2009 (173.9); left cheek. excised by Dr Hernandez [facility]         Esophageal reflux 03/20/2013     Priority: Medium     Esophageal Reflux (530.81); benign endoscopy on EGD 6/22/04         Hyperlipidemia 03/20/2013     Priority: Medium     Tier 1  Diagnosis updated by automated process. Provider to review and confirm.       Health Care Home 03/20/2013     Priority: Medium     Tier 1  DX V65.8 REPLACED WITH [id number] [facility]ALTH CARE HOME (04/08/2013)       Obstructive sleep apnea 03/20/2013     Priority: Medium     Obstructive Sleep Apnea (327.23); intolerant of cpap         Spermatocele 03/20/2013     Priority: Medium     Spermatocele (608.1); left testicle confirmed by ultrasound.          Abnormal abdominal CT scan 04/14/2015     Priority: Low     Inguinal hernias noted--planning repair fall 2015  Diverticulosis  Aortic and coronary atherosclerosis  bph       There are no exam notes on  file for this visit.  Chief Complaint   Patient presents with     Pre-Op Exam     come here today for pre-op, will have NATALIO exam next Thursday 09/26/2019 at [facility] per patient.      other     have two other things will talk to the doctor about per patient.      Medication Reconciliation     reviewed.      Blood pressure (!) 148/81, pulse 73, temperature 97.7  F (36.5  C), temperature source Oral, resp. rate 16, weight 103.9 kg (229 lb), SpO2 97 %.  SUBJECTIVE: Mehrdad Schneider, a well-known patient of mine, is a 74-year-old male with CAD status post bypass and AFib who had ligation of his atrial appendage to try to decrease his need for ongoing anticoagulation.  He is still on oral anticoagulation because they have been unable to improve closure of his left atrial appendage.  The procedure of choice was a NATALIO at first and this couldn't be done because of esophageal spasm.  He then had a CT, which did show some residual opening in the connection between his left atrium and left atrial appendage.  It was small, and ongoing anticoagulation was recommended.  He didn't tolerate the CT because of positioning; in fact, he had two weeks of severe back pain requiring multiple chiropractic visits in order to resolve this.  He would like to avoid going through that procedure if at all possible, and he is asking me to help coordinate the evaluation of his left atrial appendage closure to see if we can avoid both anticoagulation and avoid a CT again in the future.  Otherwise, he is generally doing well.   He has been somewhat frustrated in that he has been going to cardiac rehab, trying to get back in shape with the goal of being able to play softball.  He is happy if he is able to run to first base playing softball.   When he has run in cardiac rehab, he has run for a minute at a time and then he'll get SOB and even have some ST depression noted on his cardiac rehab.  With his normal day-to-day activities and running  short distances he feels fine.  He feels like running for a minute is pushing it, given his comorbidities.  He is wondering what interventions could happen.  He notes that his blood pressure at home is typically in the 120s-130s and his pulse is in the 70s.  He knows that after exercise his blood pressure will commonly be in the 90s at cardiac rehab.  He isn't interested in changing medications if at all possible.  His primary goal is to be able to do his ADLs and play softball.  He would like to also avoid anticoagulation if that is possible, which was the reason for the left atrial appendage closure in the first place.      OBJECTIVE:   VITAL SIGNS:  Blood pressure is a little high today at 148/81.  Pulse is 73.   CHEST:  Clear.   HEART:  Irregularly irregular.      ASSESSMENT/PLAN   1.  AFib.  I discussed that with his CHADS-VASc score of 3 his risk of stroke is 3% per year and that anticoagulation is generally recommended unless contraindicated or not preferred.  I do think that attempts should be made to try to prove his left atrial appendage is closed.  I spoke with Dr. Chi, his cardiologist at [facility], who said that the basic options are NATALIO and CT.  Since NATALIO isn't possible, we'll try to keep him as comfortable as possible in a CT scan, trying to limit his time on a machine and limit his pain.  I prescribed him diazepam 5 mg to take an hour prior to the procedure.  I spoke with Dr. Chi, who will arrange cancellation of theTEE and scheduling of the CT to look at the pulmonary veins.  Mr. Schneider will follow up with me on a p.r.n. basis.   2.  Exercise tolerance.  I really think that working cardiac rehab and limiting the activities he needs to function to play softball is the key, and he'll talk to them about that.  We could consider increasing his beta blocker, but I don't think that is necessary in that he could have some more hypotension after exercise, and his rate and blood pressure control are quite  reasonable at this time.  Followup with me will be as needed.

## 2019-09-23 ENCOUNTER — SURGERY - HEALTHEAST (OUTPATIENT)
Dept: CARDIOLOGY | Facility: CLINIC | Age: 74
End: 2019-09-23

## 2019-09-23 ENCOUNTER — AMBULATORY - HEALTHEAST (OUTPATIENT)
Dept: CARDIAC REHAB | Facility: CLINIC | Age: 74
End: 2019-09-23

## 2019-09-23 DIAGNOSIS — Z95.1 S/P CABG (CORONARY ARTERY BYPASS GRAFT): ICD-10-CM

## 2019-09-25 ENCOUNTER — AMBULATORY - HEALTHEAST (OUTPATIENT)
Dept: CARDIAC REHAB | Facility: CLINIC | Age: 74
End: 2019-09-25

## 2019-09-25 DIAGNOSIS — Z95.1 S/P CABG (CORONARY ARTERY BYPASS GRAFT): ICD-10-CM

## 2019-09-26 ENCOUNTER — COMMUNICATION - HEALTHEAST (OUTPATIENT)
Dept: CARDIOLOGY | Facility: CLINIC | Age: 74
End: 2019-09-26

## 2019-09-26 ENCOUNTER — HOSPITAL ENCOUNTER (OUTPATIENT)
Dept: CARDIOLOGY | Facility: CLINIC | Age: 74
Discharge: HOME | End: 2019-09-26

## 2019-09-26 DIAGNOSIS — Z98.890 STATUS POST LIGATION OF LEFT ATRIAL APPENDAGE: ICD-10-CM

## 2019-09-26 DIAGNOSIS — I48.0 PAROXYSMAL ATRIAL FIBRILLATION (H): ICD-10-CM

## 2019-09-27 ENCOUNTER — OFFICE VISIT (OUTPATIENT)
Dept: FAMILY MEDICINE | Facility: CLINIC | Age: 74
End: 2019-09-27
Payer: COMMERCIAL

## 2019-09-27 VITALS
SYSTOLIC BLOOD PRESSURE: 128 MMHG | DIASTOLIC BLOOD PRESSURE: 81 MMHG | OXYGEN SATURATION: 98 % | RESPIRATION RATE: 16 BRPM | TEMPERATURE: 97.5 F | WEIGHT: 229.8 LBS | HEART RATE: 93 BPM | BODY MASS INDEX: 31.6 KG/M2

## 2019-09-27 DIAGNOSIS — K14.0 SUBLINGUAL INFECTION: ICD-10-CM

## 2019-09-27 DIAGNOSIS — I48.91 ATRIAL FIBRILLATION, UNSPECIFIED TYPE (H): Primary | ICD-10-CM

## 2019-09-27 RX ORDER — AMOXICILLIN 500 MG/1
1000 CAPSULE ORAL 2 TIMES DAILY
Qty: 28 CAPSULE | Refills: 0 | Status: SHIPPED | OUTPATIENT
Start: 2019-09-27 | End: 2019-11-03

## 2019-09-27 NOTE — PROGRESS NOTES
Patient Active Problem List    Diagnosis Date Noted     Status post coronary artery bypass graft 05/01/2019     Priority: Medium     3 vessel CABG:  SULTANA to LAD,  Separate reverse saph to vein to LAD branch and right PDA  Ligation of atrial appendage in context of a fib  Dr Ashley Bai        History of colonic polyps 02/10/2017     Priority: Medium     Colonoscopy 1/27/2017 by [facility], Dr. Conroy, showing   Cecum:  One 2mm sessile polyp, removed  Transverse colon:  Three 3-7mm sessile polyps, removed  Sigmoid colon:  One 2mm sessile polyp, removed  Rectum:  One 4mm polyp, removed  Diverticulosis, internal hemorrhoids.         BPH (benign prostatic hyperplasia) 04/14/2015     Priority: Medium     Neg prostate bx 2009.         Impaired fasting glucose 01/28/2014     Priority: Medium     Atrial fibrillation (H) 03/20/2013     Priority: Medium       5/2019:  Ligation of atrial appendage in context of a fib.  Needs eliquis temporarily.         Basal cell carcinoma of skin 03/20/2013     Priority: Medium     Basal Cell Carcinoma Of The Skin 2009 (173.9); left cheek. excised by Dr Hernandez @ [facility]         Esophageal reflux 03/20/2013     Priority: Medium     Esophageal Reflux (530.81); benign endoscopy on EGD 6/22/04         Hyperlipidemia 03/20/2013     Priority: Medium     Tier 1  Diagnosis updated by automated process. Provider to review and confirm.       Health Care Home 03/20/2013     Priority: Medium     Tier 1  DX V65.8 REPLACED WITH [id number] HEALTH CARE HOME (04/08/2013)       Obstructive sleep apnea 03/20/2013     Priority: Medium     Obstructive Sleep Apnea (327.23); intolerant of cpap         Spermatocele 03/20/2013     Priority: Medium     Spermatocele (608.1); left testicle confirmed by ultrasound.          Abnormal abdominal CT scan 04/14/2015     Priority: Low     Inguinal hernias noted--planning repair fall 2015  Diverticulosis  Aortic and coronary atherosclerosis  bph       There are no exam notes on  file for this visit.  Chief Complaint   Patient presents with     Counseling     Pt christine to talk to you personally     Blood pressure 128/81, pulse 93, temperature 97.5  F (36.4  C), temperature source Oral, resp. rate 16, weight 104.2 kg (229 lb 12.8 oz), SpO2 98 %.  SUBJECTIVE:  Mehrdad Schneider is here for several issues.     First of all, he had progression of a sore throat which he had last time that was associated with swollen tonsils as well.  He was using a mouthpiece for sleep apnea, and when he removed the mouthpiece the swelling improved, but it isn't completely resolved.  He has some tenderness along the gums as well and is wondering if some antibiotics would be reasonable.   He doesn't have fevers, chills, or sweats.  The swelling is actually improving.   He continues to have AFib, and he has had a hard time getting in with Dr. Chi to get the CT scheduled.  He is wondering what the next step might be.  He also mentions that when he goes to cardiac rehab, at the end the nurses will ask him to stop early because of abnormalities that they are seeing on his EKG.  I reviewed his chart and they are seeing ST depression that occurs at the end of his event which resolves with rest.   He continues to have symptoms of a vague bilateral discomfort in his chest.  It seems not clearly related to movement.  It isn't clearly worsened by activity or relieved by rest.   He also wants to know if he should continue his Eliquis while we're awaiting evaluation of the occlusion of his left atrial appendage.      ROS:  Negative for any abdominal discomfort, pain at night, weight loss, nausea, vomiting, diarrhea, constipation or effect on his appetite.      OBJECTIVE:   CHEST:  Clear.  No chest wall tenderness.   HEART:  Irregularly irregular.  Pulse and blood pressure are normal.    [Next section is unclear]   HEENT:  I did examine his mouth.  He does have some prominent sublingual glands.  The right tonsil is mildly reddened and  edematous as well.      ASSESSMENT/PLAN:   1.  Sublingual gland infection, probably triggered by mechanical obstruction from his device.  We'll put him on a week of amoxicillin one gram b.i.d.   2.  AFib.  I refilled his Eliquis. We'll contact Dr. Chi to help get his CT study arranged.   3.  Atypical chest pressure.  He has had coronary revascularization in April, but he has some suggestions of angina, or at least coronary ischemia, based on the results of the monitoring in cardiac rehabilitation.  I'll talk to Dr. Chi one more time on Monday and get his suggestions for the next step.  It seems to me that stress testing would be reasonable in this setting in order to rule out graft failure. I did refill his Eliquis for the next three months as well.

## 2019-09-30 ENCOUNTER — AMBULATORY - HEALTHEAST (OUTPATIENT)
Dept: CARDIAC REHAB | Facility: CLINIC | Age: 74
End: 2019-09-30

## 2019-09-30 DIAGNOSIS — Z95.1 S/P CABG (CORONARY ARTERY BYPASS GRAFT): ICD-10-CM

## 2019-10-02 ENCOUNTER — AMBULATORY - HEALTHEAST (OUTPATIENT)
Dept: CARDIAC REHAB | Facility: CLINIC | Age: 74
End: 2019-10-02

## 2019-10-02 DIAGNOSIS — Z95.1 S/P CABG (CORONARY ARTERY BYPASS GRAFT): ICD-10-CM

## 2019-10-09 ENCOUNTER — AMBULATORY - HEALTHEAST (OUTPATIENT)
Dept: PHARMACY | Facility: CLINIC | Age: 74
End: 2019-10-09

## 2019-10-09 ENCOUNTER — AMBULATORY - HEALTHEAST (OUTPATIENT)
Dept: CARDIAC REHAB | Facility: CLINIC | Age: 74
End: 2019-10-09

## 2019-10-09 ENCOUNTER — TRANSFERRED RECORDS (OUTPATIENT)
Dept: HEALTH INFORMATION MANAGEMENT | Facility: CLINIC | Age: 74
End: 2019-10-09

## 2019-10-09 DIAGNOSIS — K21.9 GASTROESOPHAGEAL REFLUX DISEASE, ESOPHAGITIS PRESENCE NOT SPECIFIED: ICD-10-CM

## 2019-10-09 DIAGNOSIS — I25.810 CORONARY ARTERY DISEASE INVOLVING CORONARY BYPASS GRAFT OF NATIVE HEART WITHOUT ANGINA PECTORIS: ICD-10-CM

## 2019-10-09 DIAGNOSIS — Z95.1 S/P CABG (CORONARY ARTERY BYPASS GRAFT): ICD-10-CM

## 2019-10-09 DIAGNOSIS — Z71.89 ENCOUNTER FOR HERB AND VITAMIN SUPPLEMENT MANAGEMENT: ICD-10-CM

## 2019-10-09 DIAGNOSIS — I48.91 ATRIAL FIBRILLATION, UNSPECIFIED TYPE (H): ICD-10-CM

## 2019-10-10 ENCOUNTER — HOSPITAL ENCOUNTER (OUTPATIENT)
Dept: CT IMAGING | Facility: CLINIC | Age: 74
Discharge: HOME | End: 2019-10-10
Attending: INTERNAL MEDICINE

## 2019-10-10 DIAGNOSIS — Z98.890 STATUS POST LIGATION OF LEFT ATRIAL APPENDAGE: ICD-10-CM

## 2019-10-10 DIAGNOSIS — I48.0 PAROXYSMAL ATRIAL FIBRILLATION (H): ICD-10-CM

## 2019-10-10 LAB
CREAT BLD-MCNC: 1.1 MG/DL (ref 0.7–1.3)
GFR SERPL CREATININE-BSD FRML MDRD: >60 ML/MIN/1.73M2

## 2019-10-11 DIAGNOSIS — F41.9 ANXIETY DUE TO INVASIVE PROCEDURE: ICD-10-CM

## 2019-10-11 RX ORDER — DIAZEPAM 5 MG
TABLET ORAL
Qty: 1 TABLET | Refills: 0 | Status: SHIPPED | OUTPATIENT
Start: 2019-10-11 | End: 2020-02-13

## 2019-10-11 NOTE — TELEPHONE ENCOUNTER
[facility] phone call message- patient requesting a refill:    Full Medication Name: diazepam    Dose: 5 MG    Pharmacy confirmed as   [facility] [place] [facility] [address]  [address]  [place] [address]  Phone: [phone] Fax: [phone]  : Yes    Additional Comments: THE PT NEEDS IT FOR HIS PROCEDURE HE IS HAVING      OK to leave a message on voice mail? Yes    Primary language: English      needed? No    Call taken on October 11, 2019 at 8:36 AM by Jalil Arias

## 2019-10-14 ENCOUNTER — AMBULATORY - HEALTHEAST (OUTPATIENT)
Dept: CARDIAC REHAB | Facility: CLINIC | Age: 74
End: 2019-10-14

## 2019-10-14 DIAGNOSIS — Z95.1 S/P CABG (CORONARY ARTERY BYPASS GRAFT): ICD-10-CM

## 2019-10-15 ENCOUNTER — HOSPITAL ENCOUNTER (OUTPATIENT)
Dept: CT IMAGING | Facility: CLINIC | Age: 74
Discharge: HOME | End: 2019-10-15
Attending: INTERNAL MEDICINE

## 2019-10-15 ASSESSMENT — MIFFLIN-ST. JEOR: SCORE: 1801.27

## 2019-10-16 ENCOUNTER — TRANSFERRED RECORDS (OUTPATIENT)
Dept: HEALTH INFORMATION MANAGEMENT | Facility: CLINIC | Age: 74
End: 2019-10-16

## 2019-10-16 ENCOUNTER — AMBULATORY - HEALTHEAST (OUTPATIENT)
Dept: CARDIAC REHAB | Facility: CLINIC | Age: 74
End: 2019-10-16

## 2019-10-16 DIAGNOSIS — Z95.1 S/P CABG (CORONARY ARTERY BYPASS GRAFT): ICD-10-CM

## 2019-10-21 ENCOUNTER — COMMUNICATION - HEALTHEAST (OUTPATIENT)
Dept: CARDIOLOGY | Facility: CLINIC | Age: 74
End: 2019-10-21

## 2019-10-30 ENCOUNTER — TELEPHONE (OUTPATIENT)
Dept: FAMILY MEDICINE | Facility: CLINIC | Age: 74
End: 2019-10-30

## 2019-10-30 DIAGNOSIS — K14.0 SUBLINGUAL INFECTION: ICD-10-CM

## 2019-10-30 LAB
BSA FOR ECHO PROCEDURE: 2.3 M2
CCTA EJECTION FRACTION ESTIMATED: 55 %

## 2019-10-30 NOTE — TELEPHONE ENCOUNTER
[facility] phone call message- general phone call:    Reason for call: Pt would like Dr Ponce to call him and let him know how is goes about quitting his medication - apixaban ANTICOAGULANT (ELIQUIS) 5 MG tablet       Return call needed: Yes    OK to leave a message on voice mail? Yes    Primary language: English      needed? No    Call taken on October 30, 2019 at 4:09 PM by Jefry Yuen

## 2019-11-01 NOTE — TELEPHONE ENCOUNTER
Pt is calling back because he thinks he got an infection again. Pt states every time he uses the mouth piece for his sleep apnea he gets the infection. Pt would like to be prescribed the medication for his infection before the weekend.

## 2019-11-03 RX ORDER — AMOXICILLIN 500 MG/1
1000 CAPSULE ORAL 2 TIMES DAILY
Qty: 28 CAPSULE | Refills: 0 | Status: SHIPPED | OUTPATIENT
Start: 2019-11-03 | End: 2020-02-13

## 2019-11-05 NOTE — TELEPHONE ENCOUNTER
I called pt today.  Mouth is better--didn't fill abtx.  He wants to play softball and does not want to take eliquis or have a procedure to coil his JUDY, and is willing to accept the 4% annual risk of stroke.  He will take asa 81 mg daily at night.  He takes ibuprofen 600 mg during the day on 'softball game days'.   He is on a PPI, which may reduce ulcer risk.  The decision is clearly his, and is in line with his priorities.

## 2019-11-18 ENCOUNTER — COMMUNICATION - HEALTHEAST (OUTPATIENT)
Dept: CARDIOLOGY | Facility: CLINIC | Age: 74
End: 2019-11-18

## 2019-11-18 DIAGNOSIS — Z95.1 S/P CABG (CORONARY ARTERY BYPASS GRAFT): ICD-10-CM

## 2020-02-06 ENCOUNTER — TELEPHONE (OUTPATIENT)
Dept: FAMILY MEDICINE | Facility: CLINIC | Age: 75
End: 2020-02-06

## 2020-02-06 NOTE — TELEPHONE ENCOUNTER
"Patient reports \"chest tightness\" states he has had this tightness for more than one month. Tightness across his chest that does not radiate, does not cause SOB, and no other symptoms accompany the tightness. Pt reports the tightness is constantly present. Pt states he does not have NTG at home and he has not contacted his cardiologist, he wants to discuss this with  first. Patient states he has history of adhesions and this may be what he is experiencing. Patient encouraged to seek medical attention if tightness changes, he has SOB, radiating pain or any other concerning symptoms. Patient is scheduled to be seen 2/13/20.    Note routed to Dr. Kris Humphries RN  "

## 2020-02-06 NOTE — TELEPHONE ENCOUNTER
[facility] phone call message-patient reporting a symptom:     Symptom: Pt was calling in to make an appt with Dr Ponce for chest pain.  I made him an appt but wanted him to speak with the nurse before hanging up. Transferred call to [facility].    Same Day Visit Offered: Yes, declined    Additional comments: none    OK to leave message on voice mail? Yes    Primary language: English      needed? No    Call taken on February 6, 2020 at 11:00 AM by Jefry Yuen

## 2020-02-13 ENCOUNTER — OFFICE VISIT (OUTPATIENT)
Dept: FAMILY MEDICINE | Facility: CLINIC | Age: 75
End: 2020-02-13
Payer: COMMERCIAL

## 2020-02-13 VITALS
DIASTOLIC BLOOD PRESSURE: 83 MMHG | RESPIRATION RATE: 16 BRPM | WEIGHT: 233 LBS | SYSTOLIC BLOOD PRESSURE: 167 MMHG | TEMPERATURE: 97.5 F | OXYGEN SATURATION: 98 % | BODY MASS INDEX: 32.04 KG/M2 | HEART RATE: 59 BPM

## 2020-02-13 DIAGNOSIS — R07.9 CHEST PAIN, UNSPECIFIED TYPE: Primary | ICD-10-CM

## 2020-02-13 DIAGNOSIS — I10 BENIGN ESSENTIAL HYPERTENSION: ICD-10-CM

## 2020-02-13 DIAGNOSIS — I48.91 ATRIAL FIBRILLATION, UNSPECIFIED TYPE (H): ICD-10-CM

## 2020-02-13 DIAGNOSIS — Z95.1 STATUS POST CORONARY ARTERY BYPASS GRAFT: ICD-10-CM

## 2020-02-13 LAB
BUN SERPL-MCNC: 20.3 MG/DL (ref 7–21)
CALCIUM SERPL-MCNC: 9.8 MG/DL (ref 8.5–10.1)
CHLORIDE SERPLBLD-SCNC: 104.7 MMOL/L (ref 98–110)
CHOLEST SERPL-MCNC: 112.1 MG/DL (ref 0–200)
CHOLEST/HDLC SERPL: 2.4 {RATIO} (ref 0–5)
CO2 SERPL-SCNC: 23.6 MMOL/L (ref 20–32)
CREAT SERPL-MCNC: 0.9 MG/DL (ref 0.7–1.3)
GFR SERPL CREATININE-BSD FRML MDRD: 82.3 ML/MIN/1.7 M2
GLUCOSE SERPL-MCNC: 112.2 MG'DL (ref 70–99)
HDLC SERPL-MCNC: 46.3 MG/DL
LDLC SERPL CALC-MCNC: 55 MG/DL (ref 0–129)
POTASSIUM SERPL-SCNC: 3.8 MMOL/L (ref 3.2–4.6)
SODIUM SERPL-SCNC: 139 MMOL/L (ref 132–142)
TRIGL SERPL-MCNC: 51.3 MG/DL (ref 0–150)
VLDL CHOLESTEROL: 10.3 MG/DL (ref 7–32)

## 2020-02-13 RX ORDER — CHLORTHALIDONE 25 MG/1
25 TABLET ORAL AT BEDTIME
Qty: 30 TABLET | Refills: 1 | Status: SHIPPED | OUTPATIENT
Start: 2020-02-13 | End: 2020-02-27

## 2020-02-13 NOTE — PROGRESS NOTES
Patient Active Problem List    Diagnosis Date Noted     Status post coronary artery bypass graft 05/01/2019     Priority: Medium     3 vessel CABG:  SULTANA to LAD,  Separate reverse saph to vein to LAD branch and right PDA  Ligation of atrial appendage in context of a fib  Dr Ashley Bai        History of colonic polyps 02/10/2017     Priority: Medium     Colonoscopy 1/27/2017 by [facility], Dr. Conroy, showing   Cecum:  One 2mm sessile polyp, removed  Transverse colon:  Three 3-7mm sessile polyps, removed  Sigmoid colon:  One 2mm sessile polyp, removed  Rectum:  One 4mm polyp, removed  Diverticulosis, internal hemorrhoids.         BPH (benign prostatic hyperplasia) 04/14/2015     Priority: Medium     Neg prostate bx 2009.         Impaired fasting glucose 01/28/2014     Priority: Medium     Atrial fibrillation (H) 03/20/2013     Priority: Medium       5/2019:  Ligation of atrial appendage in context of a fib.  Needs eliquis temporarily.         Basal cell carcinoma of skin 03/20/2013     Priority: Medium     Basal Cell Carcinoma Of The Skin 2009 (173.9); left cheek. excised by Dr Hernandez @ [facility]         Esophageal reflux 03/20/2013     Priority: Medium     Esophageal Reflux (530.81); benign endoscopy on EGD 6/22/04         Hyperlipidemia 03/20/2013     Priority: Medium     Tier 1  Diagnosis updated by automated process. Provider to review and confirm.       Health Care Home 03/20/2013     Priority: Medium     Tier 1  DX V65.8 REPLACED WITH [id number] HEALTH CARE HOME (04/08/2013)       Obstructive sleep apnea 03/20/2013     Priority: Medium     Obstructive Sleep Apnea (327.23); intolerant of cpap         Spermatocele 03/20/2013     Priority: Medium     Spermatocele (608.1); left testicle confirmed by ultrasound.          Abnormal abdominal CT scan 04/14/2015     Priority: Low     Inguinal hernias noted--planning repair fall 2015  Diverticulosis  Aortic and coronary atherosclerosis  bph       There are no exam notes on  file for this visit.  Chief Complaint   Patient presents with     Chest Pain     chest pain      Blood pressure (!) 167/83, pulse 59, temperature 97.5  F (36.4  C), temperature source Oral, resp. rate 16, weight 105.7 kg (233 lb), SpO2 98 %.  No results found for any visits on 02/13/20.     SUBJECTIVE:  Mehrdad Schneider is here for followup of several issues.  First of all, he has ongoing chest pain.  It is an achy discomfort that occurs at rest primarily he can carry logs and play softball with running to first base and swing the bat without having any discomfort.  Of significance, he had a coronary bypass in 05/2019.  He feels like his wound is itchy and a little bit sore at times.  His pain is not associated with meals and not associated with orthopnea, PND or new dyspnea.  He is having a little bit more edema, but is not complaining of it.  He takes ibuprofen 1-3 times a day to help manage his arthralgias so that he can play softball and do the activities he wants.  He is off Eliquis and is now on aspirin daily.  He had coronary artery bypass with ligation of his left atrial appendage with investigation of the left atrial appendage in October.  It was noted that he had an occluded bypass grafts, but he had a small opening into his left atrial appendage.  The patient did not want to take Eliquis long-term because he wants to play softball, so he stopped that on his own.  He has followup with Dr. Vee in April.      He has a history of colon polyps and is wondering if he should have colonoscopy.  It was recommended he should get another colonoscopy in 3 years.  His blood pressures at home typically are in the 150s-160s in the past and now 130s-140s.      OBJECTIVE:   GENERAL:  Patient is alert, pleasant, and in no acute distress.   VITAL SIGNS:  Blood pressure is as noted.   CHEST:  Clear.   HEART:  Irregularly irregular.  Pulse about 60.  His incision is clean, dry and intact, but has sort of a keloidal appearance on the  inferior aspect of it.  He does have reproducible chest pain with palpation of his lower ribs.   ABDOMEN:  Soft and nontender.   EXTREMITIES:  1+ edema.      EKG today shows atrial fibrillation.  No ischemic changes, no Q-waves.      ASSESSMENT:   1. Chest wall pain.   2. Stable coronary artery disease.   3. Hypertension.   4. Osteoarthritis.   5. Edema, uncertain cause.     6. Perhaps sleep apnea.      PLAN:   1. Reassured that there is no objective evidence of any ischemia and he has no symptoms that would suggest that as well.   2. Stop ibuprofen and start chlorthalidone 25 mg daily to help get better control of his blood pressure.   3. Use acetaminophen 500 mg tablets 2 every 6 hours as needed for pain.   4. Check blood pressures at home and follow up in 2-3 weeks.   5. Check BMP and lipid profile today to assess renal function, given that we are starting a diuretic and check his lipid profile since it has not been done in a year.   6. Regarding his colon polyps, I do think his life expectancy is as such that getting a colonoscopy this spring makes sense.

## 2020-02-13 NOTE — PATIENT INSTRUCTIONS
Stop advil/ibuprofen    Use extra strength tylenol 500 mg tabs--you can take 2 every 6 hours as needed.  Max of 1000mg/dose and 3000 mg /day (that's 6 tabs max/day).    Keep checking your BP at home.    F/up in 2-3 weeks.    rev'd the CT from October--grafts looked open.

## 2020-02-13 NOTE — LETTER
February 14, 2020      Giacomo Schneider  [address] [address] [place] [id number]        Dear Mehrdad Gooden   Your labs look very good.  The target for your LDL at this point Is under 70, and you're at 55, so stay on the atorvastatin.   Your kidney tests and blood salts are normal.  Your blood sugar is slightly elevated, which is acceptable in a non-fasting sample.   See you in a few weeks to review your BP's.   C Fallert     Please see below for your test results.    Resulted Orders   Basic Metabolic Panel ([place])   Result Value Ref Range    Urea Nitrogen 20.3 7.0 - 21.0 mg/dL    Calcium 9.8 8.5 - 10.1 mg/dL    Chloride 104.7 98.0 - 110.0 mmol/L    Carbon Dioxide 23.6 20.0 - 32.0 mmol/L    Creatinine 0.9 0.7 - 1.3 mg/dL    Glucose 112.2 (H) 70.0 - 99.0 mg'dL    Potassium 3.8 3.2 - 4.6 mmol/L    Sodium 139.0 132.0 - 142.0 mmol/L    GFR Estimate 82.3 >60.0 mL/min/1.7 m2    GFR Estimate If Black >90 >60.0 mL/min/1.7 m2   Lipid Panel ([place])   Result Value Ref Range    Cholesterol 112.1 0.0 - 200.0 mg/dL    Cholesterol/HDL Ratio 2.4 0.0 - 5.0    HDL Cholesterol 46.3 >40.0 mg/dL    LDL Cholesterol Calculated 55 0 - 129 mg/dL    Triglycerides 51.3 0.0 - 150.0 mg/dL    VLDL Cholesterol 10.3 7.0 - 32.0 mg/dL       If you have any questions, please call the clinic to make an appointment.    Sincerely,    Giovani Ponce MD

## 2020-02-14 NOTE — RESULT ENCOUNTER NOTE
Please mail labs to patient with this message.    Mehrdad   Your labs look very good.  The target for your LDL at this point Is under 70, and you're at 55, so stay on the atorvastatin.  Your kidney tests and blood salts are normal.  Your blood sugar is slightly elevated, which is acceptable in a non-fasting sample.  See you in a few weeks to review your BP's.  C Fallert

## 2020-02-18 NOTE — RESULT ENCOUNTER NOTE
EKG Interpretation  Indication:Chest pain and CAD    Interpretation: atrial fibrillation, rate controlled.  No ischemic changes    Patient informed at visit.

## 2020-02-27 ENCOUNTER — OFFICE VISIT (OUTPATIENT)
Dept: FAMILY MEDICINE | Facility: CLINIC | Age: 75
End: 2020-02-27
Payer: COMMERCIAL

## 2020-02-27 VITALS
HEART RATE: 66 BPM | BODY MASS INDEX: 31.49 KG/M2 | WEIGHT: 229 LBS | TEMPERATURE: 97.9 F | SYSTOLIC BLOOD PRESSURE: 125 MMHG | DIASTOLIC BLOOD PRESSURE: 82 MMHG | RESPIRATION RATE: 16 BRPM | OXYGEN SATURATION: 99 %

## 2020-02-27 DIAGNOSIS — I10 BENIGN ESSENTIAL HYPERTENSION: ICD-10-CM

## 2020-02-27 DIAGNOSIS — E78.5 HYPERLIPIDEMIA: Primary | ICD-10-CM

## 2020-02-27 LAB
BUN SERPL-MCNC: 23.4 MG/DL (ref 7–21)
CALCIUM SERPL-MCNC: 9.8 MG/DL (ref 8.5–10.1)
CHLORIDE SERPLBLD-SCNC: 103.2 MMOL/L (ref 98–110)
CO2 SERPL-SCNC: 23.5 MMOL/L (ref 20–32)
CREAT SERPL-MCNC: 1.1 MG/DL (ref 0.7–1.3)
GFR SERPL CREATININE-BSD FRML MDRD: 68 ML/MIN/1.7 M2
GLUCOSE SERPL-MCNC: 103.9 MG'DL (ref 70–99)
POTASSIUM SERPL-SCNC: 3.5 MMOL/L (ref 3.2–4.6)
SODIUM SERPL-SCNC: 139.4 MMOL/L (ref 132–142)

## 2020-02-27 RX ORDER — CHLORTHALIDONE 25 MG/1
25 TABLET ORAL AT BEDTIME
Qty: 90 TABLET | Refills: 3 | Status: SHIPPED | OUTPATIENT
Start: 2020-02-27 | End: 2021-02-26

## 2020-02-27 NOTE — PROGRESS NOTES
SUBJECTIVE       Giacomo Schneider is a 74 year old  male with a PMH significant for:     Patient Active Problem List   Diagnosis     Atrial fibrillation (H)     Basal cell carcinoma of skin     Esophageal reflux     Hyperlipidemia     Health Care Home     Obstructive sleep apnea     Spermatocele     Impaired fasting glucose     Abnormal abdominal CT scan     BPH (benign prostatic hyperplasia)     History of colonic polyps     Status post coronary artery bypass graft     He presents for follow up on blood pressure. He reports that he has been taking his diuretic at bedtime, and has been taking his blood pressure daily in the AM. Rests for a couple of minutes prior to taking his reading but notes that he does not wait a full 5 mins. He brought in his readings - SBPs are mostly in the 130s and DBPs in the 90s. Three readings of SPB in the 140s but two of these were immediately following last visit when chlorthalidone was added. Does note that his machine is consistently about 5 mm hg above readings in clinics/PT appointments. No recent changes in dizziness or lightheadedness, particularly upon standing. No change in frequency of urination. Does have about two episodes of nocturia per night but this is unchanged from prior to starting on the diuretic. Thinks that leg swelling has gone down.     He reports that he is no longer taking the ibuprofen for general aches and pain. Now is taking tylenol instead.     PMH, Medications and Allergies were reviewed and updated as needed.        REVIEW OF SYSTEMS     General: no fatigue  Resp: occasional shortness of breath - at baseline  CV: chest pain (burning) unchanged from prior  GI: has been constipated but this recently resolved  : no urinary frequency   Ext: decreased LE edema        OBJECTIVE     Vitals:    02/27/20 0918   BP: 125/82   BP Location: Left arm   Pulse: 66   Resp: 16   Temp: 97.9  F (36.6  C)   TempSrc: Oral   SpO2: 99%   Weight: 103.9 kg (229 lb)      Body mass index is 31.49 kg/m .    Constitutional: Awake, alert, cooperative, no apparent distress, and appears stated age.   Lungs: No increased work of breathing, good air exchange, clear to auscultation bilaterally, no crackles or wheezing.   Cardiovascular: irregularly irregular, normal S1 and S2, no S3 or S4, and no murmur noted. Abdomen: BS normal, non-distended, non-tender.  Extremities: Trace LE edema bilaterally.   No results found for this or any previous visit (from the past 24 hour(s)).      ASSESSMENT AND PLAN     1. Benign Essential Hypertension   Patient will well controlled BP in clinic today, which correlates with AM home readings mostly in the 130s. Decreased LE edema on exam today. No noted side effects. BMP pending. Will continue on current dose of chlorthalidone 25 mg at bedtime. Encouraged him to bring in his BP cuff at the next visit to compare with office reading.   - Basic Metabolic Panel ([place])      RTC in 3 months for follow up or sooner if develops new or worsening symptoms.    Yonatan Malin, MS4     Preceptor Attestation:  I was present with the medical student who participated in the service and in the documentation of this note. I have verified the history and personally performed the physical exam and medical decision making. I have verified the content of the note, which accurately reflects my assessment of the patient and the plan of care.   Supervising Physician:  Giovani Ponce MD.

## 2020-02-27 NOTE — PATIENT INSTRUCTIONS
Thank you for coming in to clinic today! Please continue taking your blood pressure at home a couple of times per week. We will not make any changes to the current dose of your diuretic, and we will call you if there are any abnormalities with your lab results.

## 2020-02-27 NOTE — LETTER
February 28, 2020      Giacomo Schneider  [address] [address] [place] [id number]        Dear Giacomo,    Please see below for your test results.    Resulted Orders   Basic Metabolic Panel ([place])   Result Value Ref Range    Urea Nitrogen 23.4 (H) 7.0 - 21.0 mg/dL    Calcium 9.8 8.5 - 10.1 mg/dL    Chloride 103.2 98.0 - 110.0 mmol/L    Carbon Dioxide 23.5 20.0 - 32.0 mmol/L    Creatinine 1.1 0.7 - 1.3 mg/dL    Glucose 103.9 (H) 70.0 - 99.0 mg'dL    Potassium 3.5 3.2 - 4.6 mmol/L    Sodium 139.4 132.0 - 142.0 mmol/L    GFR Estimate 68.0 >60.0 mL/min/1.7 m2    GFR Estimate If Black 82.3 >60.0 mL/min/1.7 m2     Mehrdad  Your labs look fine--no problems with the kidneys or the blood salts on the new BP medication.  See you in 3 months.  PAOLA Ponce

## 2020-02-27 NOTE — RESULT ENCOUNTER NOTE
Please mail labs to patient with this message.    Mehrdad  Your labs look fine--no problems with the kidneys or the blood salts on the new BP medication.  See you in 3 months.  PAOLA Ponce

## 2020-05-06 ENCOUNTER — TELEPHONE (OUTPATIENT)
Dept: FAMILY MEDICINE | Facility: CLINIC | Age: 75
End: 2020-05-06

## 2020-05-06 NOTE — TELEPHONE ENCOUNTER
Reached out to patient during COVID19 Clinic outreach. Reassured patient that [facility] is still open and has started implementing phone and video appointments to help patient remain safe at home.     Patient reports the following concerns: None        Juanita Grossman, CMA

## 2020-05-15 ENCOUNTER — COMMUNICATION - HEALTHEAST (OUTPATIENT)
Dept: CARDIOLOGY | Facility: CLINIC | Age: 75
End: 2020-05-15

## 2020-05-15 DIAGNOSIS — I48.19 PERSISTENT ATRIAL FIBRILLATION (H): ICD-10-CM

## 2020-05-19 ENCOUNTER — TELEPHONE (OUTPATIENT)
Dept: FAMILY MEDICINE | Facility: CLINIC | Age: 75
End: 2020-05-19

## 2020-05-19 DIAGNOSIS — I48.20 CHRONIC ATRIAL FIBRILLATION (H): Primary | ICD-10-CM

## 2020-05-19 RX ORDER — METOPROLOL TARTRATE 50 MG
25 TABLET ORAL 2 TIMES DAILY
Qty: 45 TABLET | Refills: 3 | Status: SHIPPED | OUTPATIENT
Start: 2020-05-19 | End: 2021-09-22

## 2020-05-19 NOTE — TELEPHONE ENCOUNTER
[facility] phone call message- general phone call:    Reason for call: He needs his Metoprolol 50mg tablets sent to [facility] in [facility]    Action desired: call back.    Return call needed: Yes    OK to leave a message on voice mail? Yes    Advised patient to response may take up to 2 business days: Yes    Primary language: English      needed? No    Call taken on May 19, 2020 at 9:22 AM by Alessia Hearn

## 2020-05-21 ENCOUNTER — COMMUNICATION - HEALTHEAST (OUTPATIENT)
Dept: CARDIOLOGY | Facility: CLINIC | Age: 75
End: 2020-05-21

## 2020-05-21 DIAGNOSIS — Z95.1 S/P CABG (CORONARY ARTERY BYPASS GRAFT): ICD-10-CM

## 2020-06-08 ENCOUNTER — OFFICE VISIT - HEALTHEAST (OUTPATIENT)
Dept: CARDIOLOGY | Facility: CLINIC | Age: 75
End: 2020-06-08

## 2020-06-08 ENCOUNTER — TRANSFERRED RECORDS (OUTPATIENT)
Dept: HEALTH INFORMATION MANAGEMENT | Facility: CLINIC | Age: 75
End: 2020-06-08

## 2020-06-08 ENCOUNTER — VIRTUAL VISIT (OUTPATIENT)
Dept: FAMILY MEDICINE | Facility: CLINIC | Age: 75
End: 2020-06-08
Payer: COMMERCIAL

## 2020-06-08 VITALS
BODY MASS INDEX: 31.66 KG/M2 | DIASTOLIC BLOOD PRESSURE: 74 MMHG | TEMPERATURE: 96.4 F | SYSTOLIC BLOOD PRESSURE: 117 MMHG | WEIGHT: 230.2 LBS

## 2020-06-08 DIAGNOSIS — I48.0 PAROXYSMAL ATRIAL FIBRILLATION (H): ICD-10-CM

## 2020-06-08 DIAGNOSIS — I10 BENIGN ESSENTIAL HYPERTENSION: Primary | ICD-10-CM

## 2020-06-08 DIAGNOSIS — Z95.1 STATUS POST CORONARY ARTERY BYPASS GRAFT: ICD-10-CM

## 2020-06-08 DIAGNOSIS — I10 ESSENTIAL HYPERTENSION: ICD-10-CM

## 2020-06-08 DIAGNOSIS — I48.11 LONGSTANDING PERSISTENT ATRIAL FIBRILLATION (H): ICD-10-CM

## 2020-06-08 DIAGNOSIS — I25.810 CORONARY ARTERY DISEASE INVOLVING CORONARY BYPASS GRAFT OF NATIVE HEART WITHOUT ANGINA PECTORIS: ICD-10-CM

## 2020-06-08 DIAGNOSIS — E78.2 MIXED HYPERLIPIDEMIA: ICD-10-CM

## 2020-06-08 NOTE — PROGRESS NOTES
"Family Medicine Telephone Visit Note           Telephone Visit Consent   Patient was verbally read the following and verbal consent was obtained.    \"Telephone visits are billed at different rates depending on your insurance coverage. During this emergency period, for some insurers they may be billed the same as an in-person visit.  Please reach out to your insurance provider with any questions.  If during the course of the call the physician/provider feels a telephone visit is not appropriate, you will not be charged for this service.\"    Name person giving consent:  Patient   Date verbal consent given:  6/8/2020  Time verbal consent given:  9:12 AM           Chief Complaint   Patient presents with     RECHECK     Followup eye exam                   HPI   Patients name: Giacomo  Appointment start time:  9:33 AM    Not playing SB, but is chopping wood.  Frustrated by weight, some.  Can work outside for 1 hour.    Still having some chest pains, but it's better.  Feels like it's around scar.  A tape in that area.  Breathing ok.  Walks and able to go up stairs.  Has been off cigars since Feb.    BP @ home is well controlled--see below  6  /74.  Pulses are generally 70s.  Generally 110-120/70-80s since May 6.    Temp is generally 96-97.        Current Outpatient Medications   Medication Sig Dispense Refill     aspirin (ASA) 81 MG EC tablet Take 81 mg by mouth At Bedtime        atorvastatin (LIPITOR) 80 MG tablet Take 80 mg by mouth daily       chlorthalidone (HYGROTON) 25 MG tablet Take 1 tablet (25 mg) by mouth At Bedtime 90 tablet 3     metoprolol tartrate (LOPRESSOR) 50 MG tablet Take 0.5 tablets (25 mg) by mouth 2 times daily 45 tablet 3     omeprazole (PRILOSEC) 20 MG DR capsule Take 1 capsule (20 mg) by mouth 2 times daily 180 capsule 3     Allergies   Allergen Reactions     Budesonide      Sulfa Drugs      Thimerosal      Patient Active Problem List   Diagnosis Code     Atrial fibrillation (H) I48.91     " "Basal cell carcinoma of skin C44.91     Esophageal reflux K21.9     Hyperlipidemia E78.5     Health Care Home Z76.89     Obstructive sleep apnea G47.33     Spermatocele N43.40     Impaired fasting glucose R73.01     Abnormal abdominal CT scan R93.5     BPH (benign prostatic hyperplasia) N40.0     History of colonic polyps Z86.010     Status post coronary artery bypass graft Z95.1            Review of Systems:     Not dizzy, LH, edema is better.           Physical Exam:     /74   Temp 96.4  F (35.8  C)   Wt 104.4 kg (230 lb 3.2 oz)   BMI 31.66 kg/m    Estimated body mass index is 31.66 kg/m  as calculated from the following:    Height as of 4/12/18: 1.816 m (5' 11.5\").    Weight as of this encounter: 104.4 kg (230 lb 3.2 oz).    Exam:  Constitutional: healthy, alert and no distress  Psychiatric: mentation appears normal and affect normal/bright    Results from last visit:  Office Visit on 02/27/2020   Component Date Value Ref Range Status     Urea Nitrogen 02/27/2020 23.4* 7.0 - 21.0 mg/dL Final     Calcium 02/27/2020 9.8  8.5 - 10.1 mg/dL Final     Chloride 02/27/2020 103.2  98.0 - 110.0 mmol/L Final     Carbon Dioxide 02/27/2020 23.5  20.0 - 32.0 mmol/L Final     Creatinine 02/27/2020 1.1  0.7 - 1.3 mg/dL Final     Glucose 02/27/2020 103.9* 70.0 - 99.0 mg'dL Final     Potassium 02/27/2020 3.5  3.2 - 4.6 mmol/L Final     Sodium 02/27/2020 139.4  132.0 - 142.0 mmol/L Final     GFR Estimate 02/27/2020 68.0  >60.0 mL/min/1.7 m2 Final     GFR Estimate If Black 02/27/2020 82.3  >60.0 mL/min/1.7 m2 Final           Assessment and Plan   1. Benign essential hypertension  Controlled on chlorthal at bedtime and toprol  - Basic Metabolic Panel ([facility]); Future    2. Longstanding persistent atrial fibrillation  Had ligation of atrial appendate--on asa only    3. Status post coronary artery bypass graft  Stable. Chest wall pain better    4. Coronary artery disease involving coronary bypass graft of native heart " without angina pectoris  Stable.  No angina.  BP/lipids/smoking controlled.        Refilled medications that would be required in the next 3 months.     After Visit Information:  Patient declined AVS     No follow-ups on file.    Appointment end time: 9:50 AM  This is a telephone visit that took 17 minutes.      Clinician location:  Vivian Ponce MD  I precepted today with .

## 2020-06-09 DIAGNOSIS — I10 BENIGN ESSENTIAL HYPERTENSION: ICD-10-CM

## 2020-06-09 LAB
BUN SERPL-MCNC: 23.8 MG/DL (ref 7–21)
CALCIUM SERPL-MCNC: 9.8 MG/DL (ref 8.5–10.1)
CHLORIDE SERPLBLD-SCNC: 101.8 MMOL/L (ref 98–110)
CO2 SERPL-SCNC: 29.5 MMOL/L (ref 20–32)
CREAT SERPL-MCNC: 1 MG/DL (ref 0.7–1.3)
GFR SERPL CREATININE-BSD FRML MDRD: 74.2 ML/MIN/1.7 M2
GLUCOSE SERPL-MCNC: 90.3 MG'DL (ref 70–99)
POTASSIUM SERPL-SCNC: 3.9 MMOL/L (ref 3.2–4.6)
SODIUM SERPL-SCNC: 138.9 MMOL/L (ref 132–142)

## 2020-08-17 ENCOUNTER — AMBULATORY - HEALTHEAST (OUTPATIENT)
Dept: CARDIOLOGY | Facility: CLINIC | Age: 75
End: 2020-08-17

## 2020-08-17 DIAGNOSIS — I48.19 PERSISTENT ATRIAL FIBRILLATION (H): ICD-10-CM

## 2020-08-20 ENCOUNTER — COMMUNICATION - HEALTHEAST (OUTPATIENT)
Dept: CARDIOLOGY | Facility: CLINIC | Age: 75
End: 2020-08-20

## 2020-08-20 DIAGNOSIS — Z95.1 S/P CABG (CORONARY ARTERY BYPASS GRAFT): ICD-10-CM

## 2020-09-04 ENCOUNTER — TELEPHONE (OUTPATIENT)
Dept: FAMILY MEDICINE | Facility: CLINIC | Age: 75
End: 2020-09-04

## 2020-09-04 NOTE — TELEPHONE ENCOUNTER
Patient reporting blood in semen. Denies blood in urine, denies pain or any discomfort. Patient prefers to speak with PCP and was transferred to call center to schedule appt. Patient encouraged to call clinic if symptoms change or worsen. Patient agreeable.    Note routed to Dr.Fallert Humphries RN

## 2020-09-04 NOTE — TELEPHONE ENCOUNTER
[facility] Family Medicine phone call message-patient reporting a symptom:     Symptom:     Pt states he has blood in his sperm. He's wanting a call back from Dr. Ponce.         Same Day Visit Offered:  No    Additional comments:     Call back    OK to leave message on voice mail? Yes    Primary language: English      needed? No    Call taken on September 4, 2020 at 9:02 AM by Miladis Chi CMA

## 2020-09-04 NOTE — TELEPHONE ENCOUNTER
Please call pt  1.  I think this is something he should come in for, as the evaluation usually required some labs (blood and urine).  2.  Let him know that it's usually a benign problem, but we should see him to make sure.  PAOLA Ponce

## 2020-09-04 NOTE — TELEPHONE ENCOUNTER
Patient given the information below. Voices understanding and will come to clinic for his appt. Patient prefers to see his PCP and is satisfied with this date and time. Patient encouraged to call clinic if symptoms change or if he has any concerns..    BTRN

## 2020-09-21 ENCOUNTER — OFFICE VISIT (OUTPATIENT)
Dept: FAMILY MEDICINE | Facility: CLINIC | Age: 75
End: 2020-09-21
Payer: COMMERCIAL

## 2020-09-21 VITALS
HEART RATE: 79 BPM | TEMPERATURE: 98.2 F | SYSTOLIC BLOOD PRESSURE: 143 MMHG | OXYGEN SATURATION: 98 % | RESPIRATION RATE: 16 BRPM | DIASTOLIC BLOOD PRESSURE: 81 MMHG

## 2020-09-21 DIAGNOSIS — Z23 NEED FOR PROPHYLACTIC VACCINATION AND INOCULATION AGAINST INFLUENZA: ICD-10-CM

## 2020-09-21 DIAGNOSIS — R36.1 HEMATOSPERMIA: Primary | ICD-10-CM

## 2020-09-21 LAB
BILIRUBIN UR: NEGATIVE MG/DL
BLOOD UR: NEGATIVE MG/DL
GLUCOSE URINE: NEGATIVE
KETONES UR QL: NEGATIVE MG/DL
LEUKOCYTE ESTERASE UR: NEGATIVE
NITRITE UR QL STRIP: NEGATIVE MG/DL
PH UR STRIP: 5.5 [PH] (ref 4.5–8)
PROTEIN UR: NEGATIVE MG/DL
SP GR UR STRIP: 1.02 (ref 1–1.03)
UROBILINOGEN UR STRIP-ACNC: NORMAL E.U./DL

## 2020-09-21 NOTE — LETTER
September 24, 2020      Giacomo Schneider  [id number] [address] [place] [id number]        Dear ,    We are writing to inform you of your test results.    Your urine test was completely normal, and your PSA was low risk.   I do not think the blood that you saw was a sign of anything serious, thankfully.   Your use of aspirin, which I still recommend for you, perhaps made it more likely.   Let me know if you have any questions or have any more problems.   Thanks for your trust, and for your willingness to work with our students.   Sincerely,   Saurabh Ponce MD     Resulted Orders   Urinalysis, Micro If ([facility])   Result Value Ref Range    Specific Gravity Urine 1.020 1.005 - 1.030    pH Urine 5.5 4.5 - 8.0    Leukocyte Esterase UR Negative NEGATIVE    Nitrite Urine Negative NEGATIVE mg/dL    Protein UR Negative NEGATIVE mg/dL    Glucose Urine Negative NEGATIVE    Ketones Urine Negative NEGATIVE mg/dL    Urobilinogen mg/dL 0.2 E.U./dL 0.2 E.U./dL E.U./dL    Bilirubin UR Negative NEGATIVE mg/dL    Blood UR Negative NEGATIVE mg/dL   PSA Diagnostic ([facility])   Result Value Ref Range    PSA 1.7 ng/mL    Narrative    Test performed by:  [facility] [address] [place] [address]       If you have any questions or concerns, please call the clinic at the number listed above.       Sincerely,        Giovani Ponce MD

## 2020-09-21 NOTE — PROGRESS NOTES
SUBJECTIVE       Giacomo Schneider is a 75 year old  male with a PMH significant for:     Patient Active Problem List   Diagnosis     Atrial fibrillation (H)     Basal cell carcinoma of skin     Esophageal reflux     Hyperlipidemia     Health Care Home     Obstructive sleep apnea     Spermatocele     Impaired fasting glucose     Abnormal abdominal CT scan     BPH (benign prostatic hyperplasia)     History of colonic polyps     Status post coronary artery bypass graft     He presents for chief complaint of hematospermia. One month ago he noticed some blood in his sperrn, which he described as rust colored. He noted that this happened once before, about a year ago following his triple bypass surgery. He denies blood in urine, dysuria, and changes in urinary frequency. He denies hematochezia or dyschezia. He note no changes in diet, exercise habits, or medication usage. He denies fever, nausea. Vomiting, or diarrhea.     PMH, Medications and Allergies were reviewed and updated as needed.        REVIEW OF SYSTEMS     See Above        OBJECTIVE     Vitals:    09/21/20 1637 09/21/20 1638   BP: (!) 144/97 (!) 143/81   BP Location: Left arm Left arm   Patient Position: Sitting Sitting   Cuff Size: Adult Regular Adult Regular   Pulse: 79    Resp: 16    Temp: 98.2  F (36.8  C)    TempSrc: Oral    SpO2: 98%      There is no height or weight on file to calculate BMI.    Constitutional: Awake, alert, cooperative, no apparent distress, and appears stated age.  Genitourinary: normal. Both testes are smooth without palpable masses. Prostate is mildly enlarged, without any hard masses or tenderness on palpation.  Neuropsychiatric: Normal affect, mood, orientation, memory and insight.    Results for orders placed or performed in visit on 09/21/20   Urinalysis, Micro If ([facility])     Status: None   Result Value Ref Range    Specific Gravity Urine 1.020 1.005 - 1.030    pH Urine 5.5 4.5 - 8.0    Leukocyte Esterase UR Negative  NEGATIVE    Nitrite Urine Negative NEGATIVE mg/dL    Protein UR Negative NEGATIVE mg/dL    Glucose Urine Negative NEGATIVE    Ketones Urine Negative NEGATIVE mg/dL    Urobilinogen mg/dL 0.2 E.U./dL 0.2 E.U./dL E.U./dL    Bilirubin UR Negative NEGATIVE mg/dL    Blood UR Negative NEGATIVE mg/dL       ASSESSMENT AND PLAN     1. Blood in semen  Mr Schneider is a 75yoM who presents with blood in his sperm roughly one month ago.  Differnetial diagnosis would include sporadic hematospermia, infection, or malignancy of the testes or prostate. No fever, tenderness on palpation, dysuria/dyschezia suggest against an infection. Normal  exam suggest against cancer. This has happened a single time one year ago and does not have any other notable pattern, thus making sporadic hematospermia the most likely cause. Urinalysis ordered to look for any hematuria. Pt advised to return if the symptoms persist or worsen.  - Urinalysis, Micro If ([facility])    RTC if pt develops new or worsening symptoms.  Fritz Levy, MS3    Preceptor Attestation:   I was present with the medical student who participated in the service and in the documentation of this note. I have verified the history and personally performed the physical exam and medical decision making. I have verified the content of the note, which accurately reflects my assessment of the patient and the plan of care.  PSA ordered to r/o prostate cancer, which is unlikely on exam.  Pt's aspirin may be contributing to his complaint.  If UA and PSA are normal, will observe.    Pt also brings in home BPs which are in the 120-130/70-80s.  Reassured that home readings are excellent.   Supervising Physician:  Giovani Ponce MD.

## 2020-09-24 NOTE — RESULT ENCOUNTER NOTE
Mehrdad  Your urine test was completely normal, and your PSA was low risk.  I do not think the blood that you saw was a sign of anything serious, thankfully.  Your use of aspirin, which I still recommend for you, perhaps made it more likely.  Let me know if you have any questions or have any more problems.  Thanks for your trust, and for your willingness to work with our students.  Sincerely,  Saurabh Ponce MD

## 2020-09-26 DIAGNOSIS — K21.9 GASTROESOPHAGEAL REFLUX DISEASE WITHOUT ESOPHAGITIS: ICD-10-CM

## 2020-10-05 LAB — PSA SERPL-MCNC: 1.7 NG/ML (ref 0–6.5)

## 2020-12-17 DIAGNOSIS — K21.9 GASTROESOPHAGEAL REFLUX DISEASE WITHOUT ESOPHAGITIS: ICD-10-CM

## 2021-01-20 ENCOUNTER — OFFICE VISIT - HEALTHEAST (OUTPATIENT)
Dept: CARDIOLOGY | Facility: CLINIC | Age: 76
End: 2021-01-20

## 2021-01-20 ENCOUNTER — TRANSFERRED RECORDS (OUTPATIENT)
Dept: HEALTH INFORMATION MANAGEMENT | Facility: CLINIC | Age: 76
End: 2021-01-20

## 2021-01-20 DIAGNOSIS — E78.2 MIXED HYPERLIPIDEMIA: ICD-10-CM

## 2021-01-20 DIAGNOSIS — I10 ESSENTIAL HYPERTENSION: ICD-10-CM

## 2021-01-20 DIAGNOSIS — I25.810 CORONARY ARTERY DISEASE INVOLVING CORONARY BYPASS GRAFT OF NATIVE HEART WITHOUT ANGINA PECTORIS: ICD-10-CM

## 2021-01-20 DIAGNOSIS — I48.19 PERSISTENT ATRIAL FIBRILLATION (H): ICD-10-CM

## 2021-01-20 ASSESSMENT — MIFFLIN-ST. JEOR: SCORE: 1810.35

## 2021-01-22 ENCOUNTER — TELEPHONE (OUTPATIENT)
Dept: FAMILY MEDICINE | Facility: CLINIC | Age: 76
End: 2021-01-22

## 2021-01-22 NOTE — TELEPHONE ENCOUNTER
[facility] Family Medicine phone call message- general phone call:    Reason for call: the pt called to ask for the Dr to call him back to talk about the covid vaccine     Return call needed: Yes    OK to leave a message on voice mail? Yes    Primary language: English      needed? No    Call taken on January 22, 2021 at 8:49 AM by Jalil Arias

## 2021-01-30 ENCOUNTER — IMMUNIZATION (OUTPATIENT)
Dept: NURSING | Facility: CLINIC | Age: 76
End: 2021-01-30
Payer: COMMERCIAL

## 2021-01-30 PROCEDURE — 0001A PR COVID VAC PFIZER DIL RECON 30 MCG/0.3 ML IM: CPT

## 2021-01-30 PROCEDURE — 91300 PR COVID VAC PFIZER DIL RECON 30 MCG/0.3 ML IM: CPT

## 2021-02-10 ENCOUNTER — HOSPITAL ENCOUNTER (OUTPATIENT)
Dept: CT IMAGING | Facility: CLINIC | Age: 76
Discharge: HOME | End: 2021-02-10
Attending: INTERNAL MEDICINE

## 2021-02-10 DIAGNOSIS — I48.19 PERSISTENT ATRIAL FIBRILLATION (H): ICD-10-CM

## 2021-02-10 LAB
BSA FOR ECHO PROCEDURE: 0 M2
CREAT BLD-MCNC: 1.4 MG/DL (ref 0.7–1.3)
GFR SERPL CREATININE-BSD FRML MDRD: 49 ML/MIN/1.73M2

## 2021-02-20 ENCOUNTER — IMMUNIZATION (OUTPATIENT)
Dept: NURSING | Facility: CLINIC | Age: 76
End: 2021-02-20
Attending: INTERNAL MEDICINE
Payer: COMMERCIAL

## 2021-02-20 PROCEDURE — 0002A PR COVID VAC PFIZER DIL RECON 30 MCG/0.3 ML IM: CPT

## 2021-02-20 PROCEDURE — 91300 PR COVID VAC PFIZER DIL RECON 30 MCG/0.3 ML IM: CPT

## 2021-02-26 DIAGNOSIS — I10 BENIGN ESSENTIAL HYPERTENSION: ICD-10-CM

## 2021-02-26 RX ORDER — CHLORTHALIDONE 25 MG/1
TABLET ORAL
Qty: 90 TABLET | Refills: 0 | Status: SHIPPED | OUTPATIENT
Start: 2021-02-26 | End: 2021-02-26

## 2021-02-26 RX ORDER — CHLORTHALIDONE 25 MG/1
25 TABLET ORAL AT BEDTIME
Qty: 90 TABLET | Refills: 3 | Status: SHIPPED | OUTPATIENT
Start: 2021-02-26 | End: 2022-02-11

## 2021-03-18 DIAGNOSIS — K21.9 GASTROESOPHAGEAL REFLUX DISEASE WITHOUT ESOPHAGITIS: ICD-10-CM

## 2021-05-26 ENCOUNTER — RECORDS - HEALTHEAST (OUTPATIENT)
Dept: ADMINISTRATIVE | Facility: CLINIC | Age: 76
End: 2021-05-26

## 2021-05-27 NOTE — PROGRESS NOTES
Thank you Dr. Ponce for asking the [facility] team to see Giacomo Schneider Sr. in consultation  to evaluate abnormal stress test.      Assessment/Plan:   Abnormal stress test and coronary disease as evidenced by coronary calcification seen on CT scans. Mehrdad gives me no symptoms of angina and the stress test does not show a large territory of myocardium at risk. I do not feel that an invasive angiogram is indicated currently given his lack of symptoms. We will try a coronary CTA to see if we can rule out severe proximal coronary disease and will otherwise try to manage conservatively. I am switching his zocor to atorvastatin. He will continue his daily aspirin. Mehrdad will monitor for symptoms and call if he notes any.    Regarding his embolic risk from his afib, we again reviewed his CHADSvasc score of 3. He is willing to rediscuss his options with Dr. Vee.    F/U Dr. Vee in 3-4 weeks     Current History:   Giacomo Schneider Sr. is a 73 y.o. with history of coronary calcification seen on a non-cardiac CT scan and chronic atrial fibrillation who has refused anticoagulation in the past. Mehrdad follows with Dr. Vee. He comes to [facility] today for evaluation after having an abnormal stress test. Mehrdad is an active helena, chopping wood, doing yard work and playing in a senior softball league. He notes no chest pain and only some chronic mild dyspnea with a flight of stairs or after an hour of chopping wood. Towards the end of January he developed upper abdominal pain after getting up out of a chair. It persisted but has recently started to improve. Mehrdad says that when he is busy and physically active that he notices the pain less. There is no chest pain or discomfort, no change in his chronic mild dyspnea, and no palpitations, syncope, pnd/orthopnea, or edema.    Past Medical History:     Past Medical History:   Diagnosis Date     Atrial fibrillation (H)      BPH (benign prostatic hyperplasia)      Cancer (H)     basal cell  carcinoma of skin     Coronary atherosclerosis      Diverticulosis      GERD (gastroesophageal reflux disease)      H/O cardiovascular stress test     negative 2012     Hyperlipemia      Impaired fasting glucose      Sleep apnea     unable to tolerate CPAP     Spermatocele        Past Surgical History:     Past Surgical History:   Procedure Laterality Date     LAPAROSCOPIC INGUINAL HERNIA REPAIR Bilateral 12/3/2015    Procedure: LAPAROSCOPIC BILATERAL INGUINAL HERNIA REPAIR WITH BILATERAL MESH;  Surgeon: Bran Puckett MD;  Location: [facility] [place];  Service:        Family History:     Family History   Problem Relation Age of Onset     Hypertension Father      No Medical Problems Mother      No Medical Problems Sister      No Medical Problems Brother      No Medical Problems Maternal Aunt      No Medical Problems Maternal Uncle      No Medical Problems Paternal Aunt      No Medical Problems Paternal Uncle      No Medical Problems Maternal Grandmother      No Medical Problems Maternal Grandfather      No Medical Problems Paternal Grandmother      No Medical Problems Paternal Grandfather      Anesthesia problems Neg Hx      Cancer Neg Hx      Clotting disorder Neg Hx      Diabetes Neg Hx      Dislocations Neg Hx      Osteoporosis Neg Hx      Psoriasis Neg Hx      Rashes / Skin problems Neg Hx      Severe sprains Neg Hx      Ulcerative colitis Neg Hx        Social History:    reports that he has been smoking cigars.  His smokeless tobacco use includes chew. He reports that he drinks alcohol. He reports that he does not use drugs.    Meds:     Current Outpatient Medications   Medication Sig     amoxicillin (AMOXIL) 500 MG capsule Take 1,000 mg by mouth 2 (two) times a day.     aspirin 325 MG tablet Take 325 mg by mouth daily.     betamethasone, augmented, (DIPROLENE-AF) 0.05 % cream      hydrocortisone valerate (WEST-SAVANAH) 0.2 % ointment Apply 1 application topically as needed.      ketoconazole (NIZORAL) 2 %  "cream Apply a thin layer to affected area(s) twice daily as needed     metoprolol tartrate (LOPRESSOR) 50 MG tablet Take 1 tablet (50 mg total) by mouth 2 (two) times a day.     omeprazole (PRILOSEC) 20 MG capsule Take 20 mg by mouth 2 (two) times a day.      simvastatin (ZOCOR) 40 MG tablet Take 1 tablet (40 mg total) by mouth bedtime.     VENTOLIN HFA 90 mcg/actuation inhaler Inhale 1 puff as needed.      FLUZONE HIGH-DOSE 2016-17, PF, 180 mcg/0.5 mL Syrg injection TO BE ADMINISTERED BY PHARMACIST FOR IMMUNIZATION       Allergies:   Budesonide; Sulfa (sulfonamide antibiotics); and Thimerosal    Review of Systems:   Review of Systems:   General: WNL  Eyes: WNL  Ears/Nose/Throat: WNL  Lungs: Snoring  Heart: Shortness of Breath with activity, Irregular Heartbeat  Stomach: WNL  Bladder: WNL  Muscle/Joints: WNL  Skin: WNL  Nervous System: WNL  Mental Health: WNL     Blood: WNL       Objective:      Physical Exam  @LASTENCWT:3@  6' 0.01\" (1.829 m)  @BMI:3@  /70 (Patient Site: Left Arm, Patient Position: Sitting, Cuff Size: Adult Large)   Pulse (!) 56   Resp 20   Ht 6' 0.01\" (1.829 m)   Wt (!) 232 lb 11.2 oz (105.6 kg)   BMI 31.55 kg/m      General Appearance:   Alert, cooperative and in no acute distress.   HEENT:  No scleral icterus; the mucous membranes were pink and moist.   Neck: JVP flat. No thyromegaly. No HJR   Chest: The spine was straight. The chest was symmetric.   Lungs:   Respirations unlabored; the lungs are clear to auscultation.   Cardiovascular:   S1 and S2 normal and without murmur. No clicks or rubs. No carotid bruits noted. Right DP, PT, and radial pulses 2+. Left DP, PT, and radial pulses 2+.   Abdomen:  No organomegaly, masses, bruits, or tenderness. Bowels sounds are present   Extremities: No cyanosis, clubbing, or edema.   Skin: No xanthelasma.   Neurologic: Mood and affect are appropriate.         Lab Review   Lab Results   Component Value Date     01/23/2015    K 4.0 01/23/2015 "     (H) 01/23/2015    CO2 21 (L) 01/23/2015    BUN 22 01/23/2015    CREATININE 1.14 01/23/2015    CALCIUM 9.8 01/23/2015     No results found for: WBC, HGB, HCT, MCV, PLT  No results found for: CHOL, TRIG, HDL, LDL, LDLDIRECT  No results found for: BNP      Paulina Patel M.D.

## 2021-05-27 NOTE — PATIENT INSTRUCTIONS - HE
- CT scan to look for blockages in the heart    - Switch zocor (simvastatin) to atorvastastin    - Stay on your aspirin    - Talk with Dr. Vee about blood thinner vs a left atrial appendage occluder in 3-4 weeks

## 2021-05-28 ENCOUNTER — RECORDS - HEALTHEAST (OUTPATIENT)
Dept: ADMINISTRATIVE | Facility: CLINIC | Age: 76
End: 2021-05-28

## 2021-05-28 NOTE — PROGRESS NOTES
"Spiritual Care Note    Spiritual Assessment:   made a post surgery visit with patient this morning; sons are present. Patient recovering from open heart surgery. Patient seems flat as he shares, \"not being sure how he is doing because I've never been through this before.\" Patient seems to have the support he needs from family and appears comforted by their support. No concerns noted.     Care Provided: Listening and support provided.     Plan of Care: Spiritual care will continue to follow as part of patient's care team.    QING Preston, BCC    "

## 2021-05-28 NOTE — TREATMENT PLAN
RCAT Treatment Plan      Patient Score: 13  Patient Acutiy: 3    Clinical Indication for Therapy: atelectasis    Therapy Ordered: flutter valve and IS four times a day per RCAT fifi    Assessment Summary: pt pulls 1200ml on IS, tolerates flutter valve therapy fairly well. Cough is weak and non-productive. Will re-eval in 48 hours or as needed.          05/01/19 2031   Reason for Assessment (RCAT)   RCAT Assesment Initial   Assessment Reason  Cardiac surgery   $ RCAT Eval Time 30 min.  Yes   Chart Assessment (RCAT)   Pulmonary Status  3   Surgical Status  3   Chest Xray  2   Patient Assessment (RCAT)    Respiratory Pattern  0   Mental Status  0   Breath Sounds  0   Cough Effectiveness  2   Level of Activity  2   O2 Required SpO2 >= 92%  1   Chart + Pt. Assessment Total Points  13   RCAT Acuity Score 13 (Acuity 3)   Clinical Indications (RCAT)   Broncho-Pulmonary Therapy Pt. unable to deep breath and cough spontaneously   Volume Expansion Therapy Atelectasis   RCAT Order Placed  Yes         DMITRY ZaldivarT

## 2021-05-28 NOTE — PROGRESS NOTES
Pt refused to get up and into the chair this AM, stating he was comfortable and wanted to try and get some more sleep. He is aggreable to get up when his breakfast comes.

## 2021-05-28 NOTE — PROGRESS NOTES
Respiratory Therapy Note:  Titrated off O2.  -1000 mL.  Able to use Aerobika.  Plan is to continue IS/Aerobika per post cardiac surgery protocol.  Ange Rojo, 5/2/2019

## 2021-05-28 NOTE — ANESTHESIA PROCEDURE NOTES
Central line    Start time: 5/1/2019 7:15 AM  End time: 5/1/2019 7:20 AM  Patient location: OR Post-induction  Indications: central pressure monitoring and vascular access  Performing Anesthesiologist: Ramana West MD  Pre-procedure Checklist  Completed: patient identified, site marked, risks, benefits, and alternatives discussed, timeout performed, consent obtained, hand hygiene performed, all elements of maximal sterile barriers used including cap, mask, gown, sterile gloves, and large sheet and skin prep agent completely dried prior to procedure    Procedure Details:  Lidocaine 1% local anesthesia used for skin prep  Preparation: 2% chlorhexidine  Location details: right internal jugular  Catheter type: Introducer with [place]  Introducer type: Cordis  Lumens:double lumenNumber of attempts: 1  Ultrasound evaluation of access site: yes  Transduced for venous waveform  Post-procedure:   line sutured and Antimicrobial disks with CHG applied  Assessment: blood return through all ports and free fluid flow  Complications: none

## 2021-05-28 NOTE — PROGRESS NOTES
Pt discharge to home in stable condition.  Wife was present.  Appointment is scheduled for tomorrow to meet with CV surgery team.

## 2021-05-28 NOTE — PLAN OF CARE
Problem: Pain  Goal: Patient's pain/discomfort is manageable  Outcome: Adequate for Discharge     Problem: Safety  Goal: Patient will be injury free during hospitalization  Outcome: Adequate for Discharge     Problem: Daily Care  Goal: Daily care needs are met  Outcome: Adequate for Discharge     Problem: Psychosocial Needs  Goal: Demonstrates ability to cope with hospitalization/illness  Outcome: Adequate for Discharge  Goal: Collaborate with patient/family/caregiver to identify patient specific goals for this hospitalization  Outcome: Adequate for Discharge     Problem: Discharge Barriers  Goal: Patient's discharge needs are met  Outcome: Adequate for Discharge     Problem: Knowledge Deficit  Goal: Patient/family/caregiver demonstrates understanding of disease process, treatment plan, medications, and discharge instructions  Outcome: Adequate for Discharge     Problem: Potential for Falls  Goal: Patient will remain free of falls  Outcome: Adequate for Discharge     Problem: Potential for Compromised Skin Integrity  Goal: Skin integrity is maintained or improved  Outcome: Adequate for Discharge  Goal: Nutritional status is improving  Outcome: Adequate for Discharge     Problem: Urinary Incontinence  Goal: Perineal skin integrity is maintained or improved  Outcome: Adequate for Discharge     Problem: POD 1  Goal: Patient/Family/Caregiver demonstrates understanding of DVT and DVT treatment measures  Outcome: Adequate for Discharge  Goal: Patients pain/discomfort controlled to allow patient to get out of bed  Outcome: Adequate for Discharge  Goal: POD 1 Mobility/activity advancement  Outcome: Adequate for Discharge  Goal: Patients nutritional intake is adequate and patient will participate in bowel management program  Outcome: Adequate for Discharge  Goal: POD 1 patient will understand meds  Outcome: Adequate for Discharge     Problem: Glucose Imbalance  Goal: Achieve optimal glucose control  Outcome: Adequate for  Discharge     Problem: Ineffective Airway Clearance  Goal: Maintain airway patency  Outcome: Adequate for Discharge     Problem: Breathing  Goal: Patient will utilize incentive spirometer  Outcome: Adequate for Discharge    Alert and oriented X 4.  Pt reports no pain.  97% on RA.  Controlled afib in 80s.  BG good all day, no insulin needed.  1500 on IS.  SBA, walks well.  Pt uses heart pillow appropriately.  Set to discharge.

## 2021-05-28 NOTE — PROGRESS NOTES
ICU UPDATE:  Mr. Schneider was noted to be doing well on his PST and following commands with RSI<100 and good vital signs. He was successfully extubated.    Maria Guadalupe Trejoqvi  Pulmonary and Critical Care  [id number]

## 2021-05-28 NOTE — PLAN OF CARE
Problem: Pain  Goal: Patient's pain/discomfort is manageable  Outcome: Progressing     Problem: Safety  Goal: Patient will be injury free during hospitalization  Outcome: Progressing     Problem: Psychosocial Needs  Goal: Demonstrates ability to cope with hospitalization/illness  Outcome: Progressing     Problem: Potential for Falls  Goal: Patient will remain free of falls  Outcome: Progressing   Pt alert and oriented, with intermittent confusion and anxiety noted (Pt forgets limitations, hurt his back trying to flush toilet, also thought he was urinating blood-no blood in urine). A-fib on tele, rate 80s. LS dim on room air, IS up to 950. C/o incisional pain/back pain, well regulated with scheduled tylenol and PRN tramadol. Assist of 1, unsteady. Chest incision cdi, dermbond, healing well. Graft site cdi, dermabond. Pt has hemorrhoids, spotting of blood with BM. Pt has had 3 BMs overnight. VSS. Will continue to monitor.  Robson Pozo RN  5:47 AM  05/05/19  Vitals:    05/05/19 0404   BP: 116/69   Pulse: 87   Resp: 22   Temp: 98.1  F (36.7  C)   SpO2: 98%

## 2021-05-28 NOTE — PLAN OF CARE
Problem: Pain  Goal: Patient's pain/discomfort is manageable  Outcome: Progressing     Problem: Safety  Goal: Patient will be injury free during hospitalization  Outcome: Progressing     Pts pain has been managed with scheduled tylenol and PRN oxycodone. He has been calling approprietly for his needs. No issues noted with chest tube or duque overnight. Minimal output from chest tube. He continues in Afib with rate 70-80, Amiodarone running per orders.

## 2021-05-28 NOTE — DISCHARGE SUMMARY
Cardiovascular Surgery Discharge Summary    Primary Care Physician:  Giovani Ponce MD  Discharge Provider: Ingris Lyn   Admission Date: 5/1/2019  Admission Diagnoses: CAD (coronary artery disease) [I25.10]  Discharge Date: 5/6/2019  Disposition: Home  Condition at Discharge: Good  Code Status: Full Code     Principal Diagnosis:   S/P CABG (coronary artery bypass graft)x3    Discharge Diagnoses:    Principal Problem:    S/P CABG (coronary artery bypass graft)  Active Problems:      Patient Active Problem List   Diagnosis     Mixed hyperlipidemia     Atrial fibrillation (H)     Adult Sleep Apnea     Esophageal Reflux     Bilateral inguinal hernia     Essential hypertension     FONSECA (dyspnea on exertion)     Abnormal cardiovascular stress test     Abnormal computed tomography angiography (CTA)     Elevated coronary artery calcium score     S/P CABG (coronary artery bypass graft)     ARF (acute respiratory failure) (H)     Anemia due to blood loss, acute         Consult/s: Dietary, critical care medicine    Surgery:   05/01/2019 with Dr. Bai  1.  Epiaortic ultrasound of the ascending aorta.  2.  Ligation of the left atrial appendage.  3.  Triple vessel coronary artery bypass grafting procedures; left internal mammary  artery to left anterior descending coronary artery and separate reversed saphenous  vein grafts to the medial branch of the left anterior descending diagonal branch and  the right posterior descending coronary arteries.  4.  Endoscopic vein procurement from the left lower extremity.      Discharge Medications:      Medication List      START taking these medications    acetaminophen 325 MG tablet  Commonly known as:  TYLENOL  Take 2 tablets (650 mg total) by mouth every 4 (four) hours as needed.     amiodarone 200 MG tablet  Commonly known as:  PACERONE  Take 1 tablet (200 mg total) by mouth 2 (two) times a day.     apixaban 5 mg Tab tablet  Commonly known as:  ELIQUIS  Take 1 tablet (5 mg  total) by mouth 2 (two) times a day.     bisacodyl 5 mg EC tablet  Commonly known as:  DULCOLAX  Take 2 tablets (10 mg total) by mouth daily as needed for constipation.     QUEtiapine 50 MG tablet  Commonly known as:  SEROquel  Take 1 tablet (50 mg total) by mouth at bedtime as needed.     traMADol 50 mg tablet  Commonly known as:  ULTRAM  Take 0.5-1 tablets (25-50 mg total) by mouth every 6 (six) hours as needed.        CHANGE how you take these medications    atorvastatin 80 MG tablet  Commonly known as:  LIPITOR  Take 1 tablet (80 mg total) by mouth at bedtime.  What changed:      medication strength    how much to take        CONTINUE taking these medications    ASCORBIC ACID-MULTIVIT-MIN ORAL  Take 1 tablet by mouth daily.     aspirin 81 MG EC tablet  Take 81 mg by mouth at bedtime.     betamethasone (augmented) 0.05 % cream  Commonly known as:  DIPROLENE-AF  Apply 1 application topically as needed.      CALCIUM 600 + D(3) 600 mg(1,500mg) -400 unit per tablet  Generic drug:  calcium carbonate-vitamin D3  Take 1 tablet by mouth daily.     carboxymethylcellulose 0.5 % Dpet ophthalmic dropperette  Commonly known as:  REFRESH PLUS  Administer 1 drop to both eyes at bedtime.     FLUZONE HIGH-DOSE 2016-17 (PF) 180 mcg/0.5 mL Syrg injection  Generic drug:  influenza vacc high dose (age 65 or >) (PF) 2016-17  TO BE ADMINISTERED BY PHARMACIST FOR IMMUNIZATION     hydrocortisone valerate 0.2 % ointment  Commonly known as:  WEST-SAVANAH  Apply 1 application topically as needed (uses on hands and ankle).      ketoconazole 2 % cream  Commonly known as:  NIZORAL  Apply 1 application topically as needed. Apply a thin layer to affected area(s) twice daily as needed  For athletes foot     MEGARED OMEGA-3 KRILL -875-09-64 mg Cap  Generic drug:  krill-om-3-dha-epa-phospho-ast  Take 1 capsule by mouth daily.     metoprolol tartrate 50 MG tablet  Commonly known as:  LOPRESSOR  Take 1 tablet (50 mg total) by mouth 2 (two) times  a day.     metroNIDAZOLE 0.75 % cream  Commonly known as:  METROCREAM  Apply 1 application topically 2 (two) times a day. To sore on nose     multivitamin therapeutic tablet  Take 1 tablet by mouth daily.     OSTEO BI-FLEX 250-200 mg Tab  Generic drug:  glucosamine-chondroitin  Take 2 tablets by mouth daily.      PriLOSEC 20 MG capsule  Generic drug:  omeprazole  Take 20 mg by mouth 2 (two) times a day.      CECY-E 400 mg Tab  Generic drug:  s-adenosylmethionine  Take 1 tablet by mouth daily.     VENTOLIN HFA 90 mcg/actuation inhaler  Generic drug:  albuterol  Inhale 1 puff as needed.     vitamin E 400 unit capsule  Take 400 Units by mouth daily.        STOP taking these medications    doxycycline 100 MG capsule  Commonly known as:  VIBRAMYCIN            Discharge Instructions:    Follow up appointment with Primary Care Physician: Giovani Ponce MD within 7 days of discharge.  Follow up appointment with Specialist:    Follow with CV Surgery as scheduled.   Follow-up with cardiology as scheduled   Follow-up with atrial fibrillation provider--someone will call to set this appointment up.    Diet: Cardiac    Activity/Restrictions: As tolerated with sternal precautions in mind (see below). No driving for 4 weeks or while on pain medication.     - Shower and wash your incisions daily with soap and water. No tub baths/hot tubs for 4 weeks. An antibacterial soap such as Dial or Safeguard is recommended.    - Check your incisions every day. If you notice any redness, drainage, or anything unusual, please call the surgeons office.    - No driving for 4 weeks after surgery or while on pain medication     - Do not lift anything more than 10 pounds for 6 weeks after surgery. After 6 weeks, advance lifting is tolerated.    - You may have watery drainage from your chest tube site for 2-3 weeks after surgery. Your may cover with a Band-Aid to protect your clothing. Remove the Band-Aid every day and wash the site.    - If  "you have a leg lesion, you may have swelling for 2-3 months. Elevate your leg any time you are not walking.    - If you feel any \"popping\" or \"clicking\" sensations in your chest, your arms are out too far or you are putting too much weight into arm movements. Do not reach over your head or out to the side to pull something. Do not do any arm exercises or use any exercise equipment that involves arm movement. If you feel your sternum moving, call the surgeon's office.    - Increase your daily activity as explained by Cardiac Rehab. You are encouraged to enroll in an Outpatient Cardiac Rehab Program.    - No active sports using your upper arms for 3 months. This includes fishing, hunting, bowling, swimming, tennis or golf.    - No physical activity such as cutting the grass, raking, vacuuming, changing sheets on your bed, snow shoveling, or using a  for 3 months.    - Use incentive spirometer 6-8 times per day for 2 weeks.       Hospital Summary:   Giacomo Schneider Sr. is a 74 y.o. male who was admitted to [facility] on 5/1/2019 following an abnormal coronary angiogram demonstrating severe multi-vessel coronary artery disease. He was referred to CV surgery for evaluation for possible coronary artery revascularization.     Patient was deemed a candidate for coronary artery bypass surgery, and was taken to the operating room on 5/1/2019 where patient underwent three vessel coronary artery bypass and endoscopic vein harvest from the left leg. He also underwent left atrial appendage ligation due to atrial fibrillation. Surgery was uneventful and patient was brought to the ICU post-operatively. He was extubated on POD#0 and weaned from pressors. Patient was awake and alert, afebrile, and with stable vitals. Insulin drip was discontinued and he was transitioned to a sliding scale. He was transferred to the general telemetry floor on POD#1 where patient has had return of bowel function, is maintaining " oxygen saturations on room air, had his chest tubes removed, and has no complaints of chest pain or shortness of breath. On 05/06/19, patient was stable enough to be discharged to home.    Of note, patient has chronic a-fib and did have a-fib with and without RVR post-op. He is on amiodarone and Eliquis at discharge and will have appropriate follow-up setup.     He was also discharged with Seroquel for PRN at bedtime use for anxiety/inability to sleep which helped in the hospital.     Vital Signs in last 24 hours:    Temp:  [97.5  F (36.4  C)-98.3  F (36.8  C)] 97.8  F (36.6  C)  Heart Rate:  [77-89] 85  Resp:  [16-18] 18  BP: ()/(65-80) 97/65  SpO2:  [96 %-99 %] 96 %    Physical Exam:    Pertinent exam findings on day of discharge include:  Gen: Seen up in chair. NAD. Pleasant and conversant.   CV: A-fib on monitor. No edema.  Pulm: Non-labored breathing on room air.  Abd: Soft, non-tender, non-distended  Neuro: CNs grossly intact  Inc: C/D/I    _______  Ingris Lyn PA-C  Cardiothoracic Surgery  [phone]

## 2021-05-28 NOTE — PLAN OF CARE
Problem: Pain  Goal: Patient's pain/discomfort is manageable  Outcome: Progressing   Sternal and back discomfort.    Problem: Discharge Barriers  Goal: Patient's discharge needs are met  Outcome: Progressing   POD1, chest tubes and wires in, lines out, drips off.    Problem: Knowledge Deficit  Goal: Patient/family/caregiver demonstrates understanding of disease process, treatment plan, medications, and discharge instructions  Outcome: Progressing   No concern.    Problem: Potential for Compromised Skin Integrity  Goal: Nutritional status is improving  Outcome: Progressing   Tolerating clears well.    Problem: Urinary Incontinence  Goal: Perineal skin integrity is maintained or improved  Outcome: Progressing   Borges, IV Lasix ordered.      Problem: POD 1  Goal: Patient/Family/Caregiver demonstrates understanding of DVT and DVT treatment measures  Outcome: Progressing   SCD's on.    Problem: POD 1  Goal: POD 1 Mobility/activity advancement  Outcome: Progressing   Moving well.

## 2021-05-28 NOTE — ANESTHESIA PROCEDURE NOTES
NATALIO    Start time: 5/1/2019 7:28 AM  Staffing:  Performing  Anesthesiologist: Ramnaa West MD  NATALIO:  Type/Reason: Monitoring NATALIO  Technique: blind insertion  Difficulty: easy  Anesthesia Monitoring: see additional note

## 2021-05-28 NOTE — H&P (VIEW-ONLY)
DATE OF SERVICE: 04/23/2019    REFERRING PHYSICIAN AND REASON FOR CONSULTATION:  Asked to evaluate this patient for  coronary artery bypass grafting by Dr. Paulina Patel.    HISTORY OF PRESENT ILLNESS:  Mr. Schneider is a 73-year-old gentleman with history of  coronary calcification.  This was seen on a noncardiac CT scan.  He has chronic  atrial fibrillation and refuses anticoagulation other than a large aspirin.  He is  followed by Dr. Vee.  He had an abnormal stress test and subsequent coronary  angiography revealed coronary artery disease.  He is active.  He chops wood; he does  yard work; and he plays in a senior softball league.  He has not had chest pain, but  does have chronic mild dyspnea when he walks up a flight of stairs or after an hour  of chopping wood.  He did have an episode of upper abdominal pain after getting out  of a chair in January.    PAST SURGICAL AND MEDICAL HISTORY  SURGICAL PROCEDURES:  Laparoscopic inguinal hernia repair in 2015.    MEDICAL PROBLEMS:  1.  Atrial fibrillation.  2.  BPH.  3.  History of basal cell carcinoma of the skin.  4.  Coronary artery disease.  5.  Diverticulosis.  6.  GERD.  7.  Hyperlipidemia.  8.  Impaired fasting glucose.  9.  Sleep apnea.  10.  Spermatocele.    ALLERGIES:  BUDESONIDE - UNKNOWN, SULFA - UNKNOWN, THIMEROSAL - UNKNOWN.      CURRENT MEDICATIONS:  See chart.    FAMILY HISTORY:  Essentially negative other than hypertension in his father.    SOCIAL HISTORY:  He is retired.  He is very active, chopping wood and playing  softball.  He smokes cigars.  He used to use smokeless tobacco, including chew, and  he does drink alcohol.    REVIEW OF SYSTEMS:  CONSTITUTIONAL:  No recent weight loss or decrease in activity.  HEENT:  Unremarkable.  RESPIRATORY:  Positive for snoring.  Heart shortness of breath with activity and  atrial fibrillation.  GASTROINTESTINAL:  Positive for GERD and diverticulosis.  GENITOURINARY:  Positive for some frequent  urination.  NEUROLOGIC:  No symptoms of stroke or TIA.  MUSCULOSKELETAL:  Noncontributory.    PHYSICAL EXAMINATION:  APPEARANCE:  Stocky elderly gentleman in no acute distress.  Height is 5 feet 1  inch, weight is 106 kilograms.  VITAL SIGNS:  Temperature afebrile, respiratory rate 20, heart rate 60, blood  pressure 130/70.  SKIN:  Scattered senile changes.  LYMPH NODES:  No palpable lymphadenopathy.  HEENT:  Normocephalic.  PERRL.  EOMs intact.  ENT unremarkable.  NECK:  Supple, without carotid bruits.  CHEST:  Without deformity.  LUNGS:  Clear to auscultation.  HEART:  Irregularly irregular without murmur, gallops or rubs.  Pulses 2+ and  symmetrical.  ABDOMEN:  Soft, nontender, without palpable organomegaly, normoactive bowel sounds.  GENITOURINARY AND RECTAL:  Deferred.  NEUROLOGIC:  Grossly intact.  BACK:  Without deformity.  EXTREMITIES:  Full range of motion without clubbing, cyanosis, edema.    LABORATORY:  Remarkable for a creatinine of 1.14.    ASSESSMENT:  This gentleman has severe double vessel coronary artery disease as well  as atrial fibrillation.  He does not want to take Coumadin.  I believe he would  benefit from coronary artery bypass grafting.  Targets include the left anterior  descending, a diagonal, and his right coronary system.  For conduit, we would use  the left internal mammary artery and reverse saphenous vein graft.  Given the  patient's history of atrial fibrillation, we will certainly place an AtriClip on his  left atrial appendage.  He understands that the risks for these 2 procedures include  bleeding, infection, stroke, sternal dehiscence, myocardial infarction, arrhythmias,  the need for a pacemaker, prolonged ventilation, pneumonia liver/renal failure,  aortic dissection, and an operative mortality of 2%.  He accepts these risks and is  agreeable with the plan of undergoing surgery on 05/01.      GHASSAN ROACH MD  ln  D 04/24/2019 14:14:12  T 04/24/2019 15:01:20  R 04/24/2019  15:01:20  [id number]        cc:SOPHIE ROACH MD

## 2021-05-28 NOTE — PROGRESS NOTES
Respiratory Care Note    Pt extubated per MD to 3L via NC, sats 98%, BBS clear/diminished. No post-extubation stridor noted, pt is not in respiratory distress. Will draw post-extubation ABG within one hour. Vent removed from room.     Sloan Ricardo, LRT

## 2021-05-28 NOTE — PROGRESS NOTES
Pharmacy Note - Admission Medication History  Pertinent Provider Information:   ______________________________________________________________________  Prior To Admission (PTA) med list completed and updated in EMR.     PTA Med List   Medication Sig Note Last Dose     ASCORBIC ACID-MULTIVIT-MIN ORAL Take 1 tablet by mouth daily.  4/25/2019 at Unknown time     aspirin 81 MG EC tablet Take 81 mg by mouth at bedtime.  4/30/2019 at 2200     atorvastatin (LIPITOR) 40 MG tablet Take 1 tablet (40 mg total) by mouth at bedtime.  4/30/2019 at 2200     betamethasone, augmented, (DIPROLENE-AF) 0.05 % cream Apply 1 application topically as needed.         4/30/2019 at Unknown time     calcium carbonate-vitamin D3 (CALCIUM 600 + D,3,) 600 mg(1,500mg) -400 unit per tablet Take 1 tablet by mouth daily.  4/21/2019 at Unknown time     carboxymethylcellulose (REFRESH PLUS) 0.5 % Dpet ophthalmic dropperette Administer 1 drop to both eyes at bedtime.  4/30/2019 at 2200     doxycycline (VIBRAMYCIN) 100 MG capsule Take 100 mg by mouth 2 (two) times a day. 4/30/2019: Picked up prescription on 4/27/19- for sore on nose 30 day supply written for  4/30/2019 at 2330     glucosamine-chondroitin (OSTEO BI-FLEX) 250-200 mg Tab Take 2 tablets by mouth daily.         4/25/2019 at Unknown time     hydrocortisone valerate (WEST-SAVANAH) 0.2 % ointment Apply 1 application topically as needed (uses on hands and ankle).         4/30/2019 at am     ketoconazole (NIZORAL) 2 % cream Apply 1 application topically as needed. Apply a thin layer to affected area(s) twice daily as needed  For athletes foot        4/30/2019 at Unknown time     krill-om-3-dha-epa-phospho-ast (MEGARED OMEGA-3 KRILL OIL) 829-672-60-64 mg cap Take 1 capsule by mouth daily.  4/25/2019 at Unknown time     metoprolol tartrate (LOPRESSOR) 50 MG tablet Take 1 tablet (50 mg total) by mouth 2 (two) times a day.  5/1/2019 at 0415     metroNIDAZOLE (METROCREAM) 0.75 % cream Apply 1 application  topically 2 (two) times a day. To sore on nose  4/30/2019 at Unknown time     multivitamin therapeutic tablet Take 1 tablet by mouth daily.  4/25/2019 at Unknown time     omeprazole (PRILOSEC) 20 MG capsule Take 20 mg by mouth 2 (two) times a day.         4/30/2019 at 2300     s-adenosylmethionine (CECY-E) 400 mg Tab Take 1 tablet by mouth daily.  4/25/2019 at Unknown time     VENTOLIN HFA 90 mcg/actuation inhaler Inhale 1 puff as needed.   More than a month at Unknown time     vitamin E 400 unit capsule Take 400 Units by mouth daily.  4/25/2019 at Unknown time       Information source(s): Patient and Family member  Patient was asked about OTC/herbal products specifically.  PTA med list reflects this.  Based on the pharmacist s assessment, the PTA med list information appears reliable  Allergies were reviewed, assessed, and updated with the patient.    Medications currently not available for use during hospital stay. Family/Patient representative states they will bring creams & eye drops to [facility].   Thank you for the opportunity to participate in the care of this patient.    Neela Medina, PharmD     5/1/2019     6:27 AM

## 2021-05-28 NOTE — OP NOTE
DATE OF SERVICE: 05/01/2019    OPERATING AREA:  Room 2    TITLE OF PROCEDURE:  1.  Epiaortic ultrasound of the ascending aorta.  2.  Ligation of the left atrial appendage.  3.  Triple vessel coronary artery bypass grafting procedures; left internal mammary  artery to left anterior descending coronary artery and separate reversed saphenous  vein grafts to the medial branch of the left anterior descending diagonal branch and  the right posterior descending coronary arteries.  4.  Endoscopic vein procurement from the left lower extremity.    SURGEONS:  Attending surgeon Ashley Bai MD; resident surgeon CHARITY Javier.    FIRST ASSISTANT:  ST KYM Valentin.    ANESTHESIA:  General endotracheal.    SKIN PREP:  Betadine and DuraPrep.    INCISIONS:  Median sternotomy and a skip incision along the course of the greater  saphenous vein, left upper leg.    DRAINS:  One 32-Lithuanian Jackson mediastinal and 24-Lithuanian Edgar drain left chest.    CULTURE:  None.    SPECIMEN:  Left atrial appendage.    CLOSURE:  Routine.    PREOPERATIVE DIAGNOSES:  1.  Stable angina.  2.  Chronic atrial fibrillation.  3.  Double vessel coronary artery disease.  4.  Preserved left ventricular function.    POSTOPERATIVE DIAGNOSES:  1.  Stable angina.  2.  Chronic atrial fibrillation.  3.  Double vessel coronary artery disease.  4.  Preserved left ventricular function.    BRIEF HISTORY:  Mr. Schneider is a 74-year-old gentleman who has a known history of  coronary calcification.  He has had chronic atrial fibrillation and has not been  anticoagulated for that other than with a large aspirin.  He had an abnormal stress  test and subsequent coronary angiography revealed double vessel coronary artery  disease.  The above procedure was planned.    FINDINGS AT OPERATION:  The transesophageal echo probe revealed a possible clot in  the left atrial appendage and therefore it was felt prudent to ligate the left  atrial appendage first.  The  patient's atria were huge.  The left internal mammary  artery was a 2 mm conduit with excellent flow.  The reversed saphenous vein graft  measured 5 mm in diameter and was of good quality.  The right posterior descending  was a 2.5 mm target vessel, a good vessel for bypass grafting.  The medial branch of  the left anterior descending diagonal branch was a 2 mm target vessel and excellent  vessel for bypass grafting and the left anterior descending coronary artery in the  intraventricular groove was a 2 mm target vessel and excellent vessel for bypass  grafting.    PROCEDURE:  The patient arrived in the operating room and an arterial line was  placed.  Satisfactory general endotracheal anesthesia was induced.  An OG tube was  inserted followed by a transesophageal echo probe, a [place]-Cindy catheter and a Borges  catheter.  The patient's neck, chest, abdomen, both groins and lower extremities  were prepped and draped in a standard sterile fashion.  A complete median sternotomy  was made.  With the aid of the Rultract retractor, the left internal mammary artery  was taken down from the subclavian vein to the xiphoid process.  At the same time,  Roenl Pulido made an incision below the knee along the course of the greater saphenous  vein and approximately 7 cm of the vein was exposed circumferentially.  The  endoscope was then passed proximally along the course of the greater saphenous vein.   The vein was mobilized circumferentially, its branches were clipped or cauterized.   It was clipped proximally and distally and extracted.  It was then cannulated and  distended, its branches controlled with Ligaclips.  The leg wound was rendered  hemostatic and then closed in layers using running 2-0 and 3-0 Vicryl, as well as  Dermabond on the skin.    Heparin was administered.  The left internal mammary artery was prepared in the  usual fashion.  A Mackenzie retractor was inserted.  The pericardium was opened and  tented up on  pericardial sutures.  The aorta was  from the pulmonary  artery.  Epiaortic ultrasound of the ascending aorta was performed.  Pursestring  sutures were then placed in the ascending aorta and right atrium for cannulation.   When the ACT was appropriate, cannulation was performed and cardiopulmonary bypass  established.  The patient was allowed to drift.  The aorta was crossclamped and a  liter of cold blood cardioplegic solution was administered in an antegrade fashion.   A good arrest was achieved.  Cold topical saline and slush was applied to the heart  intermittently during the period of aortic crossclamp.  Attention was first turned  to the left atrial appendage.  This was amputated and sent to pathology as specimen.   The base of the left atrial appendage was then oversewn in 2 layers using pledgeted  4-0 Prolene in 2 spots.  Once this was accomplished, the patient received another  dose of cold blood cardioplegia in an antegrade fashion.  Next, attention was turned  to the right posterior descending coronary artery.  This vessel was dissected out  for a distance of 1 cm and then opened for a distance of 1 cm.  It was bypassed in  an end-to-side fashion using reversed saphenous vein graft and running 7-0 Prolene.   Upon completion of this 1st distal anastomosis, the vein graft was flushed with cold  blood cardioplegic solution and sized to the ascending aorta and an additional 300  mL of cold blood cardioplegic solution was administered in an antegrade fashion.   Next, attention was turned to the left anterior descending diagonal.  This vessel  bifurcated and its medial branch was larger.  This vessel was dissected out for a  distance of 1 cm and then opened for a distance of 1 cm.  It was bypassed in an  end-to-side fashion using reversed saphenous vein graft and running 7-0 Prolene.   Upon completion of this 2nd distal anastomosis, the vein graft was flushed with cold  blood cardioplegic solution and  sized to the ascending aorta and an additional 300  mL of cold blood cardioplegic solution was administered in an antegrade fashion.   Finally, attention was turned to the left anterior descending coronary artery in the  intraventricular groove.  This vessel was dissected out at the junction of its mid  and distal thirds for a distance of 1 cm and then opened for a distance of 1 cm.  A  pleural rent was created for passage of the left internal mammary artery and then  the 3rd and last distal anastomosis was performed between the left internal mammary  artery and the left anterior descending coronary artery in an end-to-side fashion  using running 7-0 Prolene.  As this 3rd distal anastomosis was being performed,  gentle warming was begun.  The grey occluder was released from the left internal  mammary artery and good flow was seen down the distribution of the left anterior  descending coronary artery.  The grey occluder was once more applied to the left  internal mammary artery and the patient received a final dose of cold blood  cardioplegia in an antegrade fashion.  It had been decided to perform the 2 proximal  aortosaphenous anastomoses with aortic crossclamp in place; therefore two 4 mm punch  aortotomies were created.  The 2 proximal aortosaphenous anastomoses were  constructed in an end-to-side fashion using running 6-0 Prolene.  The grey occluder  was released from the left internal mammary artery and a hot shot was delivered in  an antegrade fashion.  De-airing maneuvers were performed and the aortic crossclamp  was released.  The aortic crossclamp time was an hour and 19 minutes.  Ventricular  pacing wires were applied and the patient required defibrillation twice.  The  patient was then ventricularly paced at a rate of 90.  The left pleural space and  pericardial spaces were drained of any accumulated blood and gentle ventilation was  once more begun.  The proximal and distal anastomoses were examined and  found to be  hemostatic.  The patient was placed on low-dose epinephrine.  When he was warm the  heart was allowed to fill and eject and the patient  from cardiopulmonary  bypass without difficulty.  Total time on cardiopulmonary bypass was an hour and 32  minutes.  The patient was decannulated and the cannulation sites oversewn with 4-0  Prolene.  The retrograde cardioplegic catheter was removed and that site repaired  with two 4-0 Prolene sutures and the root vent was removed and that site repaired  with pledgeted 4-0 Prolene.  Protamine was administered to reverse the heparin.   Hemostasis was satisfactory.  The mediastinum was drained with a 32-Mongolian Penasco  chest tube and the left pleural space was drained with a 24-Mongolian Edgar.  The  sternum was then approximated with a combination of #6 stainless steel sternal wires  as well as double wires.  The sternotomy wound was irrigated with warm antibiotic  containing solution.  It was closed in 4 layers using 2 layers of running 0 Vicryl,  an additional layer of 2-0 Vicryl and a subcuticular stitch of 4-0 Monocryl.   Dermabond was applied to the skin.  The sponge, needle and instrument counts were  reported as correct.  The estimated blood loss was 400 mL.  Mr. Schneider was brought  to the intensive care unit in critical but stable condition.      GHASSAN ROACH MD  tn  D 05/01/2019 11:23:37  T 05/01/2019 14:47:06  R 05/01/2019 14:47:06  [id number]        cc: SOPHIE ROACH MD

## 2021-05-28 NOTE — CONSULTS
PULMONARY / CRITICAL CARE CONSULTATION NOTE      Consultation - Pulmonary/Critical Care Medicine  Giacomo Schneider Sr.,  1945, MRN [id number]  Date / Time of Admission:  2019  5:24 AM    Admitting Dx: CAD (coronary artery disease) [I25.10]    PCP: Giovani Ponce MD, [phone]  Consulting physician:  Ashley Bai MD   Code status:  Full Code       Extended Emergency Contact Information  Primary Emergency Contact: Portia Mccullough  Address: [address] [place] [address] [facility]  Home Phone: [phone]  Mobile Phone: [phone]  Relation: Spouse  Secondary Emergency Contact: [place] [address] [facility]  Mobile Phone: [phone]  Relation: Child       ID:  Giacomo Schneider Sr. is a 74 y.o. male with CARMITA(not toleratnt of CPAP),coronary calcification noted on CT scan, chronic Afib and has refused AC except ASA(plays softball) who had Cardiac angio that revealed CAD. He was admitted today for CABG x 3 and JUDY ligation by Dr Bai     ASSESSMENT   1. Acute respiratory failure due to GA for today's case.  To ICU on full vent support  2. CAD.  S/p CABG x 4 today by Dr Bai  3. Chronic afib.  S/p JUDY ligation today  4. Acute blood loss anemia.   ml  5. CARMITA.  Per chart, pt intolerant of CPAP     PLAN   Systems to Assess:     Pulmonary: Wean supplemental O2 as tolerated; goal O2 sat > 92%.  HOB > 30 degrees to limit aspiration risk.      Check ABG and adjust support as indicated; avoid acidotic state.     Check CXR    Once hemodynamically stable, plan to proceed to wake, wean, and extubate, per CVS pathway     Continuous EtCO2 while intubated    Cardiovascular: Cardiac monitoring.     SBP goal > 90 mmHg, MAP goal > 65 mmHg.      Goal CI 2-3     Goal PAD 18-20    Vasoactive gtts per CV-surgery.     Temporary pacing wires present; will use in setting of symptomatic arrhythmia.     Neurological: Neuro checks per ICU  protocol.     Sedate with precedex until ready to wean and extubate.     Pain control: PRN    GI/:     NPO until extubated and fully awake.     Borges catheter in place for accurate measurement of I/O.     GI prophylaxis:  Omeprazole    Renal: Monitor I/O's.  Electrolyte repletion PRN.  Avoid/limit nephrotoxic agents.     IVFs: per CV-surgery    Heme/Coag: Monitor H/H.     Transfuse per CV-surgery.     Monitor chest tube output hourly; notify CV-surgery for excessive output or abrupt stop in output.     DVT prophylaxis:  SubQ heparin    Infectious disease: General precautions.     Remove lines and drains once no longer required.     Endocrine:     RHI gtt per ICU protocol.     Musculoskeletal:     Bedrest; initiate mobility protocol.     Lines: A-line, Day# 1, Central line, Day# 1 or Bieber Cindy, Day# 1      Code Status:  full    Thank you for this interesting consultation.  Please call if any questions or concerns.      This patient is considered critically ill and requires ICU level of care due to immediate post CV-surgical procedure. High risk for acute hemodynamic collapse and death.     Total Critical Care time, not including separate billable procedure time:  45 minutes.    Stacy Wynne [facility] Pulmonary/Critical Care      Chief Complaint CAD, chronic Afib       HPI    Giacomo Schneider Sr. is a 74 y.o. male with CAD, chronic afib, CARMITA(non compliant with CPAP) was admitted today for CABG x 3 and JUDY ligation by DR Bai.     History is provided by:chart review and bedside handoff by Dr Bai and Dr West.  The pt was easy to intubate, was given methadone 20 mg preop, EF 50% with mild MR, PAD on the teens, no issues coming off extracorporeal circulation, postop EF 55%, last hgb prior to ICU transfer was 11, DDAVP was given, presently V paced 100%, to ICu on full vent support, on precedex and epi drips.       Review of Systems   Review of systems not obtained due to inability to communicate with the  patient.      Active Problem List   Patient Active Problem List    Diagnosis Date Noted     S/P CABG (coronary artery bypass graft) 05/01/2019     ARF (acute respiratory failure) (H) 05/01/2019     Anemia due to blood loss, acute 05/01/2019     Abnormal computed tomography angiography (CTA) 04/19/2019     Elevated coronary artery calcium score 04/19/2019     FONSECA (dyspnea on exertion) 04/10/2019     Abnormal cardiovascular stress test 04/10/2019     Essential hypertension 08/13/2018     Bilateral inguinal hernia 10/20/2015     Mixed hyperlipidemia      Atrial fibrillation (H)      Adult Sleep Apnea      Esophageal Reflux          Medical/Surgical History   Past Medical History:   Diagnosis Date     Atrial fibrillation (H)      BPH (benign prostatic hyperplasia)      CAD (coronary artery disease)      Cancer (H)     basal cell carcinoma of skin     Coronary atherosclerosis      Diverticulosis      GERD (gastroesophageal reflux disease)      H/O cardiovascular stress test     negative 2012     Hyperlipemia      Impaired fasting glucose      Low back pain      Sleep apnea     unable to tolerate CPAP     Spermatocele      Past Surgical History:   Procedure Laterality Date     CV CORONARY ANGIOGRAM N/A 4/22/2019    Procedure: Coronary Angiogram;  Surgeon: Qamar Martin MD;  Location: [facility] Cath Lab;  Service: Cardiology     LAPAROSCOPIC INGUINAL HERNIA REPAIR Bilateral 12/3/2015    Procedure: LAPAROSCOPIC BILATERAL INGUINAL HERNIA REPAIR WITH BILATERAL MESH;  Surgeon: Bran Puckett MD;  Location: [facility] OR;  Service:          Allergies   Allergies   Allergen Reactions     Budesonide Unknown     Sulfa (Sulfonamide Antibiotics) Unknown     Thimerosal Unknown         Medications:  OUTpatient medications   Prior to Admission medications    Medication Sig Start Date End Date Taking? Authorizing Provider   ASCORBIC ACID-MULTIVIT-MIN ORAL Take 1 tablet by mouth daily.   Yes PROVIDER, HISTORICAL    aspirin 81 MG EC tablet Take 81 mg by mouth at bedtime.   Yes PROVIDER, HISTORICAL   atorvastatin (LIPITOR) 40 MG tablet Take 1 tablet (40 mg total) by mouth at bedtime. 4/10/19 4/9/20 Yes Paulina Patel MD   betamethasone, augmented, (DIPROLENE-AF) 0.05 % cream Apply 1 application topically as needed.        5/8/17  Yes PROVIDER, HISTORICAL   calcium carbonate-vitamin D3 (CALCIUM 600 + D,3,) 600 mg(1,500mg) -400 unit per tablet Take 1 tablet by mouth daily.   Yes PROVIDER, HISTORICAL   carboxymethylcellulose (REFRESH PLUS) 0.5 % Dpet ophthalmic dropperette Administer 1 drop to both eyes at bedtime.   Yes PROVIDER, HISTORICAL   doxycycline (VIBRAMYCIN) 100 MG capsule Take 100 mg by mouth 2 (two) times a day.   Yes PROVIDER, HISTORICAL   glucosamine-chondroitin (OSTEO BI-FLEX) 250-200 mg Tab Take 2 tablets by mouth daily.          Yes PROVIDER, HISTORICAL   hydrocortisone valerate (WEST-SAVANAH) 0.2 % ointment Apply 1 application topically as needed (uses on hands and ankle).          Yes PROVIDER, HISTORICAL   ketoconazole (NIZORAL) 2 % cream Apply 1 application topically as needed. Apply a thin layer to affected area(s) twice daily as needed  For athletes foot         Yes PROVIDER, HISTORICAL   krill-om-3-dha-epa-phospho-ast (MEGARED OMEGA-3 KRILL OIL) 268-050-61-64 mg cap Take 1 capsule by mouth daily.   Yes PROVIDER, HISTORICAL   metoprolol tartrate (LOPRESSOR) 50 MG tablet Take 1 tablet (50 mg total) by mouth 2 (two) times a day. 11/27/18  Yes Solomon Vee MD   metroNIDAZOLE (METROCREAM) 0.75 % cream Apply 1 application topically 2 (two) times a day. To sore on nose   Yes PROVIDER, HISTORICAL   multivitamin therapeutic tablet Take 1 tablet by mouth daily.   Yes PROVIDER, HISTORICAL   omeprazole (PRILOSEC) 20 MG capsule Take 20 mg by mouth 2 (two) times a day.          Yes Dionisio Alvarez MD   s-adenosylmethionine (CECY-E) 400 mg Tab Take 1 tablet by mouth daily.   Yes PROVIDER, HISTORICAL   VENTOLIN HFA 90  mcg/actuation inhaler Inhale 1 puff as needed.  2/15/17  Yes PROVIDER, HISTORICAL   vitamin E 400 unit capsule Take 400 Units by mouth daily.   Yes PROVIDER, HISTORICAL   FLUZONE HIGH-DOSE 2016-17, PF, 180 mcg/0.5 mL Syrg injection TO BE ADMINISTERED BY PHARMACIST FOR IMMUNIZATION 10/21/16   PROVIDER, HISTORICAL   calcium carbonate 1250 MG capsule Take 1,250 mg by mouth daily.  5/1/19  PROVIDER, HISTORICAL           Medications:  INpatient medications     acetaminophen  650 mg Oral Q6H    Or     acetaminophen  650 mg Rectal Q6H     [START ON 5/2/2019] aspirin  81 mg Oral DAILY     atorvastatin  40 mg Oral QHS     [START ON 5/2/2019] bisacodyl  10 mg Oral Once     [START ON 5/4/2019] bisacodyl  10 mg Rectal Once     ceFAZolin (ANCEF) IV  2 g Intravenous Q8H     chlorhexidine  15 mL Topical Q12H     [START ON 5/2/2019] docusate sodium  100 mg Oral BID     [START ON 5/2/2019] heparin (PF)  5,000 Units Subcutaneous Q8H FIXED TIMES     [START ON 5/3/2019] magnesium hydroxide  30 mL Oral DAILY     melatonin  3 mg Oral QHS     mupirocin  1 application Each Nare BID     [START ON 5/2/2019] omeprazole  20 mg Oral QAM AC     [START ON 5/2/2019] polyethylene glycol  17 g Oral DAILY           Family History  Social History     Reviewed  Family History   Problem Relation Age of Onset     Hypertension Father      No Medical Problems Mother      No Medical Problems Sister      No Medical Problems Brother      No Medical Problems Maternal Aunt      No Medical Problems Maternal Uncle      No Medical Problems Paternal Aunt      No Medical Problems Paternal Uncle      No Medical Problems Maternal Grandmother      No Medical Problems Maternal Grandfather      No Medical Problems Paternal Grandmother      No Medical Problems Paternal Grandfather      Anesthesia problems Neg Hx      Cancer Neg Hx      Clotting disorder Neg Hx      Diabetes Neg Hx      Dislocations Neg Hx      Osteoporosis Neg Hx      Psoriasis Neg Hx      Rashes / Skin  problems Neg Hx      Severe sprains Neg Hx      Ulcerative colitis Neg Hx        Reviewed  Social History     Socioeconomic History     Marital status:      Spouse name: Not on file     Number of children: Not on file     Years of education: Not on file     Highest education level: Not on file   Occupational History     Not on file   Social Needs     Financial resource strain: Not on file     Food insecurity:     Worry: Not on file     Inability: Not on file     Transportation needs:     Medical: Not on file     Non-medical: Not on file   Tobacco Use     Smoking status: Light Tobacco Smoker     Types: Cigars     Smokeless tobacco: Current User     Types: Chew     Tobacco comment: 1 cigar per day   Substance and Sexual Activity     Alcohol use: Yes     Comment: rarely     Drug use: No     Sexual activity: Not on file   Lifestyle     Physical activity:     Days per week: Not on file     Minutes per session: Not on file     Stress: Not on file   Relationships     Social connections:     Talks on phone: Not on file     Gets together: Not on file     Attends Rastafarian service: Not on file     Active member of club or organization: Not on file     Attends meetings of clubs or organizations: Not on file     Relationship status: Not on file     Intimate partner violence:     Fear of current or ex partner: Not on file     Emotionally abused: Not on file     Physically abused: Not on file     Forced sexual activity: Not on file   Other Topics Concern     Not on file   Social History Narrative     Not on file      Psychosocial Needs  Social History     Social History Narrative     Not on file     Additional psychosocial needs reviewed per nursing assessment.      Physical Exam   VITALS:  /59   Pulse 89   Temp 97.9  F (36.6  C) (Bladder)   Resp 16   Ht 6' (1.829 m)   Wt (!) 228 lb 1.6 oz (103.5 kg)   SpO2 100%   BMI 30.94 kg/m    Temp:  [97.5  F (36.4  C)-97.9  F (36.6  C)] 97.9  F (36.6  C)  Heart Rate:   [59-89] 89  Resp:  [16] 16  BP: (104-145)/(59-89) 104/59  SpO2:  [96 %-100 %] 100 %    PHYSICAL EXAM:   GEN: in bed sedated on full vent support  HEENT:  NC PERRL OETT, bridge of nose, on right side with a sctrach  NECK: Supple.  Trachea midline  PULM:  CTA no air leak in CTs  CVS:   V paced  ABDOMEN:   soft  EXTREMITIES/SKIN:    No e/c/c  NEURO:  .  Unknown, pt sedated and on full vent suport    I&O:      Intake/Output Summary (Last 24 hours) at 5/1/2019 1200  Last data filed at 5/1/2019 1118  Gross per 24 hour   Intake 1850 ml   Output 1543 ml   Net 307 ml        Pertinent Labs   Lab Results: personally reviewed.     Serum Glucose range:No results for input(s): POCGLU in the last 72 hours.    No results for input(s): PHART, JFF6MVX, PO2ART, OXYHB, BEARTCALC, CARBOXYHGB, METHGB, POCPEEP, TEMP, 07454, POCRATE, POCFLOW, PSV in the last 72 hours.    Invalid input(s): TQ48DOEGPSS, 02SAT, VENTTIVOL  CBC:  Results from last 7 days   Lab Units 04/30/19  0940   LN-WHITE BLOOD CELL COUNT thou/uL 7.4   LN-HEMOGLOBIN g/dL 15.3   LN-HEMATOCRIT % 46.4   LN-PLATELET COUNT thou/uL 193     Chemistry:  Results from last 7 days   Lab Units 04/30/19  0940   LN-SODIUM mmol/L 137   LN-POTASSIUM mmol/L 4.5   LN-CHLORIDE mmol/L 106   LN-CO2 mmol/L 22   LN-BLOOD UREA NITROGEN mg/dL 17   LN-CREATININE mg/dL 0.97   LN-CALCIUM mg/dL 10.0   LN-MAGNESIUM mg/dL 2.1     Coags:  Lab Results   Component Value Date    INR 1.51 (H) 05/01/2019    INR 1.04 04/30/2019          Cardiac/Radiology Studies   Cardiac:  EKG:   Postop presently not done, pt is paced  Radiology:    Chest X-Ray: postop pending      PROVIDER RESTRAINT FOR NON-VIOLENT BEHAVIOR FACE TO FACE EVALUATION    Patient's Immediate Situation:  Patient demonstrated the following behaviors: Pulling/tugging at invasive lines or tubes and does not respond to verbal/non-verbal redirection    Patient's Reaction to the intervention:  Does patient understand the reason for restraint/seclusion?  Unable to Express    Medical Condition:  Is there any evidence of compromise of Skin integrity, Respiratory, Cardiovascular, Musculoskeletal, Hydration? No    Behavioral Condition:  In consultation with the RN, is there a need to continue this restraint or seclusion? Yes    See Restraint Flowsheet for complete restraint documentation and assessment.    Stacy Wynne, CNP

## 2021-05-28 NOTE — PROGRESS NOTES
CVTS Daily Progress Note   5/3/2019   POD #2 s/p CABG x 3 LAAL  DOS 5/1/10- Dr. Bai   LOS: 2 days   EF 55%  A1C 6.3 %    Overnight events:  No acute events overnight.   Chronic atrial fib      Maintaining oxygen saturations on RA.   Normotensive.    Pain controlled: tylenol/tramadol  -BM / flatus.   Tolerating diet:  no nausea.   UOP adequate.  .   Patient denies new chest pain, shortness of breath, abdominal pain, calf pain, nausea.     Patient main concern today: CT and duque removal       PLAN  Continue Diuresis  CT and duque out  Start Eliquis  Transition to oral amiodarone  Increase BB    OBJECTIVE:    Temp:  [97.6  F (36.4  C)-98.5  F (36.9  C)] 97.6  F (36.4  C)  Heart Rate:  [79-93] 87  Resp:  [18-20] 19  BP: (100-151)/(55-71) 116/66  Wt Readings from Last 2 Encounters:   05/03/19 0545 (!) 237 lb (107.5 kg)   05/02/19 0500 (!) 236 lb 11.2 oz (107.4 kg)   05/01/19 0709 (!) 228 lb 1.6 oz (103.5 kg)   04/30/19 0957 (!) 233 lb 11 oz (106 kg)       Current Medications:    Scheduled Meds:    acetaminophen  650 mg Oral Q6H     amiodarone  200 mg Oral BID     apixaban  5 mg Oral BID     aspirin  81 mg Oral DAILY     atorvastatin  80 mg Oral QHS     [START ON 5/4/2019] bisacodyl  10 mg Rectal Once     docusate sodium  100 mg Oral BID     furosemide  40 mg Intravenous TID     insulin aspart (NovoLOG) injection   Subcutaneous TID with meals     insulin aspart (NovoLOG) injection   Subcutaneous QHS     magnesium hydroxide  30 mL Oral DAILY     melatonin  3 mg Oral QHS     metoprolol tartrate  12.5 mg Oral 0330, 1300, 1700 - CV Metoprolol     metoprolol tartrate  25 mg Oral BID     metroNIDAZOLE  1 application Topical BID     omeprazole  20 mg Oral QAM AC     polyethylene glycol  17 g Oral DAILY     potassium chloride ER  20 mEq Oral BID     Continuous Infusions:    amiodarone 900 mg/500 ml standard 24 hour infusion 0.5 mg/min (05/03/19 0251)     PRN Meds:.acetaminophen, acetaminophen, albumin human,  aluminum-magnesium hydroxide-simethicone, bisacodyl, [START ON 5/4/2019] bisacodyl, calcium  gluconate IVPB, dextrose 50 % (D50W), glucagon (human recombinant), magnesium hydroxide, naloxone **OR** naloxone, ondansetron, oxyCODONE, sodium chloride, sodium phosphates 133 mL, traMADol, traZODone    Cardiographics:    Telemetry monitoring demonstrates atrial fib  with rates in the 80s     Imaging:    No results found.    Lab Results (most recent, reviewed):    Hemoglobin (g/dL)   Date Value   05/02/2019 10.8 (L)     WBC (thou/uL)   Date Value   05/02/2019 9.6     Potassium (mmol/L)   Date Value   05/03/2019 4.5     Creatinine (mg/dL)   Date Value   05/02/2019 1.07     Hemoglobin A1c (%)   Date Value   04/30/2019 6.3 (H)     Magnesium (mg/dL)   Date Value   05/03/2019 2.1     Calcium (mg/dL)   Date Value   05/02/2019 8.9       (!) 237 lb (107.5 kg)  Admit weight      UOP: 3.3 L/24 hours  CT: 200 cc/24 hours    Physical Exam:    General: Patient seen  up in chair and up with CR.    NAD. Conversant. Pleasant.   CV: Atrial fib  Pulm: Non-labored effort on RA.    Sternal and left knee SVG  Incision  C/D/I.  Abd: Soft, NT, ND, no flatus  : voiding without issue  Ext: Mild pedal edema, SCDs in place, warm  Neuro: CNs grossly intact. Equal hand grasps, DELONG       ASSESSMENT/PLAN:    Giacomo Quezadajanae Wen. is a 74 y.o. male with a history of CAD, atrial fib who is now s/p CABG and LAAL.    Principal Problem:    S/P CABG (coronary artery bypass graft)  Active Problems:    Atrial fibrillation (H)    ARF (acute respiratory failure) (H)    Anemia due to blood loss, acute      NEURO:   - Scheduled Tylenol and PRN oxycodone/tramadol for pain  - Melatonin QHS    CV:   - Normotensive  - BB Metoprolol 12.5 to 25 mg two times a day  - CV BB protocol  - Amiodarone IV gtt to be completed tonight  - transition to oral  - ASA 81mg  - Atorvastatin 80mg daily- increased to high dose statin  - Eliquis    PULM:   - Maintaining oxygen saturations  on 2L/nc  - Encourage pulmonary toilet  - IS 1200, using flutter valve, using pillow    FEN/GI:  - No BM, No flatus  - Continue electrolyte replacement protocol  - Diet: Cardiac, ADAT   - Bowel regimen    RENAL:  - Cr 1.07  - Borges to remain in for close monitoring of I/O and during period of diuresis/relative immobility.   - Diuresis with 40mg IV Lasix three times a day  - Kdur 20 two times a day    HEME:  - Acute blood loss anemia post-op.   - Hgb stable, no bleeding concerns. Hep SQ, [id number]    ID:  - Marla op ppx complete, afebrile . No concerns for infection    ENDO:     - Transition to SSI    PPx:   - DVT: SCDs, SQ heparin three times a day   - GI: Omeprazole   - Ambulating with therapy CR to see today.    DISPO:   - Transfer to general telemetry floor     Patient seen and discussed with Dr. Reid/Bhumika

## 2021-05-28 NOTE — PLAN OF CARE
Care Plan  Care Management      Care Management Goals of the Day: Discharge    Care Progression Reviewed With: Charge RN, MD, HUC, RNCM, CMSW    Barriers to Discharge: none    Discharge Disposition: home    Expected Discharge Date: 05/06/19    Transportation: family      Care Coordination Narrative: Pt will d/c home with outpatient CR at [facility]. Family to transport. Referral sent to [facility] for PT/OT. Pt states he will decide if he wants therapy at home after he gets home.     11:48 AM  NA Ennis LG

## 2021-05-28 NOTE — CONSULTS
DATE OF SERVICE: 04/23/2019    REFERRING PHYSICIAN AND REASON FOR CONSULTATION:  Asked to evaluate this patient for  coronary artery bypass grafting by Dr. Paulina Patel.    HISTORY OF PRESENT ILLNESS:  Mr. Schneider is a 73-year-old gentleman with history of  coronary calcification.  This was seen on a noncardiac CT scan.  He has chronic  atrial fibrillation and refuses anticoagulation other than a large aspirin.  He is  followed by Dr. Vee.  He had an abnormal stress test and subsequent coronary  angiography revealed coronary artery disease.  He is active.  He chops wood; he does  yard work; and he plays in a senior softball league.  He has not had chest pain, but  does have chronic mild dyspnea when he walks up a flight of stairs or after an hour  of chopping wood.  He did have an episode of upper abdominal pain after getting out  of a chair in January.    PAST SURGICAL AND MEDICAL HISTORY  SURGICAL PROCEDURES:  Laparoscopic inguinal hernia repair in 2015.    MEDICAL PROBLEMS:  1.  Atrial fibrillation.  2.  BPH.  3.  History of basal cell carcinoma of the skin.  4.  Coronary artery disease.  5.  Diverticulosis.  6.  GERD.  7.  Hyperlipidemia.  8.  Impaired fasting glucose.  9.  Sleep apnea.  10.  Spermatocele.    ALLERGIES:  BUDESONIDE - UNKNOWN, SULFA - UNKNOWN, THIMEROSAL - UNKNOWN.      CURRENT MEDICATIONS:  See chart.    FAMILY HISTORY:  Essentially negative other than hypertension in his father.    SOCIAL HISTORY:  He is retired.  He is very active, chopping wood and playing  softball.  He smokes cigars.  He used to use smokeless tobacco, including chew, and  he does drink alcohol.    REVIEW OF SYSTEMS:  CONSTITUTIONAL:  No recent weight loss or decrease in activity.  HEENT:  Unremarkable.  RESPIRATORY:  Positive for snoring.  Heart shortness of breath with activity and  atrial fibrillation.  GASTROINTESTINAL:  Positive for GERD and diverticulosis.  GENITOURINARY:  Positive for some frequent  urination.  NEUROLOGIC:  No symptoms of stroke or TIA.  MUSCULOSKELETAL:  Noncontributory.    PHYSICAL EXAMINATION:  APPEARANCE:  Stocky elderly gentleman in no acute distress.  Height is 5 feet 1  inch, weight is 106 kilograms.  VITAL SIGNS:  Temperature afebrile, respiratory rate 20, heart rate 60, blood  pressure 130/70.  SKIN:  Scattered senile changes.  LYMPH NODES:  No palpable lymphadenopathy.  HEENT:  Normocephalic.  PERRL.  EOMs intact.  ENT unremarkable.  NECK:  Supple, without carotid bruits.  CHEST:  Without deformity.  LUNGS:  Clear to auscultation.  HEART:  Irregularly irregular without murmur, gallops or rubs.  Pulses 2+ and  symmetrical.  ABDOMEN:  Soft, nontender, without palpable organomegaly, normoactive bowel sounds.  GENITOURINARY AND RECTAL:  Deferred.  NEUROLOGIC:  Grossly intact.  BACK:  Without deformity.  EXTREMITIES:  Full range of motion without clubbing, cyanosis, edema.    LABORATORY:  Remarkable for a creatinine of 1.14.    ASSESSMENT:  This gentleman has severe double vessel coronary artery disease as well  as atrial fibrillation.  He does not want to take Coumadin.  I believe he would  benefit from coronary artery bypass grafting.  Targets include the left anterior  descending, a diagonal, and his right coronary system.  For conduit, we would use  the left internal mammary artery and reverse saphenous vein graft.  Given the  patient's history of atrial fibrillation, we will certainly place an AtriClip on his  left atrial appendage.  He understands that the risks for these 2 procedures include  bleeding, infection, stroke, sternal dehiscence, myocardial infarction, arrhythmias,  the need for a pacemaker, prolonged ventilation, pneumonia liver/renal failure,  aortic dissection, and an operative mortality of 2%.  He accepts these risks and is  agreeable with the plan of undergoing surgery on 05/01.      GHASSAN ROACH MD  ln  D 04/24/2019 14:14:12  T 04/24/2019 15:01:20  R 04/24/2019  15:01:20  [id number]        cc:SOPHIE ROACH MD

## 2021-05-28 NOTE — PROGRESS NOTES
RCAT Treatment Plan    Patient Score: 13      Patient Acuity: 3    Clinical Indication for Therapy: post cab       Therapy Ordered: deep breathing techniques     Assessment Summary:     05/02/19 1738   Reason for Assessment (RCAT)   RCAT Assesment Re-eval   Assessment Reason  Cardiac surgery   $ RCAT Eval Time 15 min.  Yes   Chart Assessment (RCAT)   Pulmonary Status  3   Surgical Status  3   Chest Xray  2   Patient Assessment (RCAT)    Respiratory Pattern  1   Mental Status  1   Breath Sounds  0   Cough Effectiveness  0   Level of Activity  1   O2 Required SpO2 >= 92%  1   Chart + Pt. Assessment Total Points  12   RCAT Acuity Score 12 (Acuity 3)   Clinical Indications (RCAT)   Volume Expansion Therapy Atelectasis   RCAT Order Placed  No         KIERSTEN Ashley  5/2/2019

## 2021-05-28 NOTE — TELEPHONE ENCOUNTER
Called and spoke with the patient. Reviewed results and recommendations. Pt agreeable to proceed with angiogram, and wants to get it moving. Order placed. Request to . KENTON,RN

## 2021-05-28 NOTE — PLAN OF CARE
"  Patient Name: Giacomo Schneider .   MRN: [id number]   Date of Admission: 5/1/2019    Procedure: CORONARY ARTERY BYPASS X 3 ENDOSCOPIC VEIN HARVEST, INTERNAL MAMMARY ARTERY, ANESTHESIA TRANSESOPHAGEAL ECHOCARDIOGRAM, LEFT ATRIAL APPENDAGE LIGATION AND EPI AORTIC ULTRASOUND    Post Op day #:2    Subjective (Patient focus/Primary Problem for shift): \" My catheter is leaking\"          Pain Goal0 Pain Rating 3           Pain Medication/ Regime effective to reduce patient pain Scheduled pain meds effective for pain per pt.     Objective (Physical assessment):           Rhythm: atrial fibrillation            Bowel Activity: no if Yes indicate when:           Bowel Medications: yes            Incision: healing well          Incentive Spirometry Q 1-2 hour when awake:  yes Volume: 750ml          Epicardial Pacing Wires:  not applicable            Patient Activity:           Up to chair for meals: yes          Ambulation with RN x2 (Not including CR): no            Is patient in home clothes:no                                 Urinary Catheter: yes           Preventative WOC consult (need MD order): not applicable       Assessment (Nursing primary shift focus): Alert and oriented X3. Scheduled pain meds effective for pain. Assist X1 with activity. Borges cath changed due to leakage.     Plan (Patient Care Plan/focus): Continues to use IS, monitor I/O and pain control.       Suni Monahan   5/3/2019   6:18 PM            "

## 2021-05-28 NOTE — PROGRESS NOTES
CVTS Daily Progress Note   5/5/2019   POD #4 s/p CABG x 3 LAAL  DOS 5/1/10- Dr. Bai   LOS: 4 days   EF 55%  A1C 6.3 %    Overnight events:  No acute events overnight.   Chronic atrial fib -Elliquis -Amio     Maintaining oxygen saturations on RA.   Normotensive.    Pain controlled: tylenol/tramadol   +BM / flatus.   Tolerating diet:  no nausea.   UOP adequate.    Patient denies new chest pain, shortness of breath, abdominal pain, calf pain, nausea.     Patient main concern today: Anxiety and frustrated with frequent need to urinate    PLAN  Stop Diuresis  Seroquel tonight for sleep    Hold bowel meds  Anticipate dc home tomorrow- with [facility]    OBJECTIVE:    Temp:  [97.3  F (36.3  C)-98.1  F (36.7  C)] 97.7  F (36.5  C)  Heart Rate:  [65-87] 82  Resp:  [18-22] 18  BP: ()/(61-85) 99/62  Wt Readings from Last 2 Encounters:   05/05/19 1133 (!) 227 lb 11.2 oz (103.3 kg)   05/04/19 0322 (!) 236 lb 6.4 oz (107.2 kg)   05/03/19 0545 (!) 237 lb (107.5 kg)   05/02/19 0500 (!) 236 lb 11.2 oz (107.4 kg)   05/01/19 0709 (!) 228 lb 1.6 oz (103.5 kg)   04/30/19 0957 (!) 233 lb 11 oz (106 kg)       Current Medications:    Scheduled Meds:    acetaminophen  650 mg Oral Q6H     amiodarone  200 mg Oral BID     apixaban  5 mg Oral BID     aspirin  81 mg Oral DAILY     atorvastatin  80 mg Oral QHS     bisacodyl  10 mg Rectal Once     docusate sodium  100 mg Oral BID     insulin aspart (NovoLOG) injection   Subcutaneous TID with meals     insulin aspart (NovoLOG) injection   Subcutaneous QHS     melatonin  3 mg Oral QHS     metoprolol tartrate  12.5 mg Oral 0330, 1300, 1700 - CV Metoprolol     metoprolol tartrate  50 mg Oral BID     metroNIDAZOLE  1 application Topical BID     omeprazole  20 mg Oral QAM AC     polyethylene glycol  17 g Oral DAILY     QUEtiapine  50 mg Oral QHS       PRN Meds:.acetaminophen, acetaminophen, albumin human, aluminum-magnesium hydroxide-simethicone, bisacodyl, bisacodyl, calcium  gluconate IVPB,  dextrose 50 % (D50W), glucagon (human recombinant), magnesium hydroxide, naloxone **OR** naloxone, ondansetron, sodium chloride, sodium phosphates 133 mL, traMADol, traZODone    Cardiographics:    Telemetry monitoring demonstrates atrial fib  with rates in the 80s     Imaging:   Right IJ sheath and [place] catheter, as well as mediastinal and left chest tubes have been removed. Sternotomy redemonstrated. Stable cardiomegaly with normal vascularity. Slight interval improvement in left lower lobe atelectasis and small   left pleural effusion. Right lung is clear. No pneumothorax.      Lab Results (most recent, reviewed):    Hemoglobin (g/dL)   Date Value   05/02/2019 10.8 (L)     WBC (thou/uL)   Date Value   05/02/2019 9.6     Potassium (mmol/L)   Date Value   05/05/2019 4.0     Creatinine (mg/dL)   Date Value   05/05/2019 1.04     Hemoglobin A1c (%)   Date Value   04/30/2019 6.3 (H)     Magnesium (mg/dL)   Date Value   05/05/2019 2.3     Calcium (mg/dL)   Date Value   05/05/2019 9.5       (!) 227 lb 11.2 oz (103.3 kg)  Admit weight 103.4 kg  Yesterday 107 kg      Unmeasured output      Physical Exam:    General: Patient seen  up in chair and up with CR.    NAD. Conversant. Pleasant.   CV: Atrial fib CVR  Pulm: Non-labored effort on RA.    Sternal and left knee SVG  Incision  C/D/I.  Abd: Soft, NT, ND, Lg BM  : voiding without issue  Ext: Mild pedal edema, SCDs in place, warm  Neuro: CNs grossly intact. Equal hand grasps, DELONG       ASSESSMENT/PLAN:    Giacomo Quezadajanae Wen. is a 74 y.o. male with a history of CAD, atrial fib who is now s/p CABG and LAAL.    Principal Problem:    S/P CABG (coronary artery bypass graft)  Active Problems:    Atrial fibrillation (H)    ARF (acute respiratory failure) (H)    Anemia due to blood loss, acute      NEURO:   - Scheduled Tylenol and PRN tramadol for pain  - Melatonin QHS    CV:   - Normotensive  - BB Metoprolol 50 mg two times a day  - CV BB protocol  - Amiodarone  - ASA  81mg  - Atorvastatin 80mg daily- increased to high dose statin  - Eliquis    PULM:   - Maintaining oxygen saturations on RA  - Encourage pulmonary toilet  - IS [id number], using flutter valve, using pillow    FEN/GI:  - + BM,   - Continue electrolyte replacement protocol  - Diet: Cardiac, ADAT   - Bowel regimen    RENAL:  - Cr 1.04  - Voiding without difficulty    HEME:  - Acute blood loss anemia post-op.   - Hgb stable, no bleeding concerns. Hep SQ, [id number]    ID:  - Marla op ppx complete, afebrile . No concerns for infection    ENDO:     - Transition to SSI    PPx:   - DVT: SCDs, SQ heparin three times a day   - GI: Omeprazole   - Ambulating with therapy CR .    DISPO:   Home in am if he remains stable   Patient seen and discussed with Dr. Alvarenga.

## 2021-05-28 NOTE — TELEPHONE ENCOUNTER
Pt and wife driving to family in [place] for the weekend. Agreeable to angiogram education via phone. On speaker phone to both. Education completed. Opportunity to ask questions and have them answered. Unable to mail letter and educaitonal materials Wife agreeable to receive via email. Will send. KENTON,RN

## 2021-05-28 NOTE — ANESTHESIA PREPROCEDURE EVALUATION
Anesthesia Evaluation      Patient summary reviewed   No history of anesthetic complications     Airway   Mallampati: II  Neck ROM: limited   Pulmonary - normal exam   (+) sleep apnea on no CPAP, ,   (-) shortness of breath                         Cardiovascular - normal exam  (+) hypertension, CAD, dysrhythmias (Afib), , hypercholesterolemia,     ECG reviewed        Neuro/Psych - negative ROS     Endo/Other    (+) obesity,      GI/Hepatic/Renal    (+) GERD well controlled,        Other findings: Cath:   Left Main  The vessel is moderate in size and is angiographically normal.  Left Anterior Descending  Collaterals  Dist LAD filled by collaterals from 1st Diag.    Prox LAD to Mid LAD lesion is 100% stenosed.  First Diagonal Branch  1st Diag lesion is 90% stenosed.  Left Circumflex  The vessel is moderate in size. The vessel exhibits minimal luminal irregularities.  Right Coronary Artery  Dist RCA lesion is 70% stenosed.  Right Posterior Descending Artery      ECHO: pending    Carotid U/S: CONCLUSION:  1. Moderate atheromatous plaque in the carotid arteries. There areas of bulky calcified plaque in the bulb and internal carotid artery that does obstruct visualization of portions of the vessels. However, no significantly abnormal waveforms are seen. If   there is clinical concern for carotid disease, would recommend further evaluation with CTA of the neck.  2. The bilateral internal carotid arteries demonstrate mild disease by velocity criteria (less than 50% stenosis).      Dental    (+) caps and chipped    Comment: Some implanted teeth                       Anesthesia Plan  Planned anesthetic: general endotracheal  Methadone 20mg, consider ketamine  NATALIO  ASA 3   Induction: intravenous   Anesthetic plan and risks discussed with: patient  Anesthesia plan special considerations: video-assisted, CVP line, arterial catheterization, pulmonary artery catheterization, IV therapy two IVs, dexmedetomidine  Post-op plan:  extended intubation/vent support

## 2021-05-28 NOTE — PLAN OF CARE
Problem: Mechanical Ventilation  Goal: Patient will maintain patent airway  Outcome: Completed  Goal: Oral health is maintained or improved  Outcome: Completed  Goal: Respiratory status - ventilation  Description  Movement of air in and out of the lungs.    Liberate from ventilator  Outcome: Completed  Goal: ET tube will be managed safely  Outcome: Completed     Problem: Risk of Injury Due to Unsafe Behavior  Goal: Patient will remain safe while in restraint; physical/psychological needs will be met  Outcome: Completed  Goal: Alternatives to restraint will be continually assessed with use of least restrictive device and discontinuation as soon as possible  Outcome: Completed  Goal: Patient/Family will be able to communicate reason for restraint and steps for restraint application and removal  Outcome: Completed     Problem: Day of Surgery  Goal: Patient/Family/Caregiver demonstrates understanding of restraint use/application and pain managment  Outcome: Completed  Goal: Set pain goal expectations and achieve pain/discomfort control  Outcome: Completed  Goal: Mobility/activity is initiated  Outcome: Completed  Goal: Patient will maintain patent airway  Outcome: Completed  Goal: Day of surgery nutrition  Outcome: Completed  Goal: Maintain hemodynamics  Outcome: Completed

## 2021-05-28 NOTE — PLAN OF CARE
Care Plan  Care Management    Care Management Goals of the Day: Follow progression of care    Care Progression Reviewed With: Charge RN, MD, FARZANA, CMSW    Barriers to Discharge: POD 2, CABG x 3    Discharge Disposition: Home with outpt CR    Expected Discharge Date: 5/6/19    Transportation: Family    Care Coordination Narrative: Pt lives at home with his wife, is independent at baseline. He plans on discharging home with outpatient cardiac rehab at [facility]. CM to follow and assist as needed.

## 2021-05-28 NOTE — PROGRESS NOTES
Giacomo PARIKH [facility].  [address] [address] [place] [id number]  [phone] (home)     Procedure cardiologist:  Dr. Sharma  PCP:  Giovani Ponce MD  H&P completed by:  4/10 Dr. Patel  Admit date 04/22/19  Arrival time:  10:00 am  Anticoagulation: None  CPAP: No. Diagnosed with Sleep apnea.  Previous PCI: None  Bypass Grafts: No  Renal Issues: No  Diabetic?: No  Device?: No      Angiogram Teaching    Reason for Visit:  Telephone call to discuss pre-procedure education in preparation for: Coronary Angiogram with Possible Percutaneous Coronary Intervention    Procedure Prep:  Primary Cardiologist note dated: Dr. Vee  EKG results obtained, dated: Morning of Procedure  Hemogram results obtained: Morning of procedure  Basic Metabolic Panel results obtained: Morning of procedure  Lipid Profile results obtained: Morning of procedure    Patient Education  Explained indications/risks for diagnostic evaluation, including one or more of the following:  left heart catheterization, right heart catheterization, coronary angiogram, left ventriculogram, percutaneous arteritomy closure, less than 0.1% of acute myocardial infarction and CVA or death or any of the following to the degree that it could threaten life:  allergic reaction, arrhythmia, renal failure, hemorrhage, thrombosis and infection  Explained indications/risks for therapeutic interventions, including one or more of the following: PTCA, artherectomy, stent, 2% or less risk of MI, less than 1% risk of CVA, emergency heart surgery, death, less than 4 % risk of vascular injury requiring surgical repair or blood transfusion, 20-30% risk of restonosis with a bare metal stent, less than 10% risk of restonosis with a drug-eluting stent, 0.5-1% chance of stent thrombosis and may need clopidogrel (Plavix) form greater than 1 year.  These risks are in addition to baseline risks associated with a Diagnostic Evaluation.  Patient states understanding of procedure and risks and  agrees to proceed.    Pre-procedure instructions  Patient instructed to be NPO after midnight.  Patient instructed to shower the evening before or the morning of the procedure.  Patient instructed to arrange for transportation home following procedure from a responsible family member of friend. No driving for at least 24 hours.  Patient instructed to have a responsible adult with them for 24 hours post-procedure.  Post-procedure follow up process.  Conscious sedation discussed.    Pre-procedure medication instructions  Patient instructed on antiplatelet medication.  Continue medications as scheduled, with a small amount of water on the day of the procedure unless indicated.  Patient instructed to take 325 mg of Aspirin am of procedure: Yes  Other medication: Morning of procedure please HOLD all vitamins, minerals, and supplements. Please TAKE (4) baby aspirin or (1) full size 325 mg aspirin morning of procedure.    *PATIENTS RECORDS AVAILABLE IN [employer] UNLESS OTHERWISE INDICATED*      Patient Active Problem List   Diagnosis     Mixed hyperlipidemia     Atrial fibrillation (H)     Adult Sleep Apnea     Esophageal Reflux     Bilateral inguinal hernia     Essential hypertension     FONSECA (dyspnea on exertion)     Abnormal cardiovascular stress test     Abnormal computed tomography angiography (CTA)     Elevated coronary artery calcium score       Current Outpatient Medications   Medication Sig Dispense Refill     aspirin 325 MG tablet Take 325 mg by mouth daily.       atorvastatin (LIPITOR) 40 MG tablet Take 1 tablet (40 mg total) by mouth at bedtime. 30 tablet 11     betamethasone, augmented, (DIPROLENE-AF) 0.05 % cream        FLUZONE HIGH-DOSE 2016-17, PF, 180 mcg/0.5 mL Syrg injection TO BE ADMINISTERED BY PHARMACIST FOR IMMUNIZATION  0     hydrocortisone valerate (WEST-SAVANAH) 0.2 % ointment Apply 1 application topically as needed.        ketoconazole (NIZORAL) 2 % cream Apply a thin layer to affected area(s) twice daily  as needed       metoprolol tartrate (LOPRESSOR) 50 MG tablet Take 1 tablet (50 mg total) by mouth 2 (two) times a day. 180 tablet 1     omeprazole (PRILOSEC) 20 MG capsule Take 20 mg by mouth 2 (two) times a day.        simvastatin (ZOCOR) 40 MG tablet Take 1 tablet (40 mg total) by mouth bedtime. 90 tablet 3     VENTOLIN HFA 90 mcg/actuation inhaler Inhale 1 puff as needed.        No current facility-administered medications for this visit.        Allergies   Allergen Reactions     Budesonide Unknown     Sulfa (Sulfonamide Antibiotics) Unknown     Thimerosal Unknown     Plan:  Education completed with both wife and patient. Wife will be  and available 24 hours post-procedure.  Pt reports back issues, pain, and stiffness if lays for to long. Discussion with patient to get up and move around as much as possible pre-angiogram. Pt informed of post-procedure bedrest time frames and limitations. Pt understands but requests that nursing be informed of his concerns regarding his back. Pt and wife both had opportunity to ask questions and have them answered. Pt ready for procedure.      JOSE Noel

## 2021-05-28 NOTE — PLAN OF CARE
Problem: Occupational Therapy  Goal: OT Goals  Description  Patient will demonstrate the following by 05/07/2019, in order to maximize independence with ADL/IADL performance:   -Be able to get in/out of bed and up/down from chairs/toilet with independence assist while adhering to cardiac precautions. (sternal/arm use for pacer?s etc.)   -Be able to complete functional mobility in halls for a distance of 500 feet without symptoms in order to return to previous living situation or transfer to appropriate next level of care, safely.   -Be able to complete 14 stairs in order to do the same at home without symptoms independently.   -Be able to verbalize his/her understanding of the importance of daily home exercise program and be given the appropriate walking program given their performance to encourage daily exercise at home before discharge.   -Be able to verbalize and demonstrate understanding of precautions with ADL/home activity before discharge.   Goals entered on 5/2/2019 by Becky Hawley, OTR/L     Outcome: Completed  Occupational Therapy Discharge Summary    Date of OT Discharge: 5/6/2019  Refer to daily doc flowsheet for equipment issued and current functional status.  Discharge Destination: Home  Discharge Comments: Phase II at [facility]      5/6/2019 by Raine Maddne, OT

## 2021-05-28 NOTE — PROGRESS NOTES
CVTS Daily Progress Note   5/4/2019   POD #3 s/p CABG x 3 LAAL  DOS 5/1/10- Dr. Bai   LOS: 3 days   EF 55%  A1C 6.3 %    Overnight events:  No acute events overnight.   Chronic atrial fib -Elliquis -Amio     Maintaining oxygen saturations on RA.   Normotensive.    Pain controlled: tylenol/tramadol  -BM / flatus.   Tolerating diet:  no nausea.   UOP adequate.    Patient denies new chest pain, shortness of breath, abdominal pain, calf pain, nausea.     Patient main concern today: feels a bit rough today. +large BM     PLAN  Continue Diuresis  BMP in am   Increase BB  Hold bowel meds    OBJECTIVE:    Temp:  [97.2  F (36.2  C)-98.3  F (36.8  C)] 97.8  F (36.6  C)  Heart Rate:  [] 86  Resp:  [18-24] 18  BP: (109-145)/(67-74) 133/70  Wt Readings from Last 2 Encounters:   05/04/19 0322 (!) 236 lb 6.4 oz (107.2 kg)   05/03/19 0545 (!) 237 lb (107.5 kg)   05/02/19 0500 (!) 236 lb 11.2 oz (107.4 kg)   05/01/19 0709 (!) 228 lb 1.6 oz (103.5 kg)   04/30/19 0957 (!) 233 lb 11 oz (106 kg)       Current Medications:    Scheduled Meds:    acetaminophen  650 mg Oral Q6H     amiodarone  200 mg Oral BID     apixaban  5 mg Oral BID     aspirin  81 mg Oral DAILY     atorvastatin  80 mg Oral QHS     bisacodyl  10 mg Rectal Once     docusate sodium  100 mg Oral BID     furosemide  40 mg Intravenous TID     insulin aspart (NovoLOG) injection   Subcutaneous TID with meals     insulin aspart (NovoLOG) injection   Subcutaneous QHS     magnesium hydroxide  30 mL Oral DAILY     melatonin  3 mg Oral QHS     metoprolol tartrate  12.5 mg Oral 0330, 1300, 1700 - CV Metoprolol     metoprolol tartrate  50 mg Oral BID     metroNIDAZOLE  1 application Topical BID     omeprazole  20 mg Oral QAM AC     polyethylene glycol  17 g Oral DAILY     potassium chloride ER  20 mEq Oral BID       PRN Meds:.acetaminophen, acetaminophen, albumin human, aluminum-magnesium hydroxide-simethicone, bisacodyl, bisacodyl, calcium  gluconate IVPB, dextrose 50 %  (D50W), glucagon (human recombinant), magnesium hydroxide, naloxone **OR** naloxone, ondansetron, sodium chloride, sodium phosphates 133 mL, traMADol, traZODone    Cardiographics:    Telemetry monitoring demonstrates atrial fib  with rates in the 80s     Imaging:    Xr Chest 2 Views    Result Date: 5/4/2019  EXAM: XR CHEST 2 VIEWS LOCATION: [facility] DATE/TIME: 5/4/2019 8:22 AM INDICATION: Postop cardiac surgery, status post chest tube removal. COMPARISON: 5/2/2019. FINDINGS: Right IJ sheath and Sunman-Cindy catheter, as well as mediastinal and left chest tubes have been removed. Sternotomy redemonstrated. Stable cardiomegaly with normal vascularity. Slight interval improvement in left lower lobe atelectasis and small left pleural effusion. Right lung is clear. No pneumothorax.      Lab Results (most recent, reviewed):    Hemoglobin (g/dL)   Date Value   05/02/2019 10.8 (L)     WBC (thou/uL)   Date Value   05/02/2019 9.6     Potassium (mmol/L)   Date Value   05/04/2019 3.8     Creatinine (mg/dL)   Date Value   05/04/2019 0.99     Hemoglobin A1c (%)   Date Value   04/30/2019 6.3 (H)     Magnesium (mg/dL)   Date Value   05/04/2019 2.2     Calcium (mg/dL)   Date Value   05/04/2019 9.3       (!) 236 lb 6.4 oz (107.2 kg)  Admit weight 103.4 kg  Yesterday 107 kg      Unmeasured output      Physical Exam:    General: Patient seen  up in chair and up with CR.    NAD. Conversant. Pleasant.   CV: Atrial fib  Pulm: Non-labored effort on RA.    Sternal and left knee SVG  Incision  C/D/I.  Abd: Soft, NT, ND, Lg BM  : voiding without issue  Ext: Mild pedal edema, SCDs in place, warm  Neuro: CNs grossly intact. Equal hand grasps, DELONG       ASSESSMENT/PLAN:    Giacomo Schneider . is a 74 y.o. male with a history of CAD, atrial fib who is now s/p CABG and LAAL.    Principal Problem:    S/P CABG (coronary artery bypass graft)  Active Problems:    Atrial fibrillation (H)    ARF (acute respiratory failure) (H)    Anemia due  to blood loss, acute      NEURO:   - Scheduled Tylenol and PRN oxycodone/tramadol for pain  - Melatonin QHS    CV:   - Normotensive  - BB Metoprolol 50 mg two times a day  - CV BB protocol  - Amiodarone  - ASA 81mg  - Atorvastatin 80mg daily- increased to high dose statin  - Eliquis    PULM:   - Maintaining oxygen saturations on RA  - Encourage pulmonary toilet  - IS 1200, using flutter valve, using pillow    FEN/GI:  - No BM, No flatus  - Continue electrolyte replacement protocol  - Diet: Cardiac, ADAT   - Bowel regimen    RENAL:  - Cr 0.99  - Borges to remain in for close monitoring of I/O and during period of diuresis/relative immobility.   - Diuresis with 40mg IV Lasix three times a day  - Kdur 20 two times a day    HEME:  - Acute blood loss anemia post-op.   - Hgb stable, no bleeding concerns. Hep SQ, QUD21ec    ID:  - Marla op ppx complete, afebrile . No concerns for infection    ENDO:     - Transition to SSI    PPx:   - DVT: SCDs, SQ heparin three times a day   - GI: Omeprazole   - Ambulating with therapy CR .    DISPO:   - Transfer to general telemetry floor   Patient seen and discussed with Dr. Alvarenga.

## 2021-05-28 NOTE — TELEPHONE ENCOUNTER
----- Message from Paulina Patel MD sent at 4/18/2019  4:20 PM CDT -----  This is a patient of Dr. Vee's that I saw in [facility]. The CT suggests severe disease which would result in a large are of his heart muscle getting insufficient blood flow. His f/u with Dr. Vee isn't until late May. I recommend that he come in for a coronary angiogram with possible PCI in the mean time. Thank you

## 2021-05-28 NOTE — PROGRESS NOTES
Spiritual Care Note    Spiritual Assessment:   referred to patient's wife via [facility] nurse. Wife in the [facility] waiting room when  approached her for a visit. Wife seems at ease as she talks about getting a good report from surgery and feeling good about waiting by herself today as her husbands surgeon instilled a good trust and confidence in her. Wife seems relieved to have her  past surgery, has good support from a loving family and thanked the  for checking in with her.     Care Provided: Listening and support provided.     Plan of Care: Spiritual care will continue to follow as part of patient's care team.     QING Silva, BCC

## 2021-05-28 NOTE — PROGRESS NOTES
ITP ASSESSMENT   Assessment Day: Initial    Session Number: 1  Precautions: Surgical s/p CABGx3 and vein harvest of LLE, [place], Falls Risk, Neck, Back pain with sciatic going into the right leg, FONSECA, Inguinal Hernia    Diagnosis: CAB (CABGx3, Ligation of JUDY (5/1/19))    Risk Stratification: High (Smokes cigars)    Referring Provider: Dr.S. Bai   ITP: Dr. Arango  EXERCISE  Exercise Assessment: Initial       6 Minute Walk Test   Pre   Pre Exercise HR: 59-65                    Pre Exercise BP: 102/68      Peak  Peak HR:                    Peak BP: 134/60    Peak feet: 1000    Peak O2 SAT: 100    Peak RPE: 13-14    Peak MPH: 1.89      Symptoms:  Peak Symptoms: SOB      5 mins. Post  5 Min Post HR: 85-92    5 Min Post BP: 112/64                           Exercise Plan  Goals Next 30 days  ADL'S: Resume driving after surgical restrictions at 4 weeks. Resume dressing/bathing/showering/toweling off for himself without assistance (2-4METs)    Leisure: Resume climbing 1-flight of 14 stairs without SOB. Use stairs in CR to mock similar level ~4METs with O2 WNL.    Work: Retired      Education Goals: Patient can state cardiac s/s and appropriate emergency response. (Pt is resistant to calling 9-1-1 or going to ER. )    Education Goals Met: Has system for taking medication.;Medication review. (Continue to try and provide education to patient.)      Exercise Prescription  Exercise Mode: Treadmill;Bike;Nustep;Arm Erg.;Hallway Walking;Stairs (Arm use pending s/p 6wks: 6/12/19)    Frequency: 2x/week    Duration: 30-60minutes    Intensity / THR: 20-30 beats above resting heart rate    RPE 11-14  Progression / Met level: 2.4-4+    Resistive Training?: Yes      Current Exercise (mins/week): 1      Interventions  Home Exercise:  Mode: Walking    Frequency: 2-3x/week    Duration: 15' 1-2x/day on non rehab days      Education Material : Educational videos;Provide written material;Individual education and counseling;Offer  "educational classes (Written home exercise program. )      Education Completed  Exercise Education Completed: Cardiac Anatomy;Signs and Symptoms;Medication review;RPE;Emergency Plan;Home Exercise;Warm up/cool down;FITT Principles;Stretching;BP/HR Reponse to exercise;Strength training;Benefits of Exercise;End point of exercise              Exercise Follow-up/Discharge  Follow up/Discharge: Pt is not interested in aerobic exercise at all. Continue to encourage patient to exercise on non rehab days at this time due to benefits of exercise to overall help with his LTG of playing softball again. Pt was advised not to play softball for 3mo post op by the CV team. Pt will be given a patient treatment plan that summarizes pt's goals, CRF and home ex program.    NUTRITION  Nutrition Assessment: Initial      Nutrition Risk Factors:  Nutrition Risk Factors: Dyslipidemia;Overweight (\"Mixed Hyperlipidemia\")  Cholesterol: (4/22/19) 135  LDL: 78  HDL: 42  Triglycerides: 78      Nutrition Plan  Interventions  Diet Consult: NA (It is scheduled with wife only-she is main cook. )    Other Nutrition Intervention: Diet Class;Therapist/Pt Discussion;Provide with Written Material    Initial Rate Your Plate Score: 44      Education Completed  Nutrition Education Completed: Risk factor overview      Goals  Nutrition Goals (Next 30 days): Patient can identify their risk factors for CAD;Patient will follow a low sodium diet;Patient knows appropriate portion size;Patient will follow a low saturated fat diet (Pt was not keen on setting goals-wife is helping. )      Goals Met  Nutrition Goals Met: Reviewed Dietitian schedule;Provided Rate your Plate Survey;Completed Nutritional Risk Screen      Height, Weight, and  BMI  Weight: 225 lb 12.8 oz (102.4 kg)  Height: 6' (1.829 m)  BMI: 30.62      Nutrition Follow-up  Follow-up/Discharge: Pt does not care to set goals but his wife is willing and wanting to make some diet changes in his diet to help " "reduce CRF. Pt is scheduled to meet with dietician on June 3rd. She has completed survey today on diet habits and now knows some of the changes she needs to make to follow lower saturated fats, cholesterol and lower sodium. His wife was a Home Economic Teacher and she knows guidelines but would like to more specific details from a dietician in cardiac rehab. Pt initially is not ready to set a weight loss goal.         Other Risk Factors  Other Risk Factor Assessment: Initial      HTN Risk Factor: Hypertension (Pt denies this CRF-he has never had this problem before)      Pre Exercise BP: 102/68  Post Exercise BP: 112/64      Hypertension Plan  Goals  HTN Goals: Exercises regularly;Follow low sodium diet;Take medication as prescribed (Continue to take Metropolol to help mange his HTN)      Goals Met  HTN Goals Met: Take medication as prescribed      HTN Interventions  HTN Interventions: Diet consult;Therapist/patient discussion;Provide written material;Offer educational classes      HTN Education Completed  HTN Education Completed: Medication review;Risk factor overview      Tobacco Risk Factor: Tobacco      Initial Use:: 1 Cigar Daily  Current Use:: 1 Cigar Daily      Tobacco Plan  Tobacco Goals  Tobacco Goals: Patient not ready to change      Goals Met  Tobacco Goals Met: patient not ready to change      Tobacco Interventions  Tobacco Interventions: Therapist/patient discussion      Tobacco Education Completed  Tobacco Education Completed: Risk factor overview      Risk Factor Follow-up   Follow-up/Discharge: CRF: Family Hx, HTN-pt denies, CHOL-pt denies, Tobacco-unwillingness to change this CRF, Inactivity-Unwillingness to change, Overweight-pt states-\"if you say so\". Note in the education section -pt is resistant to education and impatient. We will continue to try an provide education when pt is willing. He does like to read so education folder and education material given. Next session pt will receive a Pt Tx " Plan with Plan of care and CRF noted. It was reviewed to modify CRF to exercise, eat heart healthy and take medications as prescribed. Continue to provide encouragement for patient.      PSYCHOSOCIAL  Psychosocial Assessment: Initial       [place] [employer] Q of L Summary Score: 28      BEBO-D Score: 8      Psychosocial Risk Factor: NA (Pt denies stress. )      Psychosocial Plan  Interventions  If BEBO-D > 15 send letter to MD  Interventions: Offer Spiritual Care consult;Offer educational videos and classes;Provide written material;Individual education and counseling      Education Completed  Education Completed: Effects of stress on body;S/S of depression;Relaxation/Coping Techniques      Goals  Goals (Next 30 days): Oriented to stress management classes;Improvement in [place] score;Patient demonstrates understanding of stress, no goals identified for the next 30 days      Goals Met  Goals Met: Identified Support system;Identify stressors;Practicing stress management skills      Psychosocial Follow-up  Follow-up/Discharge: Pt overall does not feel that stress is a concern for him. He however noted that he is not interested in education and impatient. Pt stated that if something is bothering him that he just yells at his wife-wife is present (she states however she does feel safe). When patient was addressed about what he enjoys that is relaxing he states he enjoys reading, watching TV. His support person is his wife-she helps with everything.            Patient involved in Goal setting?: Yes (Pt and wife are helping set goals. Pt somewhat resistant. )

## 2021-05-28 NOTE — PLAN OF CARE
"  Patient Name: Giacomo Schneider .   MRN: [id number]   Date of Admission: 5/1/2019    Procedure: CORONARY ARTERY BYPASS X 3 ENDOSCOPIC VEIN HARVEST, INTERNAL MAMMARY ARTERY, ANESTHESIA TRANSESOPHAGEAL ECHOCARDIOGRAM, LEFT ATRIAL APPENDAGE LIGATION AND EPI AORTIC ULTRASOUND    Post Op day #:3    Subjective (Patient focus/Primary Problem for shift): \" I couldn't sleep last night, I was uncomfortable\"          Pain Goal0 Pain Rating2-3           Pain Medication/ Regime effective to reduce patient pain Scheduled and PRN pain meds.     Objective (Physical assessment):           Rhythm: atrial fibrillation            Bowel Activity: yes if Yes indicate when: 5/4          Bowel Medications: yes            Incision: healing well          Incentive Spirometry Q 1-2 hour when awake:  yes Volume: 900          Epicardial Pacing Wires:  not applicable            Patient Activity:           Up to chair for meals: yes          Ambulation with RN x2 (Not including CR): no            Is patient in home clothes:no           Assessment (Nursing primary shift focus): Alert and oriented X3. C/O incisional pain, administered scheduled and prn meds with effective pain relief. On RA. VSS. Had a bm today. Borges out. Adequate intake and output. Tele: Controlled afib, HR 80's to 90's.    Vital signs stable. /68 (Patient Position: Sitting)   Pulse (!) 101   Temp 97.6  F (36.4  C) (Oral)   Resp 24   Ht 6' (1.829 m)   Wt (!) 236 lb 6.4 oz (107.2 kg)   SpO2 96%   BMI 32.06 kg/m      Plan (Patient Care Plan/focus): Pain control, IS use and ambulation.       Suni Monahan   5/4/2019   3:22 PM            "

## 2021-05-28 NOTE — PLAN OF CARE
Problem: Ineffective Airway Clearance  Goal: Maintain airway patency  Outcome: Progressing     Problem: Breathing  Goal: Patient will utilize incentive spirometer  Outcome: Progressing     KIERSTEN Cleary, 5/2/2019

## 2021-05-28 NOTE — ANESTHESIA POSTPROCEDURE EVALUATION
Patient: Giacomo Quezadajanae Wen.  CORONARY ARTERY BYPASS X 3 ENDOSCOPIC VEIN HARVEST, INTERNAL MAMMARY ARTERY, ANESTHESIA TRANSESOPHAGEAL ECHOCARDIOGRAM, LEFT ATRIAL APPENDAGE LIGATION AND EPI AORTIC ULTRASOUND  Anesthesia type: general    Patient location: ICU  Last vitals:   Vitals Value Taken Time   /59 5/1/2019 11:59 AM   Temp 36.9  C (98.4  F) 5/1/2019  3:00 PM   Pulse 81 5/1/2019  6:02 PM   Resp 16 5/1/2019 11:59 AM   SpO2 100 % 5/1/2019  6:02 PM   Vitals shown include unvalidated device data.  Post vital signs: stable on Epi 0.01  Level of consciousness: sedated  Post-anesthesia pain: pain controlled  Post-anesthesia nausea and vomiting: N/A  Pulmonary: ETT, ventilator  Cardiovascular: stable on reducing doses of epi 0.01, CCI 2.3, MAPs 70s and stable  Hydration: adequate  Anesthetic events: no    QCDR Measures:  ASA# 11 - Marla-op Cardiac Arrest: ASA11B - Patient did NOT experience unanticipated cardiac arrest  ASA# 12 - Marla-op Mortality Rate: ASA12B - Patient did NOT die  ASA# 13 - PACU Re-Intubation Rate: ASA13X - Exclusion: organ donor or direct ICU transfer  ASA# 10 - Composite Anes Safety: ASA10A - No serious adverse event    Additional Notes:

## 2021-05-28 NOTE — PROGRESS NOTES
Pt was seen by Cardiac Rehab Staff for Pre-Op orientation to Inpatient Cardiac Rehab. Reviewed sternal precautions and functional mobility techniques for post-op recovery.  Patient plans home with wife and [facility] at [facility].  Raine Madden OTR/L   4/30/2019

## 2021-05-28 NOTE — CONSULTS
Clinical Nutrition Therapy Assessment Note      Reason for Assessment:   Giacomo Schneider . is a 74 y.o. male assessed by the RD for consult and protocol/pathway order.    Principal Problem:    S/P CABG (coronary artery bypass graft)  Active Problems:    Atrial fibrillation (H)    ARF (acute respiratory failure) (H)    Anemia due to blood loss, acute  Hx impaired fasting glucose.    Subjective:  Pt interested in ordering some broth for supper.  Chart reviewed    Nutrition History:  Information from patient and chart.  Diet prior to admission:  general.  Recent food/fluid intake: good.   Food allergies or intolerances: nkfa    Nutrition Prescription:   Diet: CL, diabetic, SCIP    Current Nutrition Intake:  Minimal intake since surgery    Anthropometrics:  Height: 6' (182.9 cm)  Admission weight: 228  Weight: (!) 236 lb 11.2 oz (107.4 kg)  BMI (Calculated): 30.9  BMI indication: 30-34.9 obesity (class 1)  Ideal body weight 178#+-10%  Weight History:    Wt Readings from Last 3 Encounters:   05/02/19 (!) 236 lb 11.2 oz (107.4 kg)   04/30/19 (!) 233 lb 11 oz (106 kg)   04/30/19 (!) 231 lb 14.4 oz (105.2 kg)     Physical Findings:  Not found     GI Status/Output:   GI symptoms per nursing:   Last BM recorded: n/a    Skin/Wound:   Jayesh Scale Score: 17    Medications:  Medications reviewed.    Labs:  Lab Results   Component Value Date    HGBA1C 6.3 (H) 04/30/2019   Labs reviewed    Estimated Nutrition Needs:  Using ideal weight of 81 kg.    Energy Needs: 7539-4732 kcals daily per 25-30 kcal/kg   Protein Needs:  g daily, 1.2-1.5 g/kg.    Malnutrition: Not noted    Nutrition Risk Level: low risk    Nutrition DX: nutrition knowledge deficit r/t new DX evidenced by surgery    Goals:  Participate in diet ed    Education needs:  Cardiac diet    Plan:  Diet ed before d/c    Monitor:   Diet advance, education readiness    See Care Plan for Problems, Goals, and Interventions.

## 2021-05-28 NOTE — PLAN OF CARE
Problem: Pain  Goal: Patient's pain/discomfort is manageable  Outcome: Progressing     Problem: Safety  Goal: Patient will be injury free during hospitalization  Outcome: Progressing     Problem: Psychosocial Needs  Goal: Demonstrates ability to cope with hospitalization/illness  Outcome: Progressing   Slept most of the night. VSS. AFIB with controlled Hr. Bleeding from hemorrhoids, patient states that it is not new. Last Hg FROM 5/2 10.8. Sticky note left for the doctor. Will continue to monitor

## 2021-05-28 NOTE — PLAN OF CARE
Care Plan  Care Management        Care Management Goals of the Day: Progression of care     Care Progression Reviewed With: Charge RN, MD, HUC, RNCM     Barriers to Discharge: PODx4, duque, IV lasix     Discharge Disposition:home     Expected Discharge Date:5/7/2019     Transportation: family        Care Coordination Narrative:  Pt lives at home with his wife, is independent at baseline. He plans on discharging home with outpatient cardiac rehab at [facility]. CM to follow and assist as needed.

## 2021-05-28 NOTE — ANESTHESIA PROCEDURE NOTES
Arterial Line  Reason for Procedure: hemodynamic monitoring  Patient location during procedure: Pre-op  Start time: 5/1/2019 7:05 AM  End time: 5/1/2019 7:10 AM  Staffing:  Performing  Anesthesiologist: Ramana West MD  Sterile Precautions:  sterile barriers used during insertion: cap, mask, sterile gloves, large sheet, and hand hygiene used.  Arterial Line:   Immediately prior to procedure a time out was called to verify the correct patient, procedure, equipment, support staff and site/side marked as required  Laterality: right  Location: radial  Prepped with: ChloroPrep    Needle gauge: 20 G  Number of Attempts: 1  Secured with: tape  Flushed with: saline  1% lidocaine local anesthesia used for skin prep.   See MAR for additional medications given.  Ultrasound evaluation of access site: yes  Vessel patent by US exam    Concurrent real time visualization of needle entry

## 2021-05-28 NOTE — PROGRESS NOTES
Paged KOLBY Clarke.: Amiodarone. : Per MD Stop Amiodarone drip now and   start Amiodarone 200 mg two times a day tonight.

## 2021-05-28 NOTE — PLAN OF CARE
Care Plan  Care Management      Care Management Goals of the Day: Assessment/Discharge Plan    Care Progression Reviewed With: Charge RN, MD, [facility], CMSW    Barriers to Discharge:CABG x's 3 POD 1, IV Lasix, Chest Tubes    Discharge Disposition: Home    Expected Discharge Date: 5/6/19    Transportation: Family      Care Coordination Narrative: Spoke with patient.  He lives at home with his wife and is independent at baseline.  He plans on discharging home with outpatient cardiac rehab at [facility].  Advanced Directive in chart but not valid as it was witnessed by two employees.

## 2021-05-28 NOTE — INTERVAL H&P NOTE
SEDATION ASSESSMENT:       History of medication allergies : (See Med. Reconcilliation form): Yes  History of malignant hyperthermia: : No  History of Family sedation/anesthesia adverse reactions:: No  History of Other adverse reaction to sedation to sedation/anesthesia:: No  History of sleep apnea/other respiratory problems:: No      PHYSICIAN SEDATION ASSESSMENT:  Difficult Airway? : Yes(Obese)  Plan for Sedation: Moderate  Assessment reviewed by proceduralist: Yes  Update H&P: Completed Updated H&P in Update H&P Note section      COMMENTS:         Giacomo Schneider Sr.    04/22/19    Patient presents for coronary angiogram.      I have performed an assessment and examined the patient, as necessary, to update the patient's current status that may have changed since the prior History and Physical.  The History & Physical has been reviewed and the patient's status is unchanged.  Questions regarding the patient's upcoming procedure were answered.  Risks benefits and expected outcomes were reviewed and the patient agrees to proceed.

## 2021-05-28 NOTE — PLAN OF CARE
Problem: Mechanical Ventilation  Goal: Patient will maintain patent airway  Outcome: Progressing  Goal: Oral health is maintained or improved  Outcome: Progressing  Goal: Respiratory status - ventilation  Description  Movement of air in and out of the lungs.    Liberate from ventilator  Outcome: Progressing  Goal: ET tube will be managed safely  Outcome: Progressing     Vent Mode: VCV  FiO2 (%):  [100 %] 100 %  S RR:  [16] 16  S VT:  [600 mL] 600 mL  PEEP/CPAP (cm H2O):  [5 cm H2O] 5 cm H2O  Minute Ventilation (L/min):  [12.2 L/min] 12.2 L/min  PIP:  [33 cm H2O] 33 cm H2O  MAP (cm H2O):  [10] 10     Pt admitted to ICU s/p CABG x 3 around 1150. Pt was placed on mechanical ventilator support with the above settings.  ETT size 7.5 secured 23 cm at the teeth.  Breath sounds coarse bilaterally.  Minimal amount of white secretions via inline suction. ABG result via above settings was 7.46/29/258/23.1/99.5. RN and CNP notified in person. Rt will continue to assess for readiness to wean and extubate as appropriate.    KIERSTEN Asher

## 2021-05-28 NOTE — ANESTHESIA CARE TRANSFER NOTE
Last vitals:   Vitals:    05/01/19 1159   BP: 104/59   Pulse: 89   Resp: 16   Temp: 36.6  C (97.9  F)   SpO2: 100%     Patient's level of consciousness is unresponsive and pt intubated/sedated  Spontaneous respirations: no: pt intubated/sedated  Maintains airway independently: no: pt intubated/sedated  Dentition unchanged: yes  Oropharynx: endotracheal tube in place    QCDR Measures:  ASA# 20 - Surgical Safety Checklist: WHO surgical safety checklist completed prior to induction    PQRS# 430 - Adult PONV Prevention: 4558F-1P - Medical reason for NOT administering combination therapy  ASA# 8 - Peds PONV Prevention: NA - Not pediatric patient, not GA or 2 or more risk factors NOT present  PQRS# 424 - Marla-op Temp Management: 4559F - At least one body temp DOCUMENTED => 35.5C or 95.9F within required timeframe  PQRS# 426 - PACU Transfer Protocol: - Transfer of care checklist used  ASA# 14 - Acute Post-op Pain: ASA14B - Patient did NOT experience pain >= 7 out of 10     Pt transported to ICU with Anesthesiologist, CRNA, Line tech, Circulator, Surgeon, and Fellow. VSS throughout. All lines functional and intact on arrival to unit. Report given to RN, all questions answered.

## 2021-05-28 NOTE — PLAN OF CARE
Appears calm and voices no concerns to writer.Daily POC discussed with pt who verbs understanding of and agreement with same inhaled corticosteroids to transfer to 400 this shift.Appears calm and voices no concerns to writer.Steady on feet using pushing wheelchair for CT transport with safe technique and appears safety aware

## 2021-05-28 NOTE — PROGRESS NOTES
Respiratory Care Note    Attempted to wean pt x3. Pt failed all three attempts. RR in excess of 40, VT varied between  ml, RSBI in excess of 200. Pt returned to full vent support on the settings below:    Vent Mode: VCV  FiO2 (%):  [40 %-100 %] 40 %  S RR:  [16] 16  S VT:  [600 mL] 600 mL  PEEP/CPAP (cm H2O):  [5 cm H2O] 5 cm H2O  Minute Ventilation (L/min):  [12.2 L/min-14.1 L/min] 14.1 L/min  PIP:  [30 cm H2O-33 cm H2O] 30 cm H2O  MAP (cm H2O):  [10-11] 11    Will attempt SBT as able. RT will continue to follow.     DMITRY ZaldivarT

## 2021-05-28 NOTE — PLAN OF CARE
Problem: Pain  Goal: Patient's pain/discomfort is manageable  Outcome: Progressing       Problem: POD 3  Goal: POD 1 Mobility/activity advancement  Outcome: Progressing     Problem: POD 3  Goal: Patients nutritional intake is adequate and patient will participate in bowel management program  Outcome: Progressing      Patient is alert and oriented but anxious early of the shift.   Incisional site intact, clean and no bleeding noted, [place] sites are open to air, clean and intact. Pulses are present.  Had a BM today, duque cath is intact and no leakage noted.  Scheduled pain meds was helpful.  Will continue to monitor  /72 (Patient Position: Lying)   Pulse 92   Temp 97.2  F (36.2  C) (Oral)   Resp 18   Ht 6' (1.829 m)   Wt (!) 236 lb 6.4 oz (107.2 kg)   SpO2 96%   BMI 32.06 kg/m

## 2021-05-28 NOTE — PROGRESS NOTES
RT Heart Teaching Worksheet       Date of Surgery 05/01/19          Date Taught  04/30/19  Time of Surgery 0730       Surgeon Dr. Bai    IS 2500 ml Achieved        IS 2550 ml Predicted        Height 73 in.      History : Smoker ?:YES   Quit ? :  NO           When?:NA                        History: COPD ?:NO                                       Asthma ? : NO               CARMITA ? : NO               Home Machine ? : NA                                         What Meds if Lung History?: NA    Items to discuss with Patient : (done or not done)     Heart Pillow/ Coughing: DONE  Flutter Valve: DONE  Questions Answered:DONE    Charting that needs to be done: (done or not done)    IS charted in Flowsheet DONE  IS achieved placed in patient binder DONE  Education Charted in Ed Activity DONE  Charges/productivity DONE      Yannick Shaikh, DMITRYT

## 2021-05-28 NOTE — PROGRESS NOTES
Cardiac Rehab  Phase II Assessment    Assessment Date: 5/14/2019      Diagnosis: CABGx3, Ligation Left Atrial Appendage   Date of Onset: 5/1/19  Procedure: CABGx3: LIMA to LAD, rSVG-medial diagonal LAD, and RPDA  Date of Onset: 5/1/19  ICD/Pacemaker: No Parameters: N/A  Post-op Complications: ARF, Anemia  ECG History: Chronic A-Fib, SR with fusion complexes EF%:*55  Past Medical History:   Past Medical History:   Diagnosis Date     Atrial fibrillation (H)      BPH (benign prostatic hyperplasia)      CAD (coronary artery disease)      Cancer (H)     basal cell carcinoma of skin     Coronary atherosclerosis      Diverticulosis      GERD (gastroesophageal reflux disease)      H/O cardiovascular stress test     negative 2012     Hyperlipemia      Impaired fasting glucose      Low back pain      Sleep apnea     unable to tolerate CPAP     Spermatocele      Patient Active Problem List   Diagnosis     Mixed hyperlipidemia     Atrial fibrillation (H)     Adult Sleep Apnea     Esophageal Reflux     Bilateral inguinal hernia     Essential hypertension     FONSECA (dyspnea on exertion)     Abnormal cardiovascular stress test     Abnormal computed tomography angiography (CTA)     Elevated coronary artery calcium score     S/P CABG (coronary artery bypass graft)     ARF (acute respiratory failure) (H)     Anemia due to blood loss, acute     Past Surgical History:   Procedure Laterality Date     CV CORONARY ANGIOGRAM N/A 4/22/2019    Procedure: Coronary Angiogram;  Surgeon: Qamar Martin MD;  Location: [facility];  Service: Cardiology     LAPAROSCOPIC INGUINAL HERNIA REPAIR Bilateral 12/3/2015    Procedure: LAPAROSCOPIC BILATERAL INGUINAL HERNIA REPAIR WITH BILATERAL MESH;  Surgeon: Bran Puckett MD;  Location: [facility];  Service:      Social History     Socioeconomic History     Marital status:      Spouse name: Not on file     Number of children: Not on file     Years of education: Not on  file     Highest education level: Not on file   Occupational History     Not on file   Social Needs     Financial resource strain: Not on file     Food insecurity:     Worry: Not on file     Inability: Not on file     Transportation needs:     Medical: Not on file     Non-medical: Not on file   Tobacco Use     Smoking status: Light Tobacco Smoker     Types: Cigars     Smokeless tobacco: Current User     Types: Chew     Tobacco comment: 1 cigar per day   Substance and Sexual Activity     Alcohol use: Yes     Comment: rarely     Drug use: No     Sexual activity: Not on file   Lifestyle     Physical activity:     Days per week: Not on file     Minutes per session: Not on file     Stress: Not on file   Relationships     Social connections:     Talks on phone: Not on file     Gets together: Not on file     Attends Confucianism service: Not on file     Active member of club or organization: Not on file     Attends meetings of clubs or organizations: Not on file     Relationship status: Not on file     Intimate partner violence:     Fear of current or ex partner: Not on file     Emotionally abused: Not on file     Physically abused: Not on file     Forced sexual activity: Not on file   Other Topics Concern     Not on file   Social History Narrative     Not on file     Physical Assessment  Precautions/ Physical Limitations: Sternal and LLE surgical precautions, FONSECA, Inguinal Hernia  Oxygen: No  O2 Sats: 98 Lung Sounds: clear Edema: none  Incisions: healing well no drainage  Sleeping Pattern: fair -pt uses Tylenol PM, he has Sleep Apnea  Appetite: good   Nutrition Risk Screen: Pt has no goals with weight at this time    Pain  Location: -  Characteristics:-  Intensity: (0-10 scale) 0  Current Pain Management: -  Intervention: -  Response: -    Psychosocial/ Emotional Health  1. In the past 12 months, have you been in a relationship where you have been abused physically, emotionally, sexually or financially? No   notified: NA  2. Who do you turn to for emotional support?: wife  3. Do you have cultural or spiritual needs? No  4. Have there been any major life changes in the past 12 months? Yes Heart Surgery    Referral Information  Primary Physician: Giovani Ponce MD  Cardiologist: Dr. Vee  Surgeon: STEPHANIE Blair exercise/Equipment: none    Patient's long-term goal(s): Play Softball, use chain saw and climb ladder, increase strength and stamina and get back to life    1. Living Accommodations: Home Steps: Yes-14 stairs      Support people at home: wife   2. Marital Status:   3. Family is able to assist with cares      Caodaism/Community involvement: pt did not want to address, does not volunteer  4. Recreation/Hobbies: Play soft ball

## 2021-05-28 NOTE — PROGRESS NOTES
CVTS Daily Progress Note   5/2/2019   POD #1 s/p CABG x 3 LAAL  DOS 5/1/10- Dr. Bai   LOS: 1 day   EF 55%  A1C 6.3 %  Patient arrived to ICU from OR yesterday afternoon. He was subsequently extubated and weaned from pressors.     Overnight events:  No acute events overnight.     Patient progressing on path   Maintaining oxygen saturations on 2L/nc.   Normotensive.    Pain controlled: oxycodone/dilaudid/tramadol  -BM / flatus.   Tolerating diet clear liquids, no nausea.   UOP adequate.   Hgb 10.8  .   Patient denies new chest pain, shortness of breath, abdominal pain, calf pain, nausea. Patient main concern today: wants to know when duque can come out.\  Discussed plan of care regarding CT, duque and transfer to tele later today.      PLAN  Diuresis  Kdur   Replace mag  Metoprolol  Transfer out to tele later this morning    OBJECTIVE:    Temp:  [97.9  F (36.6  C)-101.9  F (38.8  C)] 100.2  F (37.9  C)  Heart Rate:  [76-94] 76  Resp:  [16-28] 16  BP: (102-126)/(56-73) 126/69  Arterial Line BP: ()/() 116/62  FiO2 (%):  [40 %-100 %] 40 %  Wt Readings from Last 2 Encounters:   05/02/19 0500 (!) 236 lb 11.2 oz (107.4 kg)   05/01/19 0709 (!) 228 lb 1.6 oz (103.5 kg)   04/30/19 0957 (!) 233 lb 11 oz (106 kg)       Current Medications:    Scheduled Meds:    acetaminophen  650 mg Oral Q6H    Or     acetaminophen  650 mg Rectal Q6H     aspirin  81 mg Oral DAILY     atorvastatin  40 mg Oral QHS     [START ON 5/4/2019] bisacodyl  10 mg Rectal Once     docusate sodium  100 mg Oral BID     furosemide  40 mg Intravenous TID     heparin (PF)  5,000 Units Subcutaneous Q8H FIXED TIMES     insulin aspart (NovoLOG) injection   Subcutaneous TID with meals     insulin aspart (NovoLOG) injection   Subcutaneous QHS     insulin glargine  10 Units Subcutaneous STAT x1     [START ON 5/3/2019] magnesium hydroxide  30 mL Oral DAILY     magnesium sulfate IVPB  2 g Intravenous Once     melatonin  3 mg Oral QHS     metoprolol  tartrate  12.5 mg Oral BID     metoprolol tartrate  12.5 mg Oral 0330, 1300, 1700 - CV Metoprolol     metroNIDAZOLE  1 application Topical BID     omeprazole  20 mg Oral QAM AC     polyethylene glycol  17 g Oral DAILY     potassium chloride ER  20 mEq Oral BID     Continuous Infusions:    DOPamine       epinephrine Stopped (05/02/19 0400)     insulin infusion (1 unit/mL) 0.8 Units/hr (05/02/19 0901)     niCARdipine       norepinephrine IV infusion in D5W       sodium chloride 0.9% 50 mL/hr (05/02/19 0800)     sodium chloride 0.9% 25 mL/hr (05/02/19 0054)     vasopressin       PRN Meds:.acetaminophen, acetaminophen, albumin human, aluminum-magnesium hydroxide-simethicone, [START ON 5/3/2019] bisacodyl, [START ON 5/4/2019] bisacodyl, calcium  gluconate IVPB, dextrose 50 % (D50W), DOPamine, epinephrine, glucagon (human recombinant), HYDROmorphone, [START ON 5/3/2019] magnesium hydroxide, naloxone **OR** naloxone, niCARdipine, norepinephrine IV infusion in D5W, ondansetron, oxyCODONE, sodium chloride, sodium phosphates 133 mL, traZODone, vasopressin    Cardiographics:    Telemetry monitoring demonstrates SR with rates in the 77 with short burst of PAT noted x 1 per my personal review.    Imaging:    Xr Chest 1 View Portable    Result Date: 5/2/2019  EXAM: XR CHEST 1 VIEW PORTABLE LOCATION: [facility] DATE/TIME: 5/2/2019 5:43 AM INDICATION: ICU pt, stable with no clinical status changes post op cardiac surgery post op cardiac surgery COMPARISON: 5/1/2019 FINDINGS: Interval extubation. Remaining tubes and catheters are in good position. Mild atelectasis at left lung base with lungs otherwise clear. No pneumothorax. Mild cardiomegaly stable, sternal wires present.    Xr Chest 1 View Portable    Result Date: 5/1/2019  EXAM: XR CHEST 1 VIEW PORTABLE LOCATION: [facility] DATE/TIME: 5/1/2019 12:35 PM INDICATION: ICU pt, stable with no clinical status changes post op cardiac surgery post op cardiac surgery  COMPARISON: 4/30/2019. FINDINGS: Patient has undergone interval sternotomy and coronary artery bypass grafting. Endotracheal tube in satisfactory position terminating 3.9 cm above the lea. Right internal jugular venous [place] catheter tip in the right pulmonary artery. Mediastinal and left pleural drains. Small left pleural effusion and left basilar atelectasis. Mild cardiomegaly. Normal pulmonary vascularity. No pneumothorax.      Lab Results (most recent, reviewed):    Hemoglobin (g/dL)   Date Value   05/02/2019 10.8 (L)     WBC (thou/uL)   Date Value   05/02/2019 9.6     Potassium (mmol/L)   Date Value   05/02/2019 4.3     Creatinine (mg/dL)   Date Value   05/02/2019 1.07     Hemoglobin A1c (%)   Date Value   04/30/2019 6.3 (H)     Magnesium (mg/dL)   Date Value   05/02/2019 2.0     Calcium (mg/dL)   Date Value   05/02/2019 8.9       (!) 236 lb 11.2 oz (107.4 kg)  Admit weight      UOP: 1.7 L/24 hours  CT: 677cc/24 hours    Physical Exam:    General: Patient seen  up in chair .    NAD. Conversant. Pleasant.   CV: RRR on monitor.   Pulm: Non-labored effort on 2L/nc.   Chest tubes in place, serosanguinous output, no air leak.   Sternal and left knee SVG  Incision  C/D/I.  Abd: Soft, NT, ND, no flatus  : Borges with elma urine   Ext: Mild pedal edema, SCDs in place, warm  Neuro: CNs grossly intact. Equal hand grasps, DELONG       ASSESSMENT/PLAN:    Giacomo Quezadajanae Wen. is a 74 y.o. male with a history of CAD, atrial fib who is now s/p CABG and LAAL.    Principal Problem:    S/P CABG (coronary artery bypass graft)  Active Problems:    Atrial fibrillation (H)    ARF (acute respiratory failure) (H)    Anemia due to blood loss, acute        NEURO:   - Scheduled Tylenol and PRN oxycodone/tramadol for pain  - Melatonin QHS    CV:   - Normotensive  - BB Metoprolol 12.5 mg two times a day  - CV BB protocol  - ASA 81mg  - Atorvastatin 80mg daily- increased to high dose statin  - Chest tubes to remain today    PULM:   -  Maintaining oxygen saturations on 2L/nc  - Encourage pulmonary toilet  - IS 1200, using flutter valve, using pillow    FEN/GI:  - No BM, No flatus  - Continue electrolyte replacement protocol  - Diet: Cardiac, ADAT   - Bowel regimen    RENAL:  - Adequate UOP/hr. Continue to monitor closely via duque.   - Cr 1.07  - Duque to remain in for close monitoring of I/O and during period of diuresis/relative immobility.   - Diuresis with 40mg IV Lasix three times a day  - Kdur 20 two times a day    HEME:  - Acute blood loss anemia post-op.   - Hgb stable, no bleeding concerns. Hep SQ, [id number]    ID:  - Marla op ppx complete, afebrile . No concerns for infection    ENDO:     - Transition to SSI    PPx:   - DVT: SCDs, SQ heparin three times a day   - GI: Omeprazole   - Ambulating with therapy CR to see today.    DISPO:   - Transfer to general telemetry floor     Patient seen and discussed with Dr. Reid.

## 2021-05-28 NOTE — INTERVAL H&P NOTE
I have performed an assessment and examined the patient, as necessary, to update the patient's current status that may have changed since the prior History and Physical.    There has been no interval change in history or physical examination.  The patient is ready to undergo CABG.  Informed consent has been obtained.

## 2021-05-28 NOTE — PLAN OF CARE
Patient Name: Giacomo Schneider .   MRN: [id number]   Date of Admission: 5/1/2019    Procedure: CORONARY ARTERY BYPASS X 3 ENDOSCOPIC VEIN HARVEST, INTERNAL MAMMARY ARTERY, ANESTHESIA TRANSESOPHAGEAL ECHOCARDIOGRAM, LEFT ATRIAL APPENDAGE LIGATION AND EPI AORTIC ULTRASOUND    Post Op day #:5    Subjective         Pain Goal0 Pain Rating1-5           Pain Medication/ Regime effective to reduce patient pain Scheduled tylenol and prn tramadol effective for pain management.     Objective (Physical assessment):           Rhythm: atrial fibrillation            Bowel Activity: yes if Yes indicate when: 5/5          Bowel Medications: no            Incision: healing well          Incentive Spirometry Q 1-2 hour when awake:  yes Volume: 1000          Epicardial Pacing Wires:  not applicable            Patient Activity:           Up to chair for meals: yes          Ambulation with RN x2 (Not including CR): yes            Is patient in home clothes:no           Assessment (Nursing primary shift focus): Alert and oriented X3. Administered scheduled and prn tramadol for incisional pain with effective relief. Was anxious and frustrated due to urinating frequently.  On RA. IS upto 1000. Showered and had a BM. Adequate intake/output. Assist X1. Tele: Controlled afib, HR 70- 80's. Anticipating DC tomorrow.     Vital signs stable. /67 (Patient Position: Sitting)   Pulse 77   Temp 97.5  F (36.4  C) (Oral)   Resp 18   Ht 6' (1.829 m)   Wt (!) 227 lb 11.2 oz (103.3 kg)   SpO2 96%   BMI 30.88 kg/m      Plan (Patient Care Plan/focus): IS use, pain control and ambulation.       Suni Monahan   5/5/2019   5:53 PM

## 2021-05-28 NOTE — PLAN OF CARE
Care Plan  Care Management      Care Management Goals of the Day: Progression of care    Care Progression Reviewed With: Charge RN, MD, HUC, RNCM    Barriers to Discharge: PODx3, duque, IV lasix    Discharge Disposition:home    Expected Discharge Date:5/6/2019    Transportation: family      Care Coordination Narrative:  Pt lives at home with his wife, is independent at baseline. He plans on discharging home with outpatient cardiac rehab at [facility]. CM to follow and assist as needed.

## 2021-05-29 ENCOUNTER — RECORDS - HEALTHEAST (OUTPATIENT)
Dept: ADMINISTRATIVE | Facility: CLINIC | Age: 76
End: 2021-05-29

## 2021-05-29 NOTE — PATIENT INSTRUCTIONS - HE
Giacomo Schneider Sr.,    It was a pleasure to see you today at the [facility].     My recommendations after this visit include:    - stop Amiodarone    - decrease the metoprolol to 25 mg twice daily until you see Dr. Vee, then he can re-evaluate the dose    - have NATALIO (echo) to evaluate the left atrial appendage.  If it is properly closed, then you could potentially come off the Eliqus (that would be a decision between you and Dr. Vee)    - okay to resume vitamin C, vitamin E, multivitamin, glucosamine/chondroitin, omega 3, CECY-E and Calcium supplements    Mirtha Ballard PA-C, MPAS      Call me with questions  [phone]

## 2021-05-29 NOTE — PROGRESS NOTES
"Giaocmo Schneider Sr.  1945  [id number]    Reason for visit: Giacomo Schneider Sr. is 74 y.o. year old male seen in clinic for routine follow-up after cardiac surgery, CABG x 3 with LAAL.  Hospital course was on path with exception of some anxiety experienced predomintently in the evening interfering with sleep. He was treated with Seroquel at  with good results.     Of note, patient has chronic a-fib and did have a-fib with and without RVR post-op. He was on Amiodarone and Eliquis at discharge.    Today Mr. Schneider is accompanied by his wife, he is walking independently and appears steady and strong when up. He is using his pillow, demonstrates care with arm use as he get up and down from a sitting position.    He will plan to discuss Eliquis use with Dr. Vee. He does not want to be on any anticoagulation with his atrial fib. Pre operatively he used single full dose of aspirin.   He is active and wants to resume his activities such as softball, lawn care and using power tools. We discussed at length his sternal precautions/lifting restrictions.      Overall He is doing well. He says he's had some discomfort in his chest that seems to come when provoked with movement. It is nonsustained, nonradiating, he denies shortness of breath, FONSECA. He describes some discouragement related to not feeling much different since his surgery, especially since he has experienced the chest discomfort that he had pre op which he related to muscle discomfort not \"heart\" related. He was very surprised by his initial diagnosis of CAD which was discovered on a noncardiac CT scan.       Procedure:  CABG x 3 with Left atrial appendage ligation  DOS 5/1/19- Dr. Bai  Day of discharge:  5/6/19  Discharge to Home  Dc weight 227 lbs     Readmissions: none    Followed up with Giovani Ponce MD on 5/13/19  Follow up with Cardiology scheduled for 6/20 with Dr. Vee  Cardiac Rehab start date 5/22/19    Assessment  Exam  VS BP 98/64 " "(Patient Site: Left Arm, Patient Position: Sitting, Cuff Size: Adult Regular)   Pulse (!) 56   Resp 16   Ht 6' (1.829 m)   Wt 220 lb 9.6 oz (100.1 kg)   BMI 29.92 kg/m      Gen: Alert, oriented, pleasant, no acute distress  CV: RRR without murmur or rub, no appreciable edema  Lungs: CTAB, non-labored breathing on room air  GI: Abdomen soft, non-tender, non-distended  Sternum is stable no movement with cough.  Incisions: Chest and leg incisions well healing; C/D/I without erythema, purulence, swelling.    Appetite: \"good\"  Bowels: regular  Weight: below baseline    Plan    Giacomo Schneider Sr. is a 74 y.o. year old male status post CABG with LAAL who returns to clinic for post-op visit.    Reviewed with patient:  Vital signs BP 98/64 (Patient Site: Left Arm, Patient Position: Sitting, Cuff Size: Adult Regular)   Pulse (!) 56   Resp 16   Ht 6' (1.829 m)   Wt 220 lb 9.6 oz (100.1 kg)   BMI 29.92 kg/m      Medications no changes     1. Surgically doing well. Incisions are healing well with no signs of infection. Sternum is stable.  2. Vital signs are stable.   3. Follow-up with cardiology as scheduled on June 20, 2019 with Dr. Vee.  4. Continue cardiac rehab until completed.  5. Continue sternal precautions August 1, 2019  6. May start driving on June 1st, 2019  7. Return to work: NA  8. Follow up with Mirtha on 6/5/19  .  No need for further follow-up with CV surgery unless concerns.      Feel free to call our office with questions. [phone]          "

## 2021-05-29 NOTE — PROGRESS NOTES
ITP ASSESSMENT   Assessment Day: 30 Day    Session Number: 8  Precautions: Surgical, falls risk, back/neck pain, sciatica, inguinal hernia    Diagnosis: CAB (CABGx3, Ligation of JUDY (5/1/19))    Risk Stratification: High    Referring Provider: Giovani Ponce,*  EXERCISE  Exercise Assessment: Reassessment                              Exercise Plan  Goals Next 30 days  ADL'S: Resume showering/toweling off independently.    Leisure: Resume vacuuming.    Work: Retired      Education Goals: Patient can state cardiac s/s and appropriate emergency response.    Education Goals Met: Has system for taking medication.;Medication review.                          Goals Met  Initial ADL's goals met: Goal met- pt has resumed driving.  Pt has not resumed toweling off himself.    Initial Leisure goals met: Goal met- pt has resumed 1 flight of stairs     Intial Work goals met: Retired      Exercise Prescription  Exercise Mode: Treadmill;Bike;Nustep;Arm Erg.;Hallway Walking;Stairs    Frequency: 22x/week    Duration: 30-45 mins    Intensity / THR: 20-30 beats above resting heart rate    RPE 11-14  Progression / Met level: 3.1-4    Resistive Training?: Yes      Current Exercise (mins/week): 120      Interventions  Home Exercise:  Mode: Walking    Frequency: 2-3x/week    Duration: 15-20 mins, 1-2x/day      Education Material : Educational videos;Provide written material;Individual education and counseling;Offer educational classes      Education Completed  Exercise Education Completed: Cardiac Anatomy;Signs and Symptoms;Medication review;RPE;Emergency Plan;Home Exercise;Warm up/cool down;FITT Principles;Stretching;BP/HR Reponse to exercise;Strength training;Benefits of Exercise;End point of exercise              Exercise Follow-up/Discharge  Follow up/Discharge: Pt has reached peak of 3 METs on Nustep.  He reports walking more often and for longer at home.  Encouraged him to continue walking at home, and pt wanting to work in  "weights as well.           NUTRITION  Nutrition Assessment: Reassessment      Nutrition Risk Factors:  Nutrition Risk Factors: Dyslipidemia;Overweight  Cholesterol: (4/22/19) 135  LDL: 78  HDL: 42  Triglycerides: 78      Nutrition Plan  Interventions  Diet Consult: Completed (Wife met with dietician)    Other Nutrition Intervention: Therapist/Pt Discussion;Educational Videos;Provide with Written Material    Initial Rate Your Plate Score: 44      Education Completed  Nutrition Education Completed: Risk factor overview      Goals  Nutrition Goals (Next 30 days): Patient can identify their risk factors for CAD;Patient will follow a low sodium diet;Patient knows appropriate portion size;Patient will follow a low saturated fat diet      Goals Met  Nutrition Goals Met: Reviewed Dietitian schedule;Provided Rate your Plate Survey;Completed Nutritional Risk Screen      Height, Weight, and  BMI  Weight: 223 lb 6.4 oz (101.3 kg)  Height: 6' (1.829 m)  BMI: 30.29      Nutrition Follow-up  Follow-up/Discharge: Pt's wife met with dietician.  Pt himself not interested in making changes.  He reports he doesn't know if his wife has made changes to his diet, he just \"eats what she makes.\"       Other Risk Factors  Other Risk Factor Assessment: Reassessment      HTN Risk Factor: Hypertension      Pre Exercise BP: 108/70  Post Exercise BP: 128/60      Hypertension Plan  Goals  HTN Goals: Follow low sodium diet      Goals Met  HTN Goals Met: Take medication as prescribed;Exercises regularly      HTN Interventions  HTN Interventions: Diet consult;Therapist/patient discussion;Provide written material;Offer educational classes      HTN Education Completed  HTN Education Completed: Medication review;Risk factor overview      Tobacco Risk Factor: Tobacco      Initial Use:: 1 cigar daily  Current Use:: 1 cigar daily      Tobacco Plan  Tobacco Goals  Tobacco Goals: Patient not ready to change      Goals Met  Tobacco Goals Met: patient not " ready to change      Tobacco Interventions  Tobacco Interventions: Therapist/patient discussion      Tobacco Education Completed  Tobacco Education Completed: Risk factor overview      Risk Factor Follow-up   Follow-up/Discharge: Pt not willing to stop smoking 1 cigar/day.         PSYCHOSOCIAL  Psychosocial Assessment: Reassessment         Psychosocial Risk Factor: NA      Psychosocial Plan  Interventions  Interventions: Offer Spiritual Care consult;Offer educational videos and classes;Provide written material;Individual education and counseling      Education Completed  Education Completed: Effects of stress on body;S/S of depression;Relaxation/Coping Techniques      Goals  Goals (Next 30 days): Oriented to stress management classes;Improvement in [employer] COOP score;Patient demonstrates understanding of stress, no goals identified for the next 30 days      Goals Met  Goals Met: Identified Support system;Identify stressors;Practicing stress management skills      Psychosocial Follow-up  Follow-up/Discharge: Pt denies stress but is not willing to elaborate on any stressors he may have.  He does seem bothered by the restrictions and lack of independence, but has been in a better mood since ramos has been feeling better.           Patient involved in Goal setting?: Yes      Signature: _____________________________________________________________    Date: __________________    Time: __________________

## 2021-05-29 NOTE — PATIENT INSTRUCTIONS - HE
Reviewed with patient:  Vital signs BP 98/64 (Patient Site: Left Arm, Patient Position: Sitting, Cuff Size: Adult Regular)   Pulse (!) 56   Resp 16   Ht 6' (1.829 m)   Wt 220 lb 9.6 oz (100.1 kg)   BMI 29.92 kg/m      Medications no changes     1. Surgically doing well. Incisions are healing well with no signs of infection. Sternum is stable.  2. Vital signs are stable.   3. Follow-up with cardiology as scheduled on June 20, 2019 with Dr. Vee.  4. Continue cardiac rehab until completed.  5. Continue sternal precautions August 1, 2019  6. May start driving on June 1st, 2019  7. Return to work: NA  8. Follow up with Mirtha on 6/5/19  .  No need for further follow-up with CV surgery unless concerns.      Feel free to call our office with questions. [phone]

## 2021-05-30 ENCOUNTER — RECORDS - HEALTHEAST (OUTPATIENT)
Dept: ADMINISTRATIVE | Facility: CLINIC | Age: 76
End: 2021-05-30

## 2021-05-30 VITALS — WEIGHT: 228 LBS | HEIGHT: 73 IN | BODY MASS INDEX: 30.22 KG/M2

## 2021-05-30 NOTE — PROGRESS NOTES
ITP ASSESSMENT   Assessment Day: 60 Day    Session Number: 13  Precautions: Surgical, falls risk, back/neck pain, sciatica, inguinal hernia    Diagnosis: CAB (CABGx3, Ligation of JUDY (5/1/19))    Risk Stratification: High    Referring Provider: Dr. Bai  ITP sent to Dr. Arango  EXERCISE  Exercise Assessment: Reassessment                                         Exercise Plan  Goals Next 30 days  ADL'S: Increase MET level to 3-4 to mock heavier duty yardwork.    Leisure: Increase U/E strength by doing wts 1x/week in rehab and at home.    Work: Retired      Education Goals: Patient can state cardiac s/s and appropriate emergency response.    Education Goals Met: Has system for taking medication.;Medication review.                          Goals Met  30 day ADL'S goals met: Goal met- pt showering/toweling off independently.    30 day Leisure goals met: Goal met- pt has resumed vacuuming.    30 day Work goals met: Retired      Initial ADL's goals met: Goal met- pt has resumed driving.  Pt has not resumed toweling off himself.    Initial Leisure goals met: Goal met- pt has resumed 1 flight of stairs     Intial Work goals met: Retired      Exercise Prescription  Exercise Mode: Treadmill;Bike;Nustep;Arm Erg.;Hallway Walking;Stairs    Frequency: 2x/week    Duration: 30-45 mins    Intensity / THR: 20-30 beats above resting heart rate    RPE 11-14  Progression / Met level: 3-4    Resistive Training?: Yes      Current Exercise (mins/week): 130      Interventions  Home Exercise:  Mode: Walking, biking    Frequency: 2-3x/week    Duration: 15-20 mins, 1-2x/day      Education Material : Educational videos;Provide written material;Individual education and counseling;Offer educational classes      Education Completed  Exercise Education Completed: Cardiac Anatomy;Signs and Symptoms;Medication review;RPE;Emergency Plan;Home Exercise;Warm up/cool down;FITT Principles;Stretching;BP/HR Reponse to exercise;Strength training;Benefits  "of Exercise;End point of exercise              Exercise Follow-up/Discharge  Follow up/Discharge: Pt has reached peak of    NUTRITION  Nutrition Assessment: Reassessment      Nutrition Risk Factors:  Nutrition Risk Factors: Dyslipidemia;Overweight      Nutrition Plan  Interventions  Diet Consult: Completed    Other Nutrition Intervention: Therapist/Pt Discussion;Educational Videos;Provide with Written Material    Initial Rate Your Plate Score: 44      Education Completed  Nutrition Education Completed: Risk factor overview      Goals  Nutrition Goals (Next 30 days): Patient can identify their risk factors for CAD;Patient will follow a low sodium diet;Patient knows appropriate portion size;Patient will follow a low saturated fat diet      Goals Met  Nutrition Goals Met: Reviewed Dietitian schedule;Provided Rate your Plate Survey;Completed Nutritional Risk Screen      Height, Weight, and  BMI  Weight: 226 lb 12.8 oz (102.9 kg)  Height: 6' (1.829 m)  BMI: 30.75      Nutrition Follow-up  Follow-up/Discharge: Pt's wife met with dietician.  Pt himself not interested in making changes.  He reports he doesn't know if his wife has made changes to his diet, he just \"eats what she makes.\"         Other Risk Factors  Other Risk Factor Assessment: Reassessment      HTN Risk Factor: Hypertension      Pre Exercise BP: 116/70  Post Exercise BP: 108/70      Hypertension Plan  Goals  HTN Goals: Follow low sodium diet      Goals Met  HTN Goals Met: Take medication as prescribed;Exercises regularly      HTN Interventions  HTN Interventions: Diet consult;Therapist/patient discussion;Provide written material;Offer educational classes      HTN Education Completed  HTN Education Completed: Medication review;Risk factor overview      Tobacco Risk Factor: Tobacco      Initial Use:: 1 cigar daily  Current Use:: 1 cigar daily      Tobacco Plan  Tobacco Goals  Tobacco Goals: Patient not ready to change      Goals Met  Tobacco Goals Met: patient " not ready to change      Tobacco Interventions  Tobacco Interventions: Therapist/patient discussion      Tobacco Education Completed  Tobacco Education Completed: Risk factor overview      Risk Factor Follow-up   Follow-up/Discharge: Pt not willing to stop smoking one cigar/day.     PSYCHOSOCIAL  Psychosocial Assessment: Reassessment         Psychosocial Risk Factor: NA      Psychosocial Plan  Interventions  Interventions: Offer Spiritual Care consult;Offer educational videos and classes;Provide written material;Individual education and counseling      Education Completed  Education Completed: Effects of stress on body;S/S of depression;Relaxation/Coping Techniques      Goals  Goals (Next 30 days): Oriented to stress management classes;Improvement in [employer] COOP score;Patient demonstrates understanding of stress, no goals identified for the next 30 days      Goals Met  Goals Met: Identified Support system;Identify stressors;Practicing stress management skills      Psychosocial Follow-up  Follow-up/Discharge: Pt denies stress, and is not willing to elaborate on stressors he does have.  He does report feeling better as he is improving physically, and has much less frustration than immediately after surgery.             Patient involved in Goal setting?: Yes      Signature: _____________________________________________________________    Date: __________________    Time: __________________

## 2021-05-30 NOTE — PROGRESS NOTES
Giacomo Schneider Sr. has participated in 17 sessions of Phase II Cardiac Rehab.    Progress Report: Dr. Vee  Cardiac Rehab Treatment Progress Report 7/8/2019 7/10/2019 7/15/2019 7/17/2019 7/22/2019   Weight 226 lbs 13 oz 227 lbs 10 oz 229 lbs 6 oz 227 lbs 13 oz 228 lbs   Pre Exercise  HR 81 82 90 77 83-88   Pre Exercise /70 114/70 104/60 112/70 118/68   Treadmill Peak HR - - - - -   Nustep Peak Heart Rate   111-120 106-118 109-124   Nustep Peak Blood Pressure 138/62 132/60 150/68 140/70 142/68   Heart Rate 82 88 83-88 87 83-87   Post Exercise /70 102/70 112/74 104/72 132/60   ECG A-Fib A-Fib A-fib, rare PVC's A-fib A-fib with PVC's, Note: 0.5mm TWI/ST depression with weights-SOB/denies Angina    Total Exercise Minutes 45 41 41 41 36   Current Status:  Symptomatic: SOB (96%) with ST changes. Pt denies angina sx's    Therapists Comments: Pt has the last few sessions with increased HR shown TWI/ST depression 0.5mm in inferior leads    EKG's are available upon request via will fax over but will have  scan in [employer] tomorrow.   If Physician recommends change in treatment plan, please place orders.    __________________________________________________      _____________  Signature                                                                                                  Date

## 2021-05-31 VITALS — WEIGHT: 225 LBS | HEIGHT: 72 IN | BODY MASS INDEX: 30.48 KG/M2

## 2021-05-31 VITALS — WEIGHT: 228 LBS | BODY MASS INDEX: 30.22 KG/M2 | HEIGHT: 73 IN

## 2021-05-31 VITALS — BODY MASS INDEX: 30.22 KG/M2 | WEIGHT: 228 LBS | HEIGHT: 73 IN

## 2021-05-31 VITALS — HEIGHT: 73 IN | BODY MASS INDEX: 30.22 KG/M2 | WEIGHT: 228 LBS

## 2021-05-31 NOTE — PROGRESS NOTES
ITP ASSESSMENT   Assessment Day: 90 Day    Session Number: 20  Precautions: Surgical, falls risk, back/neck pain, sciatica, inguinal hernia    Diagnosis: CAB (CABGx3, Ligation of JUDY (5/1/19))    Risk Stratification: High    Referring Provider: Giovani Ponce,*   ITP:Dr. Arango  EXERCISE  Exercise Assessment: Reassessment                            Exercise Plan  Goals Next 30 days  ADL'S: Goals to be addressed when pt returns    Leisure: Increase U/E strength by doing wts 1x/week in rehab and at home.    Work: Retired      Education Goals: Patient can state cardiac s/s and appropriate emergency response.    Education Goals Met: Has system for taking medication.;Medication review.                          Goals Met  60 day ADL'S goals met: Goals to be addressed when pt returns      30 day ADL'S goals met: Goal met- pt showering/toweling off independently.    30 day Leisure goals met: Goal met- pt has resumed vacuuming.    30 day Work goals met: Retired      Initial ADL's goals met: Goal met- pt has resumed driving.  Pt has not resumed toweling off himself.    Initial Leisure goals met: Goal met- pt has resumed 1 flight of stairs     Intial Work goals met: Retired    Exercise Prescription  Exercise Mode: Treadmill;Bike;Nustep;Arm Erg.;Hallway Walking;Stairs    Frequency: 2x/week    Duration: 35-45 min    Intensity / THR: 20-30 beats above resting heart rate    RPE 11-14  Progression / Met level: 2.9-4    Resistive Training?: Yes      Current Exercise (mins/week): 80      Interventions  Home Exercise:  Mode: home exercise to be addressed when pt returns    Frequency: 2-3x/week    Duration: 15-20 mins, 1-2x/day      Education Material : Educational videos;Provide written material;Individual education and counseling;Offer educational classes      Education Completed  Exercise Education Completed: Cardiac Anatomy;Signs and Symptoms;Medication review;RPE;Emergency Plan;Home Exercise;Warm up/cool down;FITT  Principles;Stretching;BP/HR Reponse to exercise;Strength training;Benefits of Exercise;End point of exercise              Exercise Follow-up/Discharge  Follow up/Discharge: Pt has reached 2.8 MET level, pt has declined doing stairs in cardiac rehab, pt is not consistent with home exercise program   NUTRITION  Nutrition Assessment: Reassessment      Nutrition Risk Factors:  Nutrition Risk Factors: Dyslipidemia;Overweight      Nutrition Plan  Interventions  Diet Consult: Completed    Other Nutrition Intervention: Therapist/Pt Discussion;Provide with Written Material    Initial Rate Your Plate Score: 44      Education Completed  Nutrition Education Completed: Risk factor overview          Goals Met  Nutrition Goals Met: Reviewed Dietitian schedule;Provided Rate your Plate Survey;Completed Nutritional Risk Screen      Height, Weight, and  BMI  Weight: 226 lb 12.8 oz (102.9 kg)  Height: 6' (1.829 m)  BMI: 30.75      Nutrition Follow-up  Follow-up/Discharge: Goals to be addressed when pt returns.         Other Risk Factors  Other Risk Factor Assessment: Reassessment      HTN Risk Factor: Hypertension      Pre Exercise BP: 116/68  Post Exercise BP: 102/60      Hypertension Plan      Goals Met  HTN Goals Met: Take medication as prescribed;Exercises regularly      HTN Interventions  HTN Interventions: Diet consult;Therapist/patient discussion;Provide written material;Offer educational classes      HTN Education Completed  HTN Education Completed: Medication review;Risk factor overview      Tobacco Risk Factor: Tobacco      Initial Use:: 1 cigar daily  Current Use:: 1 cigar daily      Tobacco Plan        Tobacco Interventions  Tobacco Interventions: Therapist/patient discussion      Tobacco Education Completed  Tobacco Education Completed: Risk factor overview      Risk Factor Follow-up   Follow-up/Discharge: Goals to be addressed when pt returns.     PSYCHOSOCIAL  Psychosocial Assessment: Reassessment         Psychosocial  Risk Factor: NA      Psychosocial Plan  Interventions  Interventions: Offer Spiritual Care consult;Offer educational videos and classes;Provide written material;Individual education and counseling      Education Completed  Education Completed: Effects of stress on body;S/S of depression;Relaxation/Coping Techniques      Goals  Goals (Next 30 days): Oriented to stress management classes;Improvement in [employer] COOP score;Patient demonstrates understanding of stress, no goals identified for the next 30 days      Goals Met  Goals Met: Identified Support system;Identify stressors;Practicing stress management skills      Psychosocial Follow-up  Follow-up/Discharge: Goals to be addressed when pt returns             Patient involved in Goal setting?: No      Signature: _____________________________________________________________    Date: __________________    Time: __________________

## 2021-05-31 NOTE — TELEPHONE ENCOUNTER
----- Message from Dhiraj Arceo sent at 9/3/2019  8:42 AM CDT -----  Contact: Pt  General phone call:    Caller: Pt    Primary cardiologist: Dr Vee    Detailed reason for call: Pt was in a couple weeks ago and saw Dr Vee - was ordered ECHO NATALIO.  Pt then saw IRVIN CUMMINGS 8/27 where it was discussed to have a CT instead (according to the Pt)  There was note about a possible sedated NATALIO? - Pt would like to know which test he should have and he is asking for a call back to clarify as well as schedule the test.     New or active symptoms? na  Best phone number: home  Best time to contact: any  Ok to leave a detailed message? yes  Device? no    Additional Info:

## 2021-05-31 NOTE — PROGRESS NOTES
ITP ASSESSMENT   Assessment Day: 120 Day    Session Number: 26  Precautions: Surgical, falls risk, back/neck pain, sciatica, inguinal hernia, A-Fib    Diagnosis: CAB (CABGx3, Ligation of JUDY (5/1/19))    Risk Stratification: High    Referring Provider: Giovani Ponce,*   ITP: Dr. Arango  EXERCISE  Exercise Assessment: Reassessment                         Exercise Plan  Goals Next 30 days  ADL'S: Continue to Increase MET level in CR to 3-4+ METs to mock heavier yardwork.    Leisure: Start using resistance bands 1-2x/week to increase U/E strength and flexibility.    Work: Pt's long-term goal is to play softball by Oct 1st.      Education Goals: Patient can state cardiac s/s and appropriate emergency response.                      Goals Met  90 day ADL'S goals met: (S) Pt has reached a 2.8-3 METs on Nustep for ~28-29minutes  and 3.6-4METs on Airdyne for limited duration due to uncomfort seat. Continue with goals to mock yardwork at 3-4+METs    90 day Leisure goals met: (S) ?Address goals/goal setting when pt is present.     90 day Work goals met: (S) ?    90 Day Progress: Pt has been limited with progression due to A-Fib with higher HRs, HTN with sx's. Pt has declined to do certain modalities due to orthopedic limitations, seats being uncomfortable. Continue to offer changes in modalities/levels to progress pt to his LTG 6-8: To be able to walk/jog/sprint and play softball this fall, climb ladders/trimtrees and use chain saw.       60 day ADL'S goals met: Goal not met- pt has not increased MET level to 3-4 so far, as he has decreased his TM use because of preference.    60 day Leisure goals met: Goal met- pt has increased U/E strength by doing wts 1-2x/week in rehab.      30 day ADL'S goals met: Goal met- pt showering/toweling off independently.    30 day Leisure goals met: Goal met- pt has resumed vacuuming.    30 day Work goals met: Retired    Initial ADL's goals met: Goal met- pt has resumed driving.  Pt  has not resumed toweling off himself.    Initial Leisure goals met: Goal met- pt has resumed 1 flight of stairs     Intial Work goals met: Retired      Exercise Prescription  Exercise Mode: Treadmill;Bike;Nustep;Arm Erg.;Hallway Walking;Stairs    Frequency: 2x/week    Duration: 30-45minutes    Intensity / THR: 20-30 beats above resting heart rate    RPE 11-14  Progression / Met level: 3-4+    Resistive Training?: Yes      Current Exercise (mins/week): (S) 44 (Pt has not been compliant with HEP on a regular basis)      Interventions  Home Exercise:  Mode: walking, biking     Frequency: 2-3x/week    Duration: 15-20 mins, 1-2x/day      Education Material : Educational videos;Provide written material;Individual education and counseling;Offer educational classes      Education Completed  Exercise Education Completed: Cardiac Anatomy;Signs and Symptoms;Medication review;RPE;Emergency Plan;Home Exercise;Warm up/cool down;FITT Principles;Stretching;BP/HR Reponse to exercise;Strength training;Benefits of Exercise;End point of exercise              Exercise Follow-up/Discharge  Follow up/Discharge: Skilled therapy needed to continue to monitor pt for proper cv response with ex. Pt does have chronic A-Fib and occassionally A-Fib is peaking 150-160's and pt appears to be SOB, Fatigued-continue to monitor and adjust ex levels for patient. Continue to try and change up modalities for pt to reach his LTG of 6-8 METs and be able to resume softball: walk/jog/sprint this fall.    NUTRITION  Nutrition Assessment: Reassessment    Nutrition Risk Factors:  Nutrition Risk Factors: Dyslipidemia;Overweight  Cholesterol: 135 (4/22/19)  LDL: 78  HDL: 42  Triglycerides: 78    Nutrition Plan  Interventions  Diet Consult: Completed    Other Nutrition Intervention: Diet Class;Therapist/Pt Discussion;Provide with Written Material    Initial Rate Your Plate Score: 44    Follow-Up Rate Your Plate Score: 0 (will encourage pt to complete next  session)      Education Completed  Nutrition Education Completed: Risk factor overview      Goals  Nutrition Goals (Next 30 days): Patient can identify their risk factors for CAD;Patient will follow a low sodium diet;Patient knows appropriate portion size;Patient will follow a low saturated fat diet      Goals Met  Nutrition Goals Met: Reviewed Dietitian schedule;Provided Rate your Plate Survey;Completed Nutritional Risk Screen      Height, Weight, and  BMI  Weight: 231 lb 3.2 oz (104.9 kg)  Height: 6' (1.829 m)  BMI: 31.35      Nutrition Follow-up  Follow-up/Discharge: (S) Pt not interested in further diet education.  Wife met with dietician a couple months ago.         Other Risk Factors  Other Risk Factor Assessment: Reassessment      HTN Risk Factor: Hypertension      Pre Exercise BP: 142/68  Post Exercise BP: 134/64      Hypertension Plan  Goals  HTN Goals: Exercises regularly;Follow low sodium diet;Take medication as prescribed      Goals Met  HTN Goals Met: Take medication as prescribed      HTN Interventions  HTN Interventions: Diet consult;Therapist/patient discussion;Provide written material;Offer educational classes      HTN Education Completed  HTN Education Completed: Medication review;Risk factor overview      Tobacco Risk Factor: Tobacco      Initial Use:: 1 cigar daily  Current Use:: (S) 1 cigar daily      Tobacco Plan  Tobacco Goals  Tobacco Goals: Patient not ready to change      Goals Met  Tobacco Goals Met: patient not ready to change      Tobacco Interventions  Tobacco Interventions: Therapist/patient discussion      Tobacco Education Completed  Tobacco Education Completed: Risk factor overview      Risk Factor Follow-up   Follow-up/Discharge: (S) Pt not willing to stop smoking 1 cigar/day.     PSYCHOSOCIAL  Psychosocial Assessment: Reassessment       Lolly SILVA of L Summary Score: 28 (28 pre score)      PHQ-9 Total Score: 8 (pre BEBO-D:8)      Psychosocial Risk Factor: NA      Psychosocial  Plan  Interventions  Interventions: Offer Spiritual Care consult;Offer educational videos and classes;Provide written material;Individual education and counseling      Education Completed  Education Completed: Effects of stress on body;S/S of depression;Relaxation/Coping Techniques      Goals  Goals (Next 30 days): Oriented to stress management classes;Improvement in [employer] COOP score;Patient demonstrates understanding of stress, no goals identified for the next 30 days      Goals Met  Goals Met: Identified Support system;Identify stressors;Practicing stress management skills      Psychosocial Follow-up  Follow-up/Discharge: (S) Pt denies stress, but is anxious to return to playing softball by beginning of Oct.  He does report feeling much better than 1 month ago.           Patient involved in Goal setting?: (S) No (Readdress all goals/goals met and education next session)

## 2021-05-31 NOTE — TELEPHONE ENCOUNTER
"NATALIO is ordered, by Dr Vee to \"see if communication between left atrium and appendage is closed.\"  Informed pt will let provider know about inability to pass probe, and will have scheduling reach out to pt.  Pt verbalized understanding and had no questions.  -rah  "

## 2021-05-31 NOTE — PROGRESS NOTES
Giacomo Schneider Sr. has participated in 24 sessions of Phase II Cardiac Rehab.    Progress Report:   Cardiac Rehab Treatment Progress Report 7/31/2019 8/7/2019 8/12/2019 8/14/2019 8/19/2019   Weight 229 lbs 8 oz 229 lbs 13 oz 230 lbs 3 oz 229 lbs 5 oz 224 lbs 2 oz   Pre Exercise  HR 82-96  93 72 72-78   Pre Exercise /68 108/64 106/68 100/64 102/64   Treadmill Peak HR - - - - -   Nustep Peak Heart Rate 100-119 105-119 110-120 105-112 115-121   Nustep Peak Blood Pressure - 130/70 130/66 130/58 136/78   Heart Rate 101-106 86 88 82 80-84   Post Exercise /60 100/60 92/60 92/62 110/70   ECG A-Fib with PVC's STdepression with weights-denies chest discomfort but does get SOB.  A-Fib with occ PVCs A-Fib A-Fib A-fib   Total Exercise Minutes 42 37 38 42 38         Current Status:  Currently exercising without complaints or symptoms. Pt has reached 3.6-4 MET level with exercise    If Physician recommends change in treatment plan, please place orders.        __________________________________________________      _____________  Signature                                                                                                  Date

## 2021-05-31 NOTE — PROGRESS NOTES
Giaocmo KATI Schneider Sr. has participated in 26 sessions of Phase II Cardiac Rehab.    Progress Report:   Cardiac Rehab Treatment Progress Report 8/14/2019 8/19/2019 8/21/2019 8/21/2019 8/26/2019   Weight 229 lbs 5 oz 224 lbs 2 oz 228 lbs 230 lbs 3 oz 231 lbs 3 oz   Pre Exercise  HR 72 72-78 - 70-80 60-72   Pre Exercise /64 102/64 - 98/60 142/68   Treadmill Peak HR - - - - -   Nustep Peak Heart Rate 105-112 115-121 - 108-120    Nustep Peak Blood Pressure 130/58 136/78 - 130/66 158/70   Heart Rate 82 80-84 - 84 70s   Post Exercise BP 92/62 110/70 - 94/66 134/64   ECG A-Fib A-fib - A-fib A-Fib   Total Exercise Minutes 42 38 - 36 8         Current Status:  Therapists Comments: Pt c/o dizziness over the past weekend, and at rehab today.  Tried some light ex on Nustep, but sx did not improve.  Pt also reports BP has been running high and was also high in rehab today.  Denies pain.  Before today's session, pt had been progressing well toward long-term goal of 6-8 METs.  Pt has also started to incorporate a lot more U/E activity and wts with rehab as well.    If Physician recommends change in treatment plan, please place orders.        __________________________________________________      _____________  Signature                                                                                                  Date

## 2021-05-31 NOTE — PROGRESS NOTES
CV Surgery Clinic Note      Patient presents to clinic 3 months s/p CABG with concern for retained suture. There was indeed a small piece of suture material protruding from one of his chest tube sites. This was removed without difficulty. No concern for infection.    Additionally patient complains of pain at lower pole of incision. Exam reveals keloid formation over inferior 1/4 of wound. Recommended OTC silicone sheeting and to call if this was getting worse.    Patient also asks about recent HTN over the last week with systolics near 140. Today, SBP is 106. Did not feel that a medication change was necessary. Asked patient to watch closely and let us, primary care, or cardiology know if consistent high readings.    Lastly, patient reportedly was unable to tolerate NATALIO relating to complete closure of left atrial appendage due to gagging. He subsequently had chest CT which demonstrated possible pocket on appendage. He apparently did not tolerate this well and had several weeks of back pain and some inability to ambulate due to laying flat. Apparently he was recommended to repeat the CT but states he cannot tolerate this. I will look into potential sedated NATALIO for patient.    _____  Ingris Lyn PA-C

## 2021-06-01 VITALS — HEIGHT: 72 IN | WEIGHT: 230 LBS | BODY MASS INDEX: 31.15 KG/M2

## 2021-06-01 NOTE — PROGRESS NOTES
Giacomo Schneider Sr. has participated in 29 sessions of Phase II Cardiac Rehab.    Progress Report: Dr. Vee  Cardiac Rehab Treatment Progress Report 8/26/2019 8/27/2019 9/9/2019 9/11/2019 9/18/2019   Weight 231 lbs 3 oz 227 lbs 231 lbs 6 oz 232 lbs 10 oz 229 lbs 13 oz   Pre Exercise  HR 60-72 - 85-97 69 78-81   Pre Exercise /68 - 118/72 110/68 120/62   Treadmill Peak HR - - 140-167 135-160 130's-160bpm   Treadmill Peak Blood Pressure - - 208/84 186/76 192/82   Nustep Peak Heart Rate  - 120-142 - 110-117   Nustep Peak Blood Pressure 158/70 - 148/72 - -   Heart Rate 70s - 88-92 89    Post Exercise /64 - 102/60 92/62 98/62   ECG A-Fib - A-Fib A-fib with tachy bouts to 153-160, rare PVC's A-Fib with Tachy bouts, note the EKG changes with ST depression 2-3mm with jose rib/upper abdominal discomfort which gets worse with exertion and resolves <3min of rest and EKG ST resolve as well.    Total Exercise Minutes 8 - 35 41 42   Current Status:  Symptomatic SOB +Jose Rib/upper abdominal Pain 7/10 which is the same discomfort he had before his CAB. He states he experiences this discomfort and SOB on exertion on a daily basis. Note: these EKG changes has continued and it was flagged today because this was the 1st time pt reported that he had this discomfort and he states he has had it before but has not told us in the past.       If Physician recommends change in treatment plan, please place orders.        __________________________________________________      _____________  Signature                                                                                                  Date

## 2021-06-01 NOTE — TELEPHONE ENCOUNTER
Received MTM referral from patient's insurance ( [employer])     Attempt: 2, 9/4/19  Result: Spoke with patient and he referred me to his wife Portia who manages his medications. She asked that we call her back in a week to schedule (9/11) since now is a busy time for her. Portia # [phone]    Thank you for the referral,    Mala Owens, MTM Coordinator Intern

## 2021-06-01 NOTE — PROGRESS NOTES
ITP ASSESSMENT   Assessment Day: 120 Day    Session Number: 27  Precautions: Surgical, falls risk, back/neck pain, sciatica, inguinal hernia, A-Fib    Diagnosis: CAB (CABGx3, Ligation of JUDY (5/1/19))    Risk Stratification: High    Referring Provider: Giovani Ponce,*   ITP: Dr. Arango  EXERCISE  Exercise Assessment: Reassessment                       Exercise Plan  Goals Next 30 days  ADL'S: Continue to Increase MET level in CR to 3-4+ METs to mock heavier yardwork.    Leisure: Resume walk/jog/sprinting to get in shape for softball from 2 cycles to 5cycles to help build strength and stamina. Start intervals training on TM as pt tolerates to mock same level for soft ball ~4.2-6METs    Work: Pt's long-term goal is to play softball by Oct 1st. (walk/jog/run at a ~3.5-4mph)      Education Goals: Patient can state cardiac s/s and appropriate emergency response.    Education Goals Met: Has system for taking medication.;Medication review.                          Goals Met  90 day ADL'S goals met: Pt has reached a 2.8-3 METs on Nustep for ~28-29minutes  and 3.6-4METs on Airdyne for limited duration due to uncomfort seat. Continue with goals to mock yardwork at 3-4+METs    90 day Leisure goals met: Goal met: Pt started l using resistance bands 1-2x/week to increase U/E strength and  started some stretches for flexibility.    90 day Work goals met: Pt has been working on his strength training for all muscle groups for softball. Pt was encouraged to continue to walk as tolerated for longer durations-to help build stamina. Increase home walking from 25' to 30'    90 Day Progress: Pt has been limited with progression due to A-Fib with higher HRs, HTN with sx's. Pt has declined to do certain modalities due to orthopedic limitations, seats being uncomfortable. Continue to offer changes in modalities/levels to progress pt to his LTG 6-8: To be able to walk/jog/sprint and play softball this fall, climb ladders/trimtrees  and use chain saw.       60 day ADL'S goals met: Goal not met- pt has not increased MET level to 3-4 so far, as he has decreased his TM use because of preference.    60 day Leisure goals met: Goal met- pt has increased U/E strength by doing wts 1-2x/week in rehab.      30 day ADL'S goals met: Goal met- pt showering/toweling off independently.    30 day Leisure goals met: Goal met- pt has resumed vacuuming.    30 day Work goals met: Retired    Initial ADL's goals met: Goal met- pt has resumed driving.  Pt has not resumed toweling off himself.    Initial Leisure goals met: Goal met- pt has resumed 1 flight of stairs     Intial Work goals met: Retired    Exercise Prescription  Exercise Mode: Treadmill;Bike;Nustep;Arm Erg.;Hallway Walking;Stairs    Frequency: 2x/week    Duration: 30-45minutes    Intensity / THR: 20-30 beats above resting heart rate    RPE 11-14  Progression / Met level: 3-4+    Resistive Training?: Yes      Current Exercise (mins/week): 85      Interventions  Home Exercise:  Mode: Walking, Biking    Frequency: 2-3x/week    Duration: 30minutes      Education Material : Educational videos;Provide written material;Individual education and counseling;Offer educational classes      Education Completed  Exercise Education Completed: Cardiac Anatomy;Signs and Symptoms;Medication review;RPE;Emergency Plan;Home Exercise;Warm up/cool down;FITT Principles;Stretching;BP/HR Reponse to exercise;Strength training;Benefits of Exercise;End point of exercise              Exercise Follow-up/Discharge  Follow up/Discharge: Skilled therapy needed to continue to monitor pt for proper cv response with ex. Pt does have chronic A-Fib and occassionally A-Fib is peaking 150-160's and pt appears to be SOB, Fatigued-continue to monitor and adjust ex levels for patient. Continue to try and change up modalities for pt to reach his LTG of 6-8 METs and be able to resume softball: walk/jog/sprint this fall. Pt is surgical and interval  training would be the best way to monitor and mock pt's LTG.    NUTRITION  Nutrition Assessment: Reassessment    Nutrition Risk Factors:  Nutrition Risk Factors: Dyslipidemia;Overweight  Cholesterol: 135 (4/22/19)  LDL: 78  HDL: 42  Triglycerides: 78    Nutrition Plan  Interventions  Diet Consult: Completed    Other Nutrition Intervention: Diet Class;Therapist/Pt Discussion;Provide with Written Material    Initial Rate Your Plate Score: 44    Follow-Up Rate Your Plate Score: 0 (will encourage pt to complete next session)      Education Completed  Nutrition Education Completed: Risk factor overview      Goals  Nutrition Goals (Next 30 days): Patient can identify their risk factors for CAD;Patient will follow a low sodium diet;Patient knows appropriate portion size;Patient will follow a low saturated fat diet      Goals Met  Nutrition Goals Met: Reviewed Dietitian schedule;Provided Rate your Plate Survey;Completed Nutritional Risk Screen      Height, Weight, and  BMI  Weight: 231 lb 3.2 oz (104.9 kg)  Height: 6' (1.829 m)  BMI: 31.35      Nutrition Follow-up  Follow-up/Discharge: Pt continues to not be interested in dietician concsults. Addressed with other ITPs         Other Risk Factors  Other Risk Factor Assessment: Reassessment      HTN Risk Factor: Hypertension      Pre Exercise BP: 118/72  Post Exercise BP: 102/60 (no cv s/s with drop in BP, encouraged to drink water)      Hypertension Plan  Goals  HTN Goals: Exercises regularly;Follow low sodium diet;Take medication as prescribed      Goals Met  HTN Goals Met: Take medication as prescribed      HTN Interventions  HTN Interventions: Diet consult;Therapist/patient discussion;Provide written material;Offer educational classes      HTN Education Completed  HTN Education Completed: Medication review;Risk factor overview      Tobacco Risk Factor: Tobacco      Initial Use:: 1 cigar daily  Current Use:: (S) 1 cigar daily      Tobacco Plan  Tobacco Goals  Tobacco Goals:  Patient not ready to change      Goals Met  Tobacco Goals Met: patient not ready to change      Tobacco Interventions  Tobacco Interventions: Therapist/patient discussion      Tobacco Education Completed  Tobacco Education Completed: Risk factor overview      Risk Factor Follow-up   Follow-up/Discharge: (S) Pt not willing to stop smoking 1 cigar/day.     PSYCHOSOCIAL  Psychosocial Assessment: Reassessment       [place] COOP Q of L Summary Score: 28 (28 pre score)      PHQ-9 Total Score: 8 (pre BEBO-D:8)      Psychosocial Risk Factor: NA      Psychosocial Plan  Interventions  Interventions: Offer Spiritual Care consult;Offer educational videos and classes;Provide written material;Individual education and counseling      Education Completed  Education Completed: Effects of stress on body;S/S of depression;Relaxation/Coping Techniques      Goals  Goals (Next 30 days): Oriented to stress management classes;Improvement in [place] COOP score;Patient demonstrates understanding of stress, no goals identified for the next 30 days      Goals Met  Goals Met: Identified Support system;Identify stressors;Practicing stress management skills      Psychosocial Follow-up  Follow-up/Discharge: (S) Pt denies stress, but is anxious to return to playing softball by beginning of Oct.  He does report feeling much better than 1 month ago.             Patient involved in Goal setting?: Yes

## 2021-06-01 NOTE — TELEPHONE ENCOUNTER
From: Caitlin Barrera <[email or web address]>   Sent: Thursday, September 26, 2019 7:15 AM  To: Ange Davies <[email or web address]>; Ingris Lyn <[email or web address]>; Gerri Craig <[email or web address]>  Subject: re: mrn [id number] NATALIO with sedation    Mr Mehrdad SALMERON  [id number] called this morning and said he will be seeing his PCP on Friday 9/27/19.  They will decide if he should have the NATALIO with sedation or not.  Patient stated that Dr Ponce and Dr Vee had decided before that patient should be scheduled for CTA.  Patient would like a follow up call from Dr Vee  if a NATALIO with sedation or CTA is needed.      9/19/19 PCP Dr Ponce:  status post bypass and AFib who had ligation of his atrial appendage to try to decrease his need for ongoing anticoagulation. He is still on oral anticoagulation because they have been unable to improve closure of his left atrial appendage. The procedure of choice was a NATALIO at first and this couldn't be done because of esophageal spasm. He then had a CT, which did show some residual opening in the connection between his left atrium and left atrial appendage. It was small, and ongoing anticoagulation was recommended. He didn't tolerate the CT because of positioning; in fact, he had two weeks of severe back pain requiring multiple chiropractic visits in order to resolve this. He would like to avoid going through that procedure if at all possible, and he is asking me to help coordinate the evaluation of his left atrial appendage closure to see if we can avoid both anticoagulation and avoid a CT again in the future    AFib. I discussed that with his CHADS-VASc score of 3 his risk of stroke is 3% per year and that anticoagulation is generally recommended unless contraindicated or not preferred. I do think that attempts should be made to try to prove his left atrial appendage is closed. I spoke with Dr. Chi, his cardiologist at [facility], who said that the basic options are  NATALIO and CT. Since NATALIO isn't possible, we'll try to keep him as comfortable as possible in a CT scan, trying to limit his time on a machine and limit his pain. I prescribed him diazepam 5 mg to take an hour prior to the procedure. I spoke with Dr. Chi, who will arrange cancellation of theTEE and scheduling of the CT to look at the pulmonary veins.      Dr Nanda Ag pt would like to speak to you regarding NATALIO vs CT.  If NATALIO is requested it would need to be done under anesthesia in the OR.  I do not see CT ordered.  -shu

## 2021-06-01 NOTE — TELEPHONE ENCOUNTER
CT Pulmonary vein with contrast ordered.  -[facility]    ----- Message -----  From: Solomon Vee MD  Sent: 9/27/2019   4:37 PM CDT  To: Gerri Craig RN    Talked to the patient who agreed to have cardiac CT structure test for evaluation LAAO.  Schedule it done at [facility] and instruct the tech who needs to pay attention to his back.  He had severe back pain from last CT test.    Thanks  Zack

## 2021-06-01 NOTE — PROGRESS NOTES
ITP ASSESSMENT   Assessment Day: 150 Day    Session Number: 31  Precautions: Surgical, falls risk, back/neck pain, sciatica, inguinal hernia, A-Fib    Diagnosis: CAB (CABGx3, Ligation of JUDY (5/1/19))    Risk Stratification: High    Referring Provider: Giovani Ponce,*   ITP:Dr. Arango  EXERCISE  Exercise Assessment: Reassessment                           Exercise Plan  Goals Next 30 days  ADL'S: Continue to Increase MET level in CR to 4-6  METs to mock climbing ladders and trimming trees.     Leisure:  Resume interval training on TM when appropriate as pt tolerates to build strength/stamina to mock same level for soft ball ~4.2-6METs    Work: Pt's long-term goal is to play softball by Oct 1st. (walk/jog/run at a ~3.5-4mph) Continue gradual increase in intensity to 4 MET level      Education Goals: All goals in this section met    Education Goals Met: Has system for taking medication.;Medication review.;Patient can state cardiac s/s and appropriate emergency response.                          Goals Met                                    120 day ADL'S goals met: Pt has reached 3.3-3.7 MET level on treadmill. Continue with increasing intensities to 4+ MET level. Pt states he is toleragin cutting and hauling wood without fatigue or SOB    120 day Leisure goals met: Intervals with light jogging deferred past week due to symptoms and ST depression with increased intensity on treadmill.    120 day Work goals met: Pt continues to work towards tolerating jogging short duration (~20 seconds) for running bases in softball. Hoping to start playing softball Oct. 1st.    120 Day Progress: Pt has reached 3.3-3.7 MET level on treadmill.      90 day ADL'S goals met: Pt has reached a 2.8-3 METs on Nustep for ~28-29minutes  and 3.6-4METs on Airdyne for limited duration due to uncomfort seat. Continue with goals to mock yardwork at 3-4+METs    90 day Leisure goals met: Goal met: Pt started l using resistance bands 1-2x/week  to increase U/E strength and  started some stretches for flexibility.    90 day Work goals met: Pt has been working on his strength training for all muscle groups for softball. Pt was encouraged to continue to walk as tolerated for longer durations-to help build stamina. Increase home walking from 25' to 30'    90 Day Progress: Pt has been limited with progression due to A-Fib with higher HRs, HTN with sx's. Pt has declined to do certain modalities due to orthopedic limitations, seats being uncomfortable. Continue to offer changes in modalities/levels to progress pt to his LTG 6-8: To be able to walk/jog/sprint and play softball this fall, climb ladders/trimtrees and use chain saw.       60 day ADL'S goals met: Goal not met- pt has not increased MET level to 3-4 so far, as he has decreased his TM use because of preference.    60 day Leisure goals met: Goal met- pt has increased U/E strength by doing wts 1-2x/week in rehab.      30 day ADL'S goals met: Goal met- pt showering/toweling off independently.    30 day Leisure goals met: Goal met- pt has resumed vacuuming.    30 day Work goals met: Retired          Exercise Prescription  Exercise Mode: Treadmill;Bike;Nustep;Arm Erg.;Hallway Walking;Stairs    Frequency: 2x/week    Duration: 35-45 min    Intensity / THR: 20-30 beats above resting heart rate ([place] for intervals 118-131)    RPE 11-14  Progression / Met level: 3.8-5+    Resistive Training?: Yes      Current Exercise (mins/week): 160      Interventions  Home Exercise:  Mode: walking, biking    Frequency: 3-4x/week    Duration: 30 min      Education Material : Educational videos;Provide written material;Individual education and counseling;Offer educational classes      Education Completed  Exercise Education Completed: Cardiac Anatomy;Signs and Symptoms;Medication review;RPE;Emergency Plan;Home Exercise;Warm up/cool down;FITT Principles;Stretching;BP/HR Reponse to exercise;Strength training;Benefits of  Exercise;End point of exercise              Exercise Follow-up/Discharge  Follow up/Discharge: Skilled therapy is needed to monitor for ECG changes with increasing intensity/jogging on treadmill to mock running bases in softball (LTG is to start playing Oct. 1st). Pt with chronic A-fib to 150's-180's with jogging. Pt LTG is 6-8 METs.   NUTRITION  Nutrition Assessment: Reassessment      Nutrition Risk Factors:  Nutrition Risk Factors: Dyslipidemia;Overweight  Cholesterol: 135 (4/22/19)  LDL: 78  HDL: 42  Triglycerides: 78      Nutrition Plan  Interventions  Diet Consult: Completed    Other Nutrition Intervention: Diet Class;Therapist/Pt Discussion;Provide with Written Material    Initial Rate Your Plate Score: 44      Follow-Up Rate Your Plate Score: 50      Education Completed  Nutrition Education Completed: Risk factor overview      Goals  Nutrition Goals (Next 30 days): Patient can identify their risk factors for CAD;Patient will follow a low sodium diet;Patient knows appropriate portion size;Patient will follow a low saturated fat diet      Goals Met  Nutrition Goals Met: Reviewed Dietitian schedule;Provided Rate your Plate Survey;Completed Nutritional Risk Screen      Height, Weight, and  BMI  Weight: 224 lb 1.6 oz (101.7 kg)  Height: 6' (1.829 m)  BMI: 30.39    Pre BMI: 30.39     Nutrition Follow-up  Follow-up/Discharge: Pt reports he is paying more attention to sodium and low fat food choices, but states he is not really interested in making many changes and reports he and his wife eat out alot.         Other Risk Factors  Other Risk Factor Assessment: Reassessment      HTN Risk Factor: Hypertension      Pre Exercise BP: 118/74  Post Exercise BP: (!) 82/54 (denied lightheadedness)      Hypertension Plan  Goals  HTN Goals: Follow low sodium diet      Goals Met  HTN Goals Met: Take medication as prescribed;Exercises regularly      HTN Interventions  HTN Interventions: Diet consult;Therapist/patient  discussion;Provide written material;Offer educational classes      HTN Education Completed  HTN Education Completed: Medication review;Risk factor overview      Tobacco Risk Factor: Tobacco      Initial Use:: 1 cigar/day  Current Use:: 1/cigar/day      Tobacco Plan  Tobacco Goals  Tobacco Goals: Patient not ready to change      Goals Met  Tobacco Goals Met: patient not ready to change      Tobacco Interventions  Tobacco Interventions: Therapist/patient discussion      Tobacco Education Completed  Tobacco Education Completed: Risk factor overview      Risk Factor Follow-up   Follow-up/Discharge: Pt not willing to stop smoking, pt states it helps calm him down.     PSYCHOSOCIAL  Psychosocial Assessment: Reassessment       [place] COOP Q of L Summary Score: 28    Pre [place] COOP Q of L Summary Score: 28     PHQ-9 Total Score: 8    Pre PHQ-9 Total Score: 8     Psychosocial Risk Factor: NA      Psychosocial Plan  Interventions    Interventions: Offer Spiritual Care consult;Offer educational videos and classes;Provide written material;Individual education and counseling      Education Completed  Education Completed: Effects of stress on body;S/S of depression;Relaxation/Coping Techniques      Goals  Goals (Next 30 days): Improvement in [place] COOP score;Patient demonstrates understanding of stress, no goals identified for the next 30 days      Goals Met  Goals Met: Identified Support system;Identify stressors;Practicing stress management skills      Psychosocial Follow-up  Follow-up/Discharge: Pt reports that continues smoking 1 cigar/day because it helps calm him down             Patient involved in Goal setting?: Yes      Signature: _____________________________________________________________    Date: __________________    Time: __________________

## 2021-06-01 NOTE — TELEPHONE ENCOUNTER
----- Message from Giovani Ponce MD sent at 10/1/2019  8:23 AM CDT -----  Regarding: Angina after CABG  Contact: [phone]  Dr Vee,  I saw Mr Schneider last week because he hadn't heard from your team about scheduling the CT to evaluate his JUDY--it now looks like that has been scheduled, as of yesterday.  However, I wanted to bring your attention to his performance in cardiac rehab.  He reports feeling bilateral rib discomfort, which is somewhat similar to his sx prior to his bypass.  Then, during cardiac rehab, nurses noted an increase in those sx accompanied by 2-3 mm of ST depression on the monitor.  The ST changes improve immediately with rest.    I'm clearly suspicious that he's having recurrent angina, which is particularly troubling for him in that he intents to be quite active going forward (softball and chopping wood).    My question is if a CTA of his coronaries could be done at the same time as his CT for his JUDY, or whether a stress test is more appropriate.    Sincere thanks,   PAOLA Ponce  Cell:   [phone]  PS.  I hope to speak to you directly about this, but was on hold for 8 minutes before speaking to your staff, which is why I'm sending this message simultaneously.

## 2021-06-02 VITALS
HEIGHT: 73 IN | BODY MASS INDEX: 30.48 KG/M2 | WEIGHT: 230 LBS | BODY MASS INDEX: 30.48 KG/M2 | WEIGHT: 230 LBS | HEIGHT: 73 IN

## 2021-06-02 VITALS — BODY MASS INDEX: 31.52 KG/M2 | HEIGHT: 72 IN | WEIGHT: 232.7 LBS

## 2021-06-02 VITALS — HEIGHT: 72 IN | BODY MASS INDEX: 31.42 KG/M2 | WEIGHT: 232 LBS

## 2021-06-02 NOTE — TELEPHONE ENCOUNTER
Informed pt test has not been resulted yet and Dr Vee is out of the office until 10/30.  Pt verbalized understanding and had no questions.  Pt is set to start playing softball 10/29/19.  herb

## 2021-06-02 NOTE — TELEPHONE ENCOUNTER
----- Message from Richascott Germain sent at 10/21/2019  9:18 AM CDT -----  General phone call:    Caller: Patient   Primary cardiologist: Dr. Vee  Detailed reason for call: Patient is looking for test results and would like to know when he stop taking his Eliquis.  He is going to start playing softball next week.  New or active symptoms?   Best phone number: [phone]  Best time to contact: Anytime  Ok to leave a detailed message? Yes  Device? no    Additional Info:

## 2021-06-02 NOTE — PROGRESS NOTES
[facility] Home Exercise Program/Discharge Summary  You have reached a 3.9 MET level and have completed 36 sessions of Cardiac Rehab.   Exercise Goals:   4-6x/week for aerobic exercise 30-60 minutes and 2-3x/week for strength training.   Modality Duration Intensity/  Rate of Perceived Exertion  OMNI Scale (1-10)   Warm-up 5 minutes 2-3   Walk 10-20 4-7   Bike 10-20 4-7              Cool Down 5 minutes 4-7   Strength Training *every other day 10-15 minutes 2-3 sets of 10-15 reps  Increase weights as tolerated.   Stretching 5 minutes 2-3   Continuous Exercise Heart Rate Guidelines:   Exercise HR range: 115-131 bpm     Special Recommendations:    Continue to follow low fat, low salt, heart healthy diet.    Continue to follow up with your doctors/providers as recommended (e.g cholesterol).    A well rounded exercise program will included aerobic/cardiovascular exercise (e.g like walking, biking, or swimming ), strength training (e.g. free weights, exercise bands, or weight machines) and stretching program.   Stop Exercise!!! If any of the following occur:    Angina/chest pain    Dizziness    Excessive perspiration/cold sweats    Abnormal shortness of breath    Changes in heart rate (slow, fast, irregular)    Sudden fatigue or numbness    Nausea  Also...    Avoid extreme temperatures - exercise indoors if necessary:   Temp+ Humidity >160, Temp-Wind Chill <20    Wait at least 1 hour after a meal before strenuous activity    Do not exercise if you have a fever or are ill    Wear comfortable, supportive athletic clothing and shoes.  You are now on your way to a heart healthy lifestyle on your own. You can do it!

## 2021-06-02 NOTE — PROGRESS NOTES
MTM Initial Encounter  Assessment & Plan                                                     CAD: Stable. Patient is on aspirin and high intensity statin which is appropriate due to ASCVD hx.     Atrial Fibrillation: Patient to continue to follow up with cardiology. No issues at this time.     GERD: Stable. Recommended to continue current regimen.     Supplements: Ok to continue supplements.     Follow Up  As needed with MTM    Subjective & Objective                                                     Giacomo Schneider . is a 74 y.o. male called for an initial visit for Medication Therapy Management. He was referred to me from  Part D program. Spoke to wife Portia on the phone.     Chief Complaint: None    Medication Adherence/Access:   is concerned about Eliquis since he plays sofball. Portia manages medications -- Giacomo has no idea what he is taking.     CAD: Hx CABG x3 5/1/19. Taking aspirin 81 mg daily and atorvastatin 80 mg daily. Last lipids checked 4/22/19    Atrial Fibrillation: Ligation of atrial appendage to try to decrease his need for ongoing anticoagulation. He is still on oral anticoagulation because they have been unable to improve closure of his left atrial appendage. NATALIO could not be done due to esophageal spasm. Then CT was done which did show some residual opening in the connection between his left atrium and left atrial appendage. He didn't tolerate the CT because of positioning --  had two weeks of severe back pain requiring multiple chiropractic visits in order to resolve this. Was recommended to try CT again and to use diazepam 5 mg one hour prior to procedure. Will be having a follow up appointment tomorrow to discuss further.   Currently taking metoprolol tartrate 50 mg half tablet (25 mg) two times a day and Eliquis  5 mg two times a day. Currently sees a cardiologist.  BP check every day at home. Does not know values. Also checks BP MonWed at cardiac rehab and except for once  BP has been good. Denies lightheadedness or dizziness.   Denies significant bleeding/bruising, but is worried about a bleed when playing softball.   [id number] score = 3.     GERD: Currently taking omeprazole 20 mg two times a day. Taking for acid reflux. Reports he absolutely needs it and it is helpful.     Supplements: Currently taking Vit C, calcium-vitamin D daily, fish oil, multivitamin, CECY-E daily, Vitamin E 400 IU daily, and glucosamine-chondrointin daily.   No results found for: [id number]      PMH: reviewed in EPIC   Allergies/ADRs: reviewed in EPIC   Alcohol: reviewed in EPIC  Tobacco:   Social History     Tobacco Use   Smoking Status Light Tobacco Smoker     Types: Cigars   Smokeless Tobacco Former User     Types: Chew   Tobacco Comment    1 cigar per day     Today's Vitals: There were no vitals filed for this visit.  ----------------    Much or all of the text in this note was generated through the use of Dragon Dictate voice-to-text software. Errors in spelling or words which seem out of context are unintentional. Sound alike errors, in particular, may have escaped editing.    The patient declined an after visit summary    I spent 15 minutes with this patient today.   I offer these suggestions with the understanding that I don't fully understand Giacomo 's past medical history and the complexity of his health conditions.  Giacomo should make no changes without the approval of his primary care provider.. A copy of the visit note was provided to the patient's provider.     Antonina Reyes Pharm.D., BCACP  Medication Therapy Management Pharmacist  [facility]       Current Outpatient Medications   Medication Sig Dispense Refill     acetaminophen (TYLENOL) 325 MG tablet Take 2 tablets (650 mg total) by mouth every 4 (four) hours as needed.  0     apixaban (ELIQUIS) 5 mg Tab tablet Take 1 tablet (5 mg total) by mouth 2 (two) times a day. 180 tablet 1     ASCORBIC ACID-MULTIVIT-MIN  ORAL Take 1 tablet by mouth daily.       aspirin 81 MG EC tablet Take 81 mg by mouth at bedtime.       atorvastatin (LIPITOR) 80 MG tablet Take 1 tablet (80 mg total) by mouth at bedtime. 90 tablet 1     betamethasone, augmented, (DIPROLENE-AF) 0.05 % cream Apply 1 application topically as needed.              calcium carbonate-vitamin D3 (CALCIUM 600 + D,3,) 600 mg(1,500mg) -400 unit per tablet Take 1 tablet by mouth daily.       carboxymethylcellulose (REFRESH PLUS) 0.5 % Dpet ophthalmic dropperette Administer 1 drop to both eyes at bedtime.       glucosamine-chondroitin (OSTEO BI-FLEX) 250-200 mg Tab Take 2 tablets by mouth daily.              hydrocortisone valerate (WEST-SAVANAH) 0.2 % ointment Apply 1 application topically as needed (uses on hands and ankle).              ketoconazole (NIZORAL) 2 % cream Apply 1 application topically as needed. Apply a thin layer to affected area(s) twice daily as needed  For athletes foot             krill-om-3-dha-epa-phospho-ast (MEGARED OMEGA-3 KRILL OIL) 926-293-38-64 mg cap Take 1 capsule by mouth daily.       metoprolol tartrate (LOPRESSOR) 50 MG tablet Take 0.5 tablets (25 mg total) by mouth 2 (two) times a day. 180 tablet 1     multivitamin therapeutic tablet Take 1 tablet by mouth daily.       omeprazole (PRILOSEC) 20 MG capsule Take 20 mg by mouth 2 (two) times a day.              QUEtiapine (SEROQUEL) 50 MG tablet Take 1 tablet (50 mg total) by mouth at bedtime as needed. 20 tablet 0     s-adenosylmethionine (CECY-E) 400 mg Tab Take 1 tablet by mouth daily.       VENTOLIN HFA 90 mcg/actuation inhaler Inhale 1 puff as needed.        vitamin E 400 unit capsule Take 400 Units by mouth daily.       No current facility-administered medications for this visit.

## 2021-06-02 NOTE — TELEPHONE ENCOUNTER
Discussed with pt.  10/15/19 CT Pulmonary Vein has not been read yet.  Message sent to Dr Daugherty to see if he could read, otherwise informed pt he would need to wait until Dr Vee's return.  Pt starts softball this week and doesn't want to be on Eliquis if he doesn't need to be.  -al

## 2021-06-02 NOTE — TELEPHONE ENCOUNTER
----- Message from Solomon Vee MD sent at 10/30/2019  9:43 AM CDT -----  I have called the patient to discuss cardiac CT findings.  Unfortunately, he still has a tunnel above the 5 mm in size in left atrial appendage which is not closed.  The patient has risk of her stroke 3 to 4% annually.  Currently he is on Eliquis 5 mg twice a day.  However, the patient wants to play softball which could hit to his had leading to intracranial bleeding.  We discussed the benefit of risk of anticoagulation.  The patient said he has to play softball even he got intracranial bleeding.  At this time, he is in sinus rhythm.  I rather him hold anticoagulation than have intracranial bleeding.  The patient wants to think about that.  Meantime, I am going to talk to interventional cardiologist to discuss small coil to close the tunnel.  The patient will call me back next week.  Thanks  Zack

## 2021-06-02 NOTE — TELEPHONE ENCOUNTER
----- Message from Richa Germain sent at 10/28/2019 10:50 AM CDT -----  General phone call:    Caller: Patient  Primary cardiologist: Dr. Vee  Detailed reason for call: Patient did not receive a call back from last week about stopping the Eliquis.  He is needing a response this week.  New or active symptoms?   Best phone number: [phone]  Best time to contact: Anytime  Ok to leave a detailed message? Yes  Device? no    Additional Info:

## 2021-06-02 NOTE — PROGRESS NOTES
ITP ASSESSMENT   Assessment Day: 150 Day    Session Number: 36  Precautions: Surgical, falls risk, back/neck pain, sciatica, inguinal hernia, A-Fib    Diagnosis: CAB (CABGx3, Ligation of JUDY (5/1/19))    Risk Stratification: High    Referring Provider: Dr. STEPHANIE Bai  EXERCISE  Exercise Assessment: Discharge       6 Minute Walk Test   Pre   Pre Exercise HR: 92                    Pre Exercise BP: 142/72      Peak  Peak HR: 134-157                   Peak BP: 152/80    Peak feet: 1320    Peak O2 SAT: 96    Peak RPE: 4    Peak MPH: 2.5      Symptoms:  Peak Symptoms: denied symptoms      5 mins. Post  5 Min Post HR: 95    5 Min Post BP: 132/78                           Exercise Plan  Goals Next 30 days  ADL'S: Continue to Increase MET level in CR to 5-6 METs to mock climbing ladders and trimming trees.     Leisure:  Resume interval training on TM when appropriate as pt tolerates to build strength/stamina to mock same level for soft ball ~4.2-6METs    Work: Pt's long-term goal is to play softball by Oct 1st. (walk/jog/run at a ~3.5-4mph) Continue gradual increase in intensity to 4 MET level      Education Goals: All goals in this section met    Education Goals Met: Has system for taking medication.;Medication review.;Patient can state cardiac s/s and appropriate emergency response.                          Goals Met  150+ day ADL'S goals met: Goal not met: pt has not reached 5-6 MET level.  He has reached peak of 3.9 METs on TM.   150+ day Leisure goals met: Goal partially met- pt has done some short jogging intervals on TM.  He has not reached 4.2-6 MET level.   150+ day Work goals met: Goal met- pt has increased METs to 4 and will resume playing softball in 2 weeks  150+ Day Progress: Pt's high HR (he is in A-Fib) limited his progress.  He was not able to progress beyond 4 METs due to irregular rhythm.     150+ day ADL'S goals met: Goal not met: pt has not reached 5-6 MET level.  He has reached peak of 3.9 METs on TM.    150+ day Leisure goals met: Goal partially met- pt has done some short jogging intervals on TM.  He has not reached 4.2-6 MET level.   150+ day Work goals met: Goal met- pt has increased METs to 4 and will resume playing softball in 2 weeks  150+ Day Progress: Pt's high HR (he is in A-Fib) limited his progress.  He was not able to progress beyond 4 METs due to irregular rhythm.     120 day ADL'S goals met: Pt has reached 3.3-3.7 MET level on treadmill. Continue with increasing intensities to 4+ MET level. Pt states he is toleragin cutting and hauling wood without fatigue or SOB    120 day Leisure goals met: Intervals with light jogging deferred past week due to symptoms and ST depression with increased intensity on treadmill.    120 day Work goals met: Pt continues to work towards tolerating jogging short duration (~20 seconds) for running bases in softball. Hoping to start playing softball Oct. 1st.    120 Day Progress: Pt has reached 3.3-3.7 MET level on treadmill.      90 day ADL'S goals met: Pt has reached a 2.8-3 METs on Nustep for ~28-29minutes  and 3.6-4METs on Airdyne for limited duration due to uncomfort seat. Continue with goals to mock yardwork at 3-4+METs    90 day Leisure goals met: Goal met: Pt started l using resistance bands 1-2x/week to increase U/E strength and  started some stretches for flexibility.    90 day Work goals met: Pt has been working on his strength training for all muscle groups for softball. Pt was encouraged to continue to walk as tolerated for longer durations-to help build stamina. Increase home walking from 25' to 30'    90 Day Progress: Pt has been limited with progression due to A-Fib with higher HRs, HTN with sx's. Pt has declined to do certain modalities due to orthopedic limitations, seats being uncomfortable. Continue to offer changes in modalities/levels to progress pt to his LTG 6-8: To be able to walk/jog/sprint and play softball this fall, climb ladders/trimtrees and  use chain saw.       60 day ADL'S goals met: Goal not met- pt has not increased MET level to 3-4 so far, as he has decreased his TM use because of preference.    60 day Leisure goals met: Goal met- pt has increased U/E strength by doing wts 1-2x/week in rehab.      30 day ADL'S goals met: Goal met- pt showering/toweling off independently.    30 day Leisure goals met: Goal met- pt has resumed vacuuming.    30 day Work goals met: Retired      Exercise Prescription  Exercise Mode: Treadmill;Bike;Nustep;Arm Erg.;Hallway Walking;Stairs    Frequency: 2x/week    Duration: 35-45 min    Intensity / THR: 20-30 beats above resting heart rate ([place] for intervals 118-131)    RPE 11-14  Progression / Met level: 3.8-5+    Resistive Training?: Yes      Current Exercise (mins/week): 160      Interventions  Home Exercise:  Mode: Walking, biking    Frequency: 3-4x/week    Duration: 30-60 mins      Education Material : Educational videos;Provide written material;Individual education and counseling;Offer educational classes      Education Completed  Exercise Education Completed: Cardiac Anatomy;Signs and Symptoms;Medication review;RPE;Emergency Plan;Home Exercise;Warm up/cool down;FITT Principles;Stretching;BP/HR Reponse to exercise;Strength training;Benefits of Exercise;End point of exercise              Exercise Follow-up/Discharge  Follow up/Discharge: Pt to continue ex by walking and biking at home.   NUTRITION  Nutrition Assessment: Discharge      Nutrition Risk Factors:  Nutrition Risk Factors: Dyslipidemia;Overweight      Nutrition Plan  Interventions  Diet Consult: Completed    Other Nutrition Intervention: Diet Class;Therapist/Pt Discussion;Educational Videos;Provide with Written Material    Initial Rate Your Plate Score: 44    Pre     Follow-Up Rate Your Plate Score: 50      Education Completed  Nutrition Education Completed: Risk factor overview      Goals  Nutrition Goals (Next 30 days): Patient can identify their risk  "factors for CAD;Patient will follow a low sodium diet;Patient knows appropriate portion size;Patient will follow a low saturated fat diet      Goals Met  Nutrition Goals Met: Reviewed Dietitian schedule;Provided Rate your Plate Survey;Completed Nutritional Risk Screen      Height, Weight, and  BMI  Weight: 229 lb 14.4 oz (104.3 kg)  Height: 6' (1.829 m)  BMI: 31.17    Pre BMI: 31.17     Nutrition Follow-up  Follow-up/Discharge: Pt continues to not be fully engaged in his diet choices.  He reports his wife (who grocery shops and prepares most of his food) is making more heart-healthy choices.  He is eating more fruits and vegetables.  He will eat \"what his wife puts in front of him\" but is not willing to make any changes on his own.         Other Risk Factors  Other Risk Factor Assessment: Discharge      HTN Risk Factor: Hypertension      Pre Exercise BP: 118/74  Post Exercise BP: 102/60      Hypertension Plan  Goals  HTN Goals: Follow low sodium diet      Goals Met  HTN Goals Met: Take medication as prescribed;Exercises regularly      HTN Interventions  HTN Interventions: Diet consult;Therapist/patient discussion;Provide written material;Offer educational classes      HTN Education Completed  HTN Education Completed: Medication review;Risk factor overview      Tobacco Risk Factor: Tobacco      Initial Use:: 1 cigar/day  Current Use:: 1 cigar/day      Tobacco Plan  Tobacco Goals  Tobacco Goals: Patient not ready to change      Goals Met  Tobacco Goals Met: patient not ready to change      Tobacco Interventions  Tobacco Interventions: Therapist/patient discussion      Tobacco Education Completed  Tobacco Education Completed: Risk factor overview      Risk Factor Follow-up   Follow-up/Discharge: Pt continues to not be willing to stop smoking, as he states it helps calm him down     PSYCHOSOCIAL  Psychosocial Assessment: Discharge       Lolly VERGARA Q of L Summary Score: 14    Pre       PHQ-9 Total Score: 0    Pre   "     Psychosocial Risk Factor: NA      Psychosocial Plan  Interventions  Interventions: Offer Spiritual Care consult;Offer educational videos and classes;Provide written material;Individual education and counseling      Education Completed  Education Completed: Effects of stress on body;S/S of depression;Relaxation/Coping Techniques      Goals  Goals (Next 30 days): Improvement in [employer] COOP score;Patient demonstrates understanding of stress, no goals identified for the next 30 days      Goals Met  Goals Met: Identified Support system;Identify stressors;Practicing stress management skills      Psychosocial Follow-up  Follow-up/Discharge: Pt reports that he continues smoking 1 cigar/day because it helps calm him down  He denies stress but does feel anxious to return to softball.             Patient involved in Goal setting?: Yes      Signature: _____________________________________________________________    Date: __________________    Time: __________________

## 2021-06-02 NOTE — TELEPHONE ENCOUNTER
----- Message -----  From: Solomon Vee MD  Sent: 10/29/2019  10:11 AM CDT  To: Gerri Craig RN, Sumit Daugherty MD  Subject: RE: CT Pulmonary Vein                            I will read it tomorrow. CT is done for evaluation if his JUDY is closed well, if it is ok to stop anticoagulation.    Thanks  Zack    ----- Message -----  From: Sumit Daugherty MD  Sent: 10/28/2019   3:28 PM CDT  To: Gerri Craig RN, Solomon Vee MD  Subject: RE: CT Pulmonary Vein                            Cannot pull up the official report to complete it, but have interpreted the study with results below.  Left inferior pulmonary vein: 20 x 15 mm, 2.5 centimeters squared  Left superior pulmonary vein: 27 x 19 mm, 4.0 cm   Right inferior pulmonary vein: 19 x 20 mm, 3.0 cm   Right superior pulmonary vein: 24 x 32 mm, 5.9 cm   Esophagus is located posterior to the body of the left atrium  Extensive coronary calcification is noted.    I hope this is helpful  Sumit Daugherty MD

## 2021-06-03 VITALS — BODY MASS INDEX: 31.38 KG/M2 | WEIGHT: 231.4 LBS

## 2021-06-03 VITALS — BODY MASS INDEX: 30.56 KG/M2 | WEIGHT: 225.4 LBS

## 2021-06-03 VITALS — WEIGHT: 225.1 LBS | HEIGHT: 72 IN | BODY MASS INDEX: 30.49 KG/M2

## 2021-06-03 VITALS — WEIGHT: 228 LBS | BODY MASS INDEX: 30.88 KG/M2 | HEIGHT: 72 IN

## 2021-06-03 VITALS — BODY MASS INDEX: 31.36 KG/M2 | WEIGHT: 231.2 LBS

## 2021-06-03 VITALS — HEIGHT: 72 IN | BODY MASS INDEX: 29.88 KG/M2 | WEIGHT: 220.6 LBS

## 2021-06-03 VITALS — WEIGHT: 230.2 LBS | BODY MASS INDEX: 31.22 KG/M2

## 2021-06-03 VITALS — HEIGHT: 73 IN | WEIGHT: 233.7 LBS | BODY MASS INDEX: 30.97 KG/M2

## 2021-06-03 VITALS — BODY MASS INDEX: 30.76 KG/M2 | WEIGHT: 226.8 LBS

## 2021-06-03 VITALS — WEIGHT: 229.8 LBS | BODY MASS INDEX: 31.17 KG/M2

## 2021-06-03 VITALS — WEIGHT: 224.1 LBS | BODY MASS INDEX: 30.39 KG/M2

## 2021-06-03 VITALS
BODY MASS INDEX: 30.75 KG/M2 | HEIGHT: 72 IN | WEIGHT: 227 LBS | HEIGHT: 72 IN | WEIGHT: 227 LBS | BODY MASS INDEX: 30.75 KG/M2

## 2021-06-03 VITALS — BODY MASS INDEX: 30.34 KG/M2 | WEIGHT: 224 LBS | HEIGHT: 72 IN

## 2021-06-03 VITALS — BODY MASS INDEX: 30.52 KG/M2 | WEIGHT: 225 LBS

## 2021-06-03 VITALS — BODY MASS INDEX: 31.09 KG/M2 | WEIGHT: 229.2 LBS

## 2021-06-03 VITALS — HEIGHT: 72 IN | WEIGHT: 222 LBS | BODY MASS INDEX: 30.07 KG/M2

## 2021-06-03 VITALS — BODY MASS INDEX: 31.13 KG/M2 | WEIGHT: 229.5 LBS | WEIGHT: 229.5 LBS | BODY MASS INDEX: 31.13 KG/M2

## 2021-06-03 VITALS — WEIGHT: 221.4 LBS | BODY MASS INDEX: 30.03 KG/M2

## 2021-06-03 VITALS — WEIGHT: 230 LBS | HEIGHT: 72 IN | BODY MASS INDEX: 31.15 KG/M2

## 2021-06-03 VITALS — BODY MASS INDEX: 31.11 KG/M2 | WEIGHT: 229.4 LBS

## 2021-06-03 VITALS — WEIGHT: 229.7 LBS | BODY MASS INDEX: 31.15 KG/M2

## 2021-06-03 VITALS — BODY MASS INDEX: 31.25 KG/M2 | WEIGHT: 230.4 LBS

## 2021-06-03 VITALS — BODY MASS INDEX: 31.41 KG/M2 | WEIGHT: 231.9 LBS | HEIGHT: 72 IN

## 2021-06-03 VITALS — WEIGHT: 227.8 LBS | BODY MASS INDEX: 30.9 KG/M2

## 2021-06-03 VITALS — BODY MASS INDEX: 30.39 KG/M2 | WEIGHT: 224.1 LBS

## 2021-06-03 VITALS — BODY MASS INDEX: 30.81 KG/M2 | WEIGHT: 227.2 LBS

## 2021-06-03 VITALS — WEIGHT: 233.69 LBS | BODY MASS INDEX: 31.65 KG/M2 | HEIGHT: 72 IN

## 2021-06-03 VITALS — BODY MASS INDEX: 30.92 KG/M2 | WEIGHT: 228 LBS

## 2021-06-03 VITALS — WEIGHT: 229.9 LBS | BODY MASS INDEX: 31.18 KG/M2

## 2021-06-03 VITALS — WEIGHT: 226 LBS | BODY MASS INDEX: 30.65 KG/M2

## 2021-06-03 VITALS — BODY MASS INDEX: 31.32 KG/M2 | WEIGHT: 230.9 LBS

## 2021-06-03 VITALS — BODY MASS INDEX: 30.68 KG/M2 | WEIGHT: 226.2 LBS

## 2021-06-03 VITALS — BODY MASS INDEX: 30.3 KG/M2 | WEIGHT: 223.4 LBS

## 2021-06-03 VITALS — BODY MASS INDEX: 30.62 KG/M2 | WEIGHT: 225.8 LBS

## 2021-06-03 VITALS — WEIGHT: 223.4 LBS | BODY MASS INDEX: 30.29 KG/M2

## 2021-06-03 VITALS — WEIGHT: 232.6 LBS | BODY MASS INDEX: 31.55 KG/M2

## 2021-06-03 VITALS — WEIGHT: 223.6 LBS | BODY MASS INDEX: 30.33 KG/M2

## 2021-06-03 VITALS — BODY MASS INDEX: 31.17 KG/M2 | WEIGHT: 229.8 LBS

## 2021-06-03 VITALS — WEIGHT: 225.6 LBS | BODY MASS INDEX: 30.6 KG/M2

## 2021-06-03 VITALS — WEIGHT: 227.6 LBS | BODY MASS INDEX: 30.87 KG/M2

## 2021-06-03 VITALS — WEIGHT: 227 LBS | BODY MASS INDEX: 30.79 KG/M2

## 2021-06-03 VITALS — BODY MASS INDEX: 31.1 KG/M2 | WEIGHT: 229.3 LBS

## 2021-06-03 VITALS — BODY MASS INDEX: 31.14 KG/M2 | WEIGHT: 229.6 LBS

## 2021-06-04 VITALS
SYSTOLIC BLOOD PRESSURE: 126 MMHG | HEART RATE: 75 BPM | TEMPERATURE: 96.4 F | WEIGHT: 230.2 LBS | DIASTOLIC BLOOD PRESSURE: 86 MMHG | BODY MASS INDEX: 31.22 KG/M2

## 2021-06-05 VITALS
BODY MASS INDEX: 31.42 KG/M2 | WEIGHT: 232 LBS | DIASTOLIC BLOOD PRESSURE: 80 MMHG | SYSTOLIC BLOOD PRESSURE: 130 MMHG | HEART RATE: 68 BPM | RESPIRATION RATE: 14 BRPM | HEIGHT: 72 IN

## 2021-06-08 NOTE — PROGRESS NOTES
ASSESSMENT: Onychauxis, callus, hammertoe, foot pain.    PLAN: Toenails were debrided manually and mechanically x10. Callus debrided ×3. We reviewed options for managing calluses including moisturizer and filing. 15 minute visit, greater than half the time spent counseling and coordinating medical care.        SUBJECTIVE: Toenails are long, thick and painful. Painful calluses between his toes on the left foot. Last nail debridement in the clinic was December 8, 2016.     OBJECTIVE:  General: Pleasant 71 y.o. male in no acute distress.  Vascular: DP pulses are palpable. PT pulses are palpable. Pedal hair is diminished. Feet are warm to the touch.  Neuro: Sensation in the feet is grossly intact to light touch.  Derm:  Toenails are elongated, thickened and dystrophic with discoloration and subungual debris. The painful calluses between his toes were debrided down to intact skin.  Musculoskeletal: Hammertoes.

## 2021-06-08 NOTE — TELEPHONE ENCOUNTER
RN cannot approve Refill Request    RN can NOT refill this medication med is not covered by policy/route to provider     . Last office visit: Visit date not found Last Physical: Visit date not found Last MTM visit: Visit date not found Last visit same specialty: 8/27/2019 Ingris Lyn PA-C.  Next visit within 3 mo: Visit date not found  Next physical within 3 mo: Visit date not found      Margaret Han, [facility] Triage/Med Refill 5/18/2020    Requested Prescriptions   Pending Prescriptions Disp Refills     metoprolol tartrate (LOPRESSOR) 50 MG tablet [Pharmacy Med Name: Metoprolol Tartrate 50 MG Oral Tablet] 90 tablet 3     Sig: Take 1/2 (one-half) tablet by mouth twice daily       There is no refill protocol information for this order

## 2021-06-10 NOTE — PROGRESS NOTES
Patient seen in clinic for QTc ck after Sotalol increased to 120mg BID (refer to 5-3-17 Holter results note) - EKG showed AF with VR @ 64 - QTc 455ms - /76 - patient denied sx of dizziness, increased dyspnea or fatigue, nausea, CP with dose change - patient stated he has persistent SOB which has not worsened - confirmed f/u 11/2017 - patient requested 90day supply if he will be continuing Sotalol Rx at present dose.    Informed patient that update / EKG would be reviewed by Dr. Vee and he would only be contacted with new recommendations, otherwise plan to f/u as previously recommended, sooner if needed - understanding verbalized that new Rx would be sent to preferred pharm as requested.      Please review EKG results - any new orders?  mg

## 2021-06-11 NOTE — PROGRESS NOTES
ASSESSMENT: Onychauxis, callus, hammertoe, foot pain.    PLAN: Toenails were debrided manually and mechanically x10. Callus debrided ×3. We reviewed options for managing calluses including moisturizer and filing. 15 minute visit, greater than half the time spent counseling and coordinating medical care.        SUBJECTIVE: Toenails are long, thick and painful. Painful calluses between his toes on the left foot. Last nail debridement in the clinic was April 13, 2017.     OBJECTIVE:  General: Pleasant 72 y.o. male in no acute distress.  Vascular: DP pulses are palpable. PT pulses are palpable. Pedal hair is diminished. Feet are warm to the touch.  Neuro: Sensation in the feet is grossly intact to light touch.  Derm:  Toenails are elongated, thickened and dystrophic with discoloration and subungual debris. The painful calluses between his toes were debrided down to intact skin.  Musculoskeletal: Hammertoes.

## 2021-06-12 NOTE — PROGRESS NOTES
ASSESSMENT: Onychauxis, callus, hammertoe, foot pain.    PLAN: Toenails were debrided manually and mechanically x10. Callus debrided ×2. We reviewed options for managing calluses including moisturizer and filing. 15 minute visit, greater than half the time spent counseling and coordinating medical care.        SUBJECTIVE: Toenails are long, thick and painful. Painful calluses between his toes on the left foot. Last nail debridement in the clinic was June 21, 2017.  He had a small cut on the left fifth toe since the last visit.  Skin appears to have healed, but the still has occasional pain there.  No new callus there.  No redness or swelling.    OBJECTIVE:  General: Pleasant 72 y.o. male in no acute distress.  Vascular: DP pulses are palpable. PT pulses are palpable. Pedal hair is diminished. Feet are warm to the touch.  Neuro: Sensation in the feet is grossly intact to light touch.  Derm:  Toenails are elongated, thickened and dystrophic with discoloration and subungual debris. The painful calluses on the left first and second toes were debrided down to intact skin.  Musculoskeletal: Hammertoes.

## 2021-06-13 NOTE — PROGRESS NOTES
ASSESSMENT: Onychauxis, callus, hammertoe, foot pain.    PLAN: Toenails were debrided manually and mechanically x10. Callus debrided ×3. We reviewed options for managing calluses including moisturizer and filing.       SUBJECTIVE: Toenails are long, thick and painful. Painful calluses between his toes on the left foot. Last nail debridement in the clinic was August 30, 2017.  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  He treats this with topical antifungal powder.    OBJECTIVE:  General: Pleasant 72 y.o. male in no acute distress.  Vascular: DP pulses are palpable. PT pulses are palpable. Pedal hair is diminished. Feet are warm to the touch.  Neuro: Sensation in the feet is grossly intact to light touch.  Derm:  Toenails are elongated, thickened and dystrophic with discoloration and subungual debris. The painful calluses on the left first, second and fourth toes were debrided down to intact skin.  Musculoskeletal: Hammertoes.

## 2021-06-15 NOTE — PROGRESS NOTES
ASSESSMENT: Onychauxis, foot pain.    PLAN: Toenails were debrided manually and mechanically x10. Return to clinic in 9 weeks.      SUBJECTIVE: Toenails are long, thick and painful. Calluses between his toes on the left foot have resolved. Last nail debridement in the clinic was November 2, 2017.      OBJECTIVE:  General: Pleasant 72 y.o. male in no acute distress.  Vascular: DP pulses are palpable. PT pulses are palpable. Pedal hair is diminished. Feet are warm to the touch.  Neuro: Sensation in the feet is grossly intact to light touch.  Derm:  Toenails are elongated, thickened and dystrophic with discoloration and subungual debris. Skin is thin and shiny, but intact.  Musculoskeletal: Hammertoes.

## 2021-06-18 NOTE — PROGRESS NOTES
ASSESSMENT: Onychauxis, callus, hammertoe, foot pain.    PLAN: Toenails were debrided manually and mechanically x10. Return to clinic in 9 weeks.      SUBJECTIVE: Toenails are long, thick and painful. Painful callus between the left second and third toes has returned.  Last nail debridement in the clinic was March 15, 2018.      OBJECTIVE:  General: Pleasant 73 y.o. male in no acute distress.  Vascular: DP pulses are palpable. PT pulses are palpable. Pedal hair is diminished. Feet are warm to the touch.  Neuro: Sensation in the feet is grossly intact to light touch.  Derm:  Toenails are elongated, thickened and dystrophic with discoloration and subungual debris.  Hyperkeratosis on the medial side of the left third toe where it rubs against the neighboring second toe.  That area was debrided down to intact skin.  Remaining skin is thin and shiny, but intact.  Musculoskeletal: Hammertoes.

## 2021-06-19 NOTE — LETTER
Letter by Leena Cerrato RN at      Author: Leena Cerrato RN Service: -- Author Type: --    Filed:  Encounter Date: 5/1/2019 Status: (Other)       [facility]  [facility]  [address] [address]        Giacomo Schneider Sr.  [address] [address] [place] [id number]    Dear Mr. Schneider,     We have reviewed the Health Care Directive dated 5/1/2019 which you presented for addition to your medical record. Unfortunately, our review indicates the document is not legally valid for   the following reason: Both witnesses are employees of the healthcare system caring for you.    In order to create a legal document you will need to have your signature re-witnessed or have it notarized. Only one of your 2 witnesses can be employed by our health care system by state law. In addition, notaries and witnesses cannot be named as your health care agents.     We greatly value the opportunity to assist you in documenting your choices and to honor your   wishes. We apologize for any inconvenience. We have several options to assist you in updating   your document so it meets legal requirements.       Health Care Directives and Advance Care Planning resources can be viewed and printed  for free at our web site:[email or web address].     Free group classes on Advance Care Planning and completing a Health Care Directive are  available at multiple locations and times. These classes are led by trained staff who will   provide information and guide you through a Health Care Directive. They can also review, notarize and add your Health Care Directive to your medical record. [place] for a class at [email or web address] or by calling [employer] at [phone] or toll free [phone].    COPIES of completed Health Care Directives can be brought or mailed to any of our   locations, including the address listed below. You can also email a copy to  [email or web address]     For additional assistance or questions you can email us at [email or web address] or call [phone]    Sincerely,     Pasha Alex Advance Care Planning  [facility], [facility] and [address] [address]   Email us: [email or web address] Call us: [phone]  Visit at: [email or web address]

## 2021-06-19 NOTE — LETTER
Letter by Antonina Reyes PharmD at      Author: Antonina Reyes PharmD Service: -- Author Type: --    Filed:  Encounter Date: 10/9/2019 Status: Signed             10/21/2019      Giacomo Schneider  [address] [place] [address]  [place] [address]            Dear Giacomo Schneider     Thank you for talking with me on 10/9/19 about your health and medications. [facility] MTM (Medication Therapy Management) program helps you understand your medications and use them safely.    Along with this letter are an action plan (Medication Action Plan) and a medication list (Personal Medication List). The action plan has steps you should take to help you get the best results from your medications. The medication list will help you keep track of your medications and how to use them the right way.       Have your action plan and medication list with you when you talk with your doctors, pharmacists, and other health care providers.    Ask your doctors, pharmacists, and other healthcare providers to update them at every visit.     Take your medication list with you if you go to the hospital or emergency room.     Give a copy of the action plan and medication list to your family or caregivers.     If you want to talk about this letter or any of the papers with it, please call [phone]. We look forward to working with you, your doctors, and other healthcare providers to help you stay healthy.    Sincerely,     Antonina Reyes    _  Medication Action Plan For Giacomo Schneider Sr., : 1945     This action plan will help you get the best results from your medications if you:     1. Read What we talked about.   2. Take the steps listed in the What I need to do boxes.   3. Fill in What I did and when I did it.   4. Fill in My follow-up plan and Questions I want to ask.     Have this action plan with you when you talk with your doctors, pharmacists, and other healthcare providers in your care team. Share this with your family or  caregivers too.    Date Prepared: 10/21/2019      What we talked about:       What I need to do:       What I did and when I did it:          What we talked about:       What I need to do:       What I did and when I did it:          What we talked about:       What I need to do:       What I did and when I did it:         My follow-up plan (add notes about next steps):            Questions I want to ask (include topics about medications or therapy):          If you have any questions about your action plan, call Antonina Reyes at [phone], Monday-Friday 8:00-4:30pm  _  This medication list was made for you after we talked. We also used information from your doctors chart.      Use blank rows to add new medications. Then fill in the dates you started using them.     Cross out medications when you no longer use them. Then write the date and why you stopped using them.     Ask your doctors, pharmacists, and other healthcare providers to update this list at every visit.  Keep this list up-to-date with:  ? prescription medications  ? over the counter drugs  ? herbals  ? vitamins  ? minerals          If you go to the hospital or emergency room, take this list with you. Share this with your family or caregivers too.    Date Prepared: 10/21/2019      Allergies or side effects: budesonide; sulfa (sulfonamide antibiotics); thimerosal      Medication: acetaminophen (TYLENOL) 325 MG tablet      How I use it: Take 2 tablets (650 mg total) by mouth every 4 (four) hours as needed.      Why I use it: S/P CABG (Coronary Artery Bypass Graft)    Prescriber: Ingris Lyn PA-C      Date I started using it:     Date I stopped using it:       Why I stopped using it:          Medication: apixaban (ELIQUIS) 5 mg Tab tablet      How I use it: Take 1 tablet (5 mg total) by mouth 2 (two) times a day.      Why I use it: S/P CABG (Coronary Artery Bypass Graft)    Prescriber: Solomon Vee MD      Date I started using it:     Date I  stopped using it:       Why I stopped using it:          Medication: ascorbic acid, vitamin C, (VITAMIN C) 1000 MG tablet      How I use it: Take 1,000 mg by mouth daily.      Why I use it: Supplement    Prescriber: Dr. Giovani Ponce      Date I started using it:     Date I stopped using it:       Why I stopped using it:          Medication: aspirin 81 MG EC tablet      How I use it: Take 81 mg by mouth at bedtime.      Why I use it: S/P CABG (Coronary Artery Bypass Graft)    Prescriber: Solomon Vee MD      Date I started using it:     Date I stopped using it:       Why I stopped using it:          Medication: atorvastatin (LIPITOR) 80 MG tablet      How I use it: Take 1 tablet (80 mg total) by mouth at bedtime.      Why I use it: S/P CABG (Coronary Artery Bypass Graft)    Prescriber: Solomon Vee MD      Date I started using it:     Date I stopped using it:       Why I stopped using it:          Medication: betamethasone, augmented, (DIPROLENE-AF) 0.05 % cream      How I use it: Apply 1 application topically as needed.       Why I use it: Dermatitis    Prescriber: Dr. Giovani Ponce      Date I started using it:     Date I stopped using it:       Why I stopped using it:          Medication: calcium carbonate-vitamin D3 (CALCIUM 600 + D,3,) 600 mg(1,500mg) -400 unit per tablet      How I use it: Take 1 tablet by mouth daily.      Why I use it: Bone Health    Prescriber: Dr. Giovani Ponce      Date I started using it:     Date I stopped using it:       Why I stopped using it:          Medication: carboxymethylcellulose (REFRESH PLUS) 0.5 % Dpet ophthalmic dropperette      How I use it: Administer 1 drop to both eyes at bedtime.      Why I use it: Dry Eyes    Prescriber: Dr. Giovani Ponce      Date I started using it:     Date I stopped using it:       Why I stopped using it:          Medication: glucosamine-chondroitin (OSTEO BI-FLEX) 250-200 mg Tab      How I use it: Take 2 tablets by mouth  daily.       Why I use it: Arthritis    Prescriber: Dr. Giovani Ponce      Date I started using it:     Date I stopped using it:       Why I stopped using it:          Medication: hydrocortisone valerate (WEST-SAVANAH) 0.2 % ointment      How I use it: Apply 1 application topically as needed (uses on hands and ankle).       Why I use it: Dermatitis    Prescriber: Dr. Giovani Ponce      Date I started using it:     Date I stopped using it:       Why I stopped using it:          Medication: ketoconazole (NIZORAL) 2 % cream      How I use it: Apply 1 application topically as needed. Apply a thin layer to affected area(s) twice daily as needed  For athletes foot      Why I use it: a fungal infection of skin    Prescriber: Dr. Giovani Ponce      Date I started using it:     Date I stopped using it:       Why I stopped using it:          Medication:  OMEGA-3 KRILL -490-23-64 mg cap      How I use it: Take 1 capsule by mouth daily.      Why I use it: Supplement    Prescriber: Dr. Giovani Ponce      Date I started using it:     Date I stopped using it:       Why I stopped using it:          Medication: metoprolol tartrate (LOPRESSOR) 50 MG tablet      How I use it: Take 0.5 tablets (25 mg total) by mouth 2 (two) times a day.      Why I use it: Persistent Atrial Fibrillation    Prescriber: Mirtha Ballard PA-C      Date I started using it:     Date I stopped using it:       Why I stopped using it:          Medication: multivitamin therapeutic tablet      How I use it: Take 1 tablet by mouth daily.      Why I use it: Supplement     Prescriber: Dr. Giovani Ponce      Date I started using it:     Date I stopped using it:       Why I stopped using it:          Medication: omeprazole (PRILOSEC) 20 MG capsule      How I use it: Take 20 mg by mouth 2 (two) times a day.       Why I use it: Heartburn/Acid Reflux    Prescriber: Dionisio Alvarez MD      Date I started using it:     Date I  stopped using it:       Why I stopped using it:          Medication: s-adenosylmethionine (CECY-E) 400 mg Tab      How I use it: Take 1 tablet by mouth daily.      Why I use it: Supplement    Prescriber: Dr. Giovani Ponce      Date I started using it:     Date I stopped using it:       Why I stopped using it:          Medication: vitamin E 400 unit capsule      How I use it: Take 400 Units by mouth daily.      Why I use it: Supplement    Prescriber: Dr. Giovani Ponce      Date I started using it:     Date I stopped using it:       Why I stopped using it:          Medication:       How I use it:       Why I use it:     Prescriber:       Date I started using it:     Date I stopped using it:       Why I stopped using it:          Medication:       How I use it:       Why I use it:     Prescriber:       Date I started using it:     Date I stopped using it:       Why I stopped using it:          Medication:       How I use it:       Why I use it:     Prescriber:       Date I started using it:     Date I stopped using it:       Why I stopped using it:          Other Information:      If you have any questions about your medication list, call [phone]    According to the Paperwork Reduction Act of 1995, no persons are required to respond to a collection of information unless it displays a valid OMB control number. The valid OMB number for this information collection is 3009-7223. The time required to complete this information collection is estimated to average 37.76 minutes per response, including the time to review instructions, searching existing data resources, gather the data needed, and complete and review the information collection. If you have any comments concerning the accuracy of the time estimate(s) or suggestions for improving this form, please write to: CMS, Attn: RON Reports Clearance Officer, [address] [place] [id number].

## 2021-06-25 NOTE — PROGRESS NOTES
Progress Notes by Solomon Vee MD at 5/2/2017  7:50 AM     Author: Solomon Vee MD Service: -- Author Type: Physician    Filed: 5/2/2017  8:25 AM Encounter Date: 5/2/2017 Status: Signed    : Solomon Vee MD (Physician)           Click to link to [facility] Heart [facility] HEART [facility] NOTE       Assessment/Plan:   A 72-year-old gentleman presents to the clinic today for routine cardiology followup.     1. Paroxysmal atrial fibrillation. The patient's heart rate is controlled very well.  He has no symptoms.  His physical examination indicated the patient has irregular heartbeat, consistent with atrial fibrillation.  A 12-lead ECG (personally reviewed) was confirmed atrial fibrillation with controlled ventricular rate, 69 bpm QT interval 477 ms.   Holter monitor is requested for evaluation of persistent versus paroxysmal atrial fibrillation.  If the patient become a persistent atrial fibrillation, consider to change sotalol to beta-blocker just for rate control.  Discussed the plan with the patient.  His [id number] score is low except for age 72.  Discussed the increased risk of stroke secondary to atrial fibrillation.  The patient would like to continue aspirin.  He fully understands the benefit and risks of anticoagulation and it declined it.  Continue aspirin.  Continue sotalol 80 mg twice a day now.      2. Hyperlipidemia.  Continue Zocor 40 mg nightly.  Recent LDL was 77.    Will follow up [place] report and discuss the findings with the patient.  Thank you for the opportunity to be involved in the care of iGacomo Schneider Sr.. If you have any questions, please feel free to contact me.  I will see the patient again in 6 months.     History of Present Illness:   It is my pleasure to see Mr. Giacomo Schneider today at the [facility] for routine cardiology follow up. Mehrdad is a 72-year-old pleasant gentleman with a medical history of paroxysmal atrial fibrillation,   hyperlipidemia, and  obstructive sleep apnea.     The patient has been doing really well d for the last year. He denies any chest pain, palpitations, shortness of breath, dizziness, orthopnea, PND, or leg swelling. His blood pressure is in the normal range. His heart rate is controlled very well. His cholesterol was also controlled well.  The patient still  softball team and played games sometimes.    Past Medical History:     Patient Active Problem List   Diagnosis   ? Hyperlipidemia   ? Atrial fibrillation   ? Adult Sleep Apnea   ? Esophageal Reflux   ? Bilateral inguinal hernia       Past Surgical History:     Past Surgical History:   Procedure Laterality Date   ? LAPAROSCOPIC INGUINAL HERNIA REPAIR Bilateral 12/3/2015    Procedure: LAPAROSCOPIC BILATERAL INGUINAL HERNIA REPAIR WITH BILATERAL MESH;  Surgeon: Bran Puckett MD;  [facility];  Service:        Family History:     Family History   Problem Relation Age of Onset   ? Hypertension Father    ? Anesthesia problems Neg Hx        Social History:    reports that he has been smoking Cigars.  His smokeless tobacco use includes Chew. He reports that he drinks alcohol. He reports that he does not use illicit drugs.    Review of Systems:   General: WNL  Eyes: WNL  Ears/Nose/Throat: WNL  Lungs: WNL  Heart: WNL  Stomach: WNL  Bladder: WNL  Muscle/Joints: WNL  Skin: WNL  Nervous System: WNL  Mental Health: WNL     Blood: WNL    Meds:     Current Outpatient Prescriptions:   ?  aspirin 325 MG tablet, Take 325 mg by mouth daily., Disp: , Rfl:   ?  hydrocortisone valerate (WEST-SAVANAH) 0.2 % ointment, Apply 1 application topically as needed. , Disp: , Rfl:   ?  ketoconazole (NIZORAL) 2 % cream, Apply a thin layer to affected area(s) twice daily as needed, Disp: , Rfl:   ?  omeprazole (PRILOSEC) 20 MG capsule, Take 20 mg by mouth 2 (two) times a day. , Disp: , Rfl:   ?  simvastatin (ZOCOR) 40 MG tablet, Take 1 tablet (40 mg total) by mouth bedtime., Disp: 90 tablet,  "Rfl: 3  ?  sotalol (BETAPACE) 80 MG tablet, TAKE ONE TABLET BY MOUTH EVERY 12 HOURS, Disp: 180 tablet, Rfl: 3  ?  VENTOLIN HFA 90 mcg/actuation inhaler, Inhale 1 puff as needed. , Disp: , Rfl:   ?  FLUZONE HIGH-DOSE 2016-17, PF, 180 mcg/0.5 mL Syrg injection, TO BE ADMINISTERED BY PHARMACIST FOR IMMUNIZATION, Disp: , Rfl: 0    Allergies:   Budesonide; Sulfa (sulfonamide antibiotics); and Thimerosal      Objective:      Physical Exam  (!) 228 lb (103.4 kg)  6' 0.5\" (1.842 m)  Body mass index is 30.5 kg/(m^2).  /68 (Patient Site: Right Arm, Patient Position: Sitting, Cuff Size: Adult Large)  Pulse 64  Resp 16  Ht 6' 0.5\" (1.842 m)  Wt (!) 228 lb (103.4 kg)  BMI 30.5 kg/m2    General Appearance:   Awake, Alert, No acute distress.   HEENT:  Pupil equal and reactive to light. No scleral icterus; the mucous membranes were moist.   Neck: No cervical bruits. No JVD. No thyromegaly.     Chest: The spine was straight. The chest was symmetric.   Lungs:   Respirations unlabored; Lungs are clear to auscultation. No crackles. No wheezing.   Cardiovascular:   Irregular rhythm and rate, normal first and second heart sounds with no murmurs. No rubs or gallops.    Abdomen:  Soft. No tenderness. Non-distended. Bowels sounds are present   Extremities: Equal tibial pulses. No leg edema.   Skin: No rashes or ulcers. Warm, Dry.   Musculoskeletal: No tenderness. No deformity.   Neurologic: Mood and affect are appropriate. No focal deficits.                    "

## 2021-06-25 NOTE — PROGRESS NOTES
Progress Notes by Solomon Vee MD at 11/30/2017  1:30 PM     Author: Solomon Vee MD Service: -- Author Type: Physician    Filed: 11/30/2017  2:11 PM Encounter Date: 11/30/2017 Status: Signed    : Solomon Vee MD (Physician)           Click to link to [facility] HEART [facility] NOTE       Assessment/Plan:   A 72-year-old gentleman presents to the clinic today for routine cardiology followup.     1.  Persistent atrial fibrillation. The patient's heart rate is controlled very well.  He has no symptoms.  He has been in persistent atrial fibrillation for several months.  Obviously sotalol did not do its job.  Discussed the management of persistent atrial fibrillation.  Since the patient has no symptoms with controlled ventricular rate, will treat him with rate control.  The patient does not want to start the anticoagulation.  He fully understands the risk of stroke, but again he declined to have chronic anticoagulation.  He said he may consider it when he will be 88 years old.  Continue aspirin 325 mg daily.  Discontinued sotalol.  Start metoprolol 50 mg twice a day.  The patient will check his heart rate and blood pressure and to call me back in the next 2 weeks.    2.  Essential hypertension: Again start metoprolol 50 mg twice a day.  The target of his blood pressure less than 140/90 mmHg.    3. Hyperlipidemia.  Continue Zocor 40 mg nightly.  Recent LDL was 77.    Thank you for the opportunity to be involved in the care of Giacomo Schneider Sr.. If you have any questions, please feel free to contact me.  I will see the patient again in 8 months.     History of Present Illness:   It is my pleasure to see Mr. Giacomo Schneider today at the [facility] for routine cardiology follow up. Mehrdad is a 72-year-old pleasant gentleman with a medical history of persistent atrial fibrillation,   hyperlipidemia, and obstructive sleep apnea.     The patient has been doing really well over last 6  months. He denies any chest pain, palpitations, shortness of breath, dizziness, orthopnea, PND, or leg swelling. His blood pressure has been around 150 in systolic. His heart rate is controlled very well.     Past Medical History:     Patient Active Problem List   Diagnosis   ? Mixed hyperlipidemia   ? Atrial fibrillation   ? Adult Sleep Apnea   ? Esophageal Reflux   ? Bilateral inguinal hernia       Past Surgical History:     Past Surgical History:   Procedure Laterality Date   ? LAPAROSCOPIC INGUINAL HERNIA REPAIR Bilateral 12/3/2015    Procedure: LAPAROSCOPIC BILATERAL INGUINAL HERNIA REPAIR WITH BILATERAL MESH;  Surgeon: Bran Puckett MD;  Location: [facility] [place];  Service:        Family History:     Family History   Problem Relation Age of Onset   ? Hypertension Father    ? No Medical Problems Mother    ? No Medical Problems Sister    ? No Medical Problems Brother    ? No Medical Problems Maternal Aunt    ? No Medical Problems Maternal Uncle    ? No Medical Problems Paternal Aunt    ? No Medical Problems Paternal Uncle    ? No Medical Problems Maternal Grandmother    ? No Medical Problems Maternal Grandfather    ? No Medical Problems Paternal Grandmother    ? No Medical Problems Paternal Grandfather    ? Anesthesia problems Neg Hx    ? Cancer Neg Hx    ? Clotting disorder Neg Hx    ? Diabetes Neg Hx    ? Dislocations Neg Hx    ? Osteoporosis Neg Hx    ? Psoriasis Neg Hx    ? Rashes / Skin problems Neg Hx    ? Severe sprains Neg Hx    ? Ulcerative colitis Neg Hx         Social History:    reports that he has been smoking Cigars.  His smokeless tobacco use includes Chew. He reports that he drinks alcohol. He reports that he does not use illicit drugs.    Review of Systems:   General: WNL  Eyes: WNL  Ears/Nose/Throat: WNL  Lungs: WNL  Heart: WNL  Stomach: WNL  Bladder: WNL  Muscle/Joints: WNL  Skin: WNL  Nervous System: WNL  Mental Health: WNL     Blood: WNL    Meds:     Current Outpatient Prescriptions:    ?  aspirin 325 MG tablet, Take 325 mg by mouth daily., Disp: , Rfl:   ?  betamethasone, augmented, (DIPROLENE-AF) 0.05 % cream, , Disp: , Rfl:   ?  hydrocortisone valerate (WEST-SAVANAH) 0.2 % ointment, Apply 1 application topically as needed. , Disp: , Rfl:   ?  ketoconazole (NIZORAL) 2 % cream, Apply a thin layer to affected area(s) twice daily as needed, Disp: , Rfl:   ?  omeprazole (PRILOSEC) 20 MG capsule, Take 20 mg by mouth 2 (two) times a day. , Disp: , Rfl:   ?  simvastatin (ZOCOR) 40 MG tablet, Take 1 tablet (40 mg total) by mouth bedtime., Disp: 90 tablet, Rfl: 3  ?  VENTOLIN HFA 90 mcg/actuation inhaler, Inhale 1 puff as needed. , Disp: , Rfl:   ?  FLUZONE HIGH-DOSE 2016-17, PF, 180 mcg/0.5 mL Syrg injection, TO BE ADMINISTERED BY PHARMACIST FOR IMMUNIZATION, Disp: , Rfl: 0  ?  metoprolol tartrate (LOPRESSOR) 50 MG tablet, Take 1 tablet (50 mg total) by mouth 2 (two) times a day., Disp: 60 tablet, Rfl: 11    Allergies:   Budesonide; Sulfa (sulfonamide antibiotics); and Thimerosal      Objective:      Physical Exam  (!) 225 lb (102.1 kg)  6' (1.829 m)  Body mass index is 30.52 kg/(m^2).  /80 (Patient Site: Left Arm, Patient Position: Sitting, Cuff Size: Adult Large)  Pulse 64  Resp 16  Ht 6' (1.829 m)  Wt (!) 225 lb (102.1 kg)  BMI 30.52 kg/m2    General Appearance:   Awake, Alert, No acute distress.   HEENT:  Pupil equal and reactive to light. No scleral icterus; the mucous membranes were moist.   Neck: No cervical bruits. No JVD. No thyromegaly.     Chest: The spine was straight. The chest was symmetric.   Lungs:   Respirations unlabored; Lungs are clear to auscultation. No crackles. No wheezing.   Cardiovascular:   Irregular rhythm and rate, normal first and second heart sounds with no murmurs. No rubs or gallops.    Abdomen:  Soft. No tenderness. Non-distended. Bowels sounds are present   Extremities: Equal tibial pulses. No leg edema.   Skin: No rashes or ulcers. Warm, Dry.    Musculoskeletal: No tenderness. No deformity.   Neurologic: Mood and affect are appropriate. No focal deficits.         EKG: Personally reviewed  Atrial fibrillation   Abnormal ECG   When compared with ECG of 02-MAY-2017 08:04,   No significant change was found     Cardiac Imaging Studies  Holter on 5-3-2017:  CONCLUSION:  Evidence of persistent atrial fibrillation with controlled ventricular  response.

## 2021-06-26 NOTE — PROGRESS NOTES
Progress Notes by Solomon Vee MD at 8/13/2018 10:10 AM     Author: Solomon Vee MD Service: -- Author Type: Physician    Filed: 8/13/2018 10:45 AM Encounter Date: 8/13/2018 Status: Signed    : Solomon Vee MD (Physician)           Click to link to [facility] HEART [facility] NOTE       Assessment/Plan:   A 72-year-old gentleman presents to the clinic today for routine cardiology followup.     1.  Persistent atrial fibrillation. The patient's heart rate has been controlled very well.  He had no palpitations, fatigue and shortness of breath.    Continue metoprolol 50 mg twice a day.  Continue aspirin 325 mg daily.  We discussed anticoagulation again due to [id number] score is 3.  He declined chronic anticoagulation again.    2.  Essential hypertension: His blood pressure has been well controlled with metoprolol 50 mg twice a day.  The target of his blood pressure less than 140/90 mmHg.    3. Hyperlipidemia.  Continue Zocor 40 mg nightly.  LDL was controlled.    Thank you for the opportunity to be involved in the care of Giacomo Schneider Sr.. If you have any questions, please feel free to contact me.  I will see the patient again in 12 months.     History of Present Illness:   It is my pleasure to see Mr. Giacomo Schneider today at the [facility] for routine cardiology follow up. Mehrdad is a 73-year-old pleasant gentleman with a medical history of persistent atrial fibrillation,   hyperlipidemia, and obstructive sleep apnea.     The patient has been fine over last 8 months. He had no chest pain, palpitations, shortness of breath, dizziness, orthopnea, PND, or leg swelling. His blood pressure and heart rate have been controlled well.  He plays softball twice a week without symptoms.     Past Medical History:     Patient Active Problem List   Diagnosis   ? Mixed hyperlipidemia   ? Atrial fibrillation (H)   ? Adult Sleep Apnea   ? Esophageal Reflux   ? Bilateral inguinal hernia        Past Surgical History:     Past Surgical History:   Procedure Laterality Date   ? LAPAROSCOPIC INGUINAL HERNIA REPAIR Bilateral 12/3/2015    Procedure: LAPAROSCOPIC BILATERAL INGUINAL HERNIA REPAIR WITH BILATERAL MESH;  Surgeon: Bran Puckett MD;  Location: [facility];  Service:        Family History:     Family History   Problem Relation Age of Onset   ? Hypertension Father    ? No Medical Problems Mother    ? No Medical Problems Sister    ? No Medical Problems Brother    ? No Medical Problems Maternal Aunt    ? No Medical Problems Maternal Uncle    ? No Medical Problems Paternal Aunt    ? No Medical Problems Paternal Uncle    ? No Medical Problems Maternal Grandmother    ? No Medical Problems Maternal Grandfather    ? No Medical Problems Paternal Grandmother    ? No Medical Problems Paternal Grandfather    ? Anesthesia problems Neg Hx    ? Cancer Neg Hx    ? Clotting disorder Neg Hx    ? Diabetes Neg Hx    ? Dislocations Neg Hx    ? Osteoporosis Neg Hx    ? Psoriasis Neg Hx    ? Rashes / Skin problems Neg Hx    ? Severe sprains Neg Hx    ? Ulcerative colitis Neg Hx         Social History:    reports that he has been smoking Cigars.  His smokeless tobacco use includes Chew. He reports that he drinks alcohol. He reports that he does not use illicit drugs.    Review of Systems:   General: WNL  Eyes: WNL  Ears/Nose/Throat: WNL  Lungs: WNL  Heart: WNL  Stomach: WNL  Bladder: WNL  Muscle/Joints: WNL  Skin: WNL  Nervous System: WNL  Mental Health: WNL     Blood: WNL    Meds:     Current Outpatient Prescriptions:   ?  aspirin 325 MG tablet, Take 325 mg by mouth daily., Disp: , Rfl:   ?  betamethasone, augmented, (DIPROLENE-AF) 0.05 % cream, , Disp: , Rfl:   ?  hydrocortisone valerate (WEST-SAVANAH) 0.2 % ointment, Apply 1 application topically as needed. , Disp: , Rfl:   ?  ketoconazole (NIZORAL) 2 % cream, Apply a thin layer to affected area(s) twice daily as needed, Disp: , Rfl:   ?  metoprolol tartrate  (LOPRESSOR) 50 MG tablet, Take 1 tablet (50 mg total) by mouth 2 (two) times a day., Disp: 60 tablet, Rfl: 11  ?  omeprazole (PRILOSEC) 20 MG capsule, Take 20 mg by mouth 2 (two) times a day. , Disp: , Rfl:   ?  simvastatin (ZOCOR) 40 MG tablet, Take 1 tablet (40 mg total) by mouth bedtime., Disp: 90 tablet, Rfl: 3  ?  VENTOLIN HFA 90 mcg/actuation inhaler, Inhale 1 puff as needed. , Disp: , Rfl:   ?  FLUZONE HIGH-DOSE 2016-17, PF, 180 mcg/0.5 mL Syrg injection, TO BE ADMINISTERED BY PHARMACIST FOR IMMUNIZATION, Disp: , Rfl: 0    Allergies:   Budesonide; Sulfa (sulfonamide antibiotics); and Thimerosal      Objective:      Physical Exam  (!) 230 lb (104.3 kg)  6' (1.829 m)  Body mass index is 31.19 kg/(m^2).  /68 (Patient Site: Left Arm, Patient Position: Sitting, Cuff Size: Adult Regular)  Pulse 60  Resp 12  Ht 6' (1.829 m)  Wt (!) 230 lb (104.3 kg)  BMI 31.19 kg/m2    General Appearance:   Awake, Alert, No acute distress.   HEENT:  Pupil equal and reactive to light. No scleral icterus; the mucous membranes were moist.   Neck: No cervical bruits. No JVD. No thyromegaly.     Chest: The spine was straight. The chest was symmetric.   Lungs:   Respirations unlabored; Lungs are clear to auscultation. No crackles. No wheezing.   Cardiovascular:   Irregular rhythm and rate, normal first and second heart sounds with no murmurs. No rubs or gallops.    Abdomen:  Soft. No tenderness. Non-distended. Bowels sounds are present   Extremities: Equal tibial pulses. No leg edema.   Skin: No rashes or ulcers. Warm, Dry.   Musculoskeletal: No tenderness. No deformity.   Neurologic: Mood and affect are appropriate. No focal deficits.         EKG: Personally reviewed  Atrial fibrillation   Abnormal ECG   When compared with ECG of 02-MAY-2017 08:04,   No significant change was found    Cardiac Imaging Studies  Holter 5-3-2018:  CONCLUSION:  Evidence of persistent atrial fibrillation with controlled  ventricular  response.

## 2021-06-27 NOTE — PROGRESS NOTES
Progress Notes by Solomon Vee MD at 8/21/2019  8:50 AM     Author: Solomon Vee MD Service: -- Author Type: Physician    Filed: 8/21/2019  9:22 AM Encounter Date: 8/21/2019 Status: Signed    : Solomon Vee MD (Physician)           Click to link to [facility] HEART [facility] NOTE          Assessment/Plan:   A 74-year-old gentleman presents to the clinic today for routine cardiology followup.      1.  Coronary artery disease s/p 3 V CABG LIMA to LAD, SVG to D1, SVG to RCA on 5-1-2019: The patient recovered well from his bypass surgery.  No chest pain or shortness of breath.  Continue cardiac rehab.  Continue aspirin, Lipitor, metoprolol.     2.  Persistent atrial fibrillation: The patient's heart rate has been controlled very well.  He had no palpitations and shortness of breath.    Continue metoprolol 25 mg twice a day.     Continue anticoagulation due to [id number] score is 3.    His pulmonary CT reported left atrial appendage was ligated with residual 1.1 cm pouch and 5 mm sized communication between LA and JUDY.  Patient did not reach to the criteria of stopping anticoagulation post JUDY ligation.  Transesophageal echocardiogram is requested in the end of September to see if communication between left atrium and appendage is closed.  At this time, continue Eliquis 5 mg twice a day.     3.  Essential hypertension: His blood pressure has been well controlled with metoprolol 25 mg twice a day.  The target of his blood pressure less than 140/90 mmHg.     3. Hyperlipidemia.  Continue Lipitor 80 mg nightly.  LDL was controlled, last LDL 72.     Thank you for the opportunity to be involved in the care of Giacomo Schneider . If you have any questions, please feel free to contact me.  I will see the patient again in 6 months and as needed.      History of Present Illness:   It is my pleasure to see Mr. Giacomo Schneider today at the [facility] for routine cardiology follow up. Mehrdad  is a 74-year-old pleasant gentleman with a medical history of persistent atrial fibrillation, hyperlipidemia, obstructive sleep apnea, recently diagnosed 2 V coronary artery disease s/p 3 V CABG LIMA to LAD, SVG to D1 and SVG to RCA and JUDY ligation on 5-1-2019 .      The patient states that he has done well since last visit.  He had mild chest discomfort when he moved his chest wall.  He did not have chest pain or chest discomfort during exercise activities. He had no palpitations, shortness of breath, dizziness, orthopnea, PND, or leg swelling. His blood pressure and heart rate have been controlled well.     He had no side effects from his current medications including Eliquis.      Past Medical History:     Patient Active Problem List   Diagnosis   ? Mixed hyperlipidemia   ? Atrial fibrillation (H)   ? Adult Sleep Apnea   ? Esophageal Reflux   ? Bilateral inguinal hernia   ? Essential hypertension   ? FONSECA (dyspnea on exertion)   ? Abnormal cardiovascular stress test   ? Abnormal computed tomography angiography (CTA)   ? Elevated coronary artery calcium score   ? S/P CABG (coronary artery bypass graft)   ? ARF (acute respiratory failure) (H)   ? Anemia due to blood loss, acute   ? Coronary artery disease involving coronary bypass graft of native heart without angina pectoris       Past Surgical History:     Past Surgical History:   Procedure Laterality Date   ? CV CORONARY ANGIOGRAM N/A 4/22/2019    Procedure: Coronary Angiogram;  Surgeon: Qamar Martin MD;  Location: [facility];  Service: Cardiology   ? LAPAROSCOPIC INGUINAL HERNIA REPAIR Bilateral 12/3/2015    Procedure: LAPAROSCOPIC BILATERAL INGUINAL HERNIA REPAIR WITH BILATERAL MESH;  Surgeon: Bran Puckett MD;  Location: [facility] OR;  Service:        Family History:     Family History   Problem Relation Age of Onset   ? Hypertension Father    ? No Medical Problems Mother    ? No Medical Problems Sister    ? No Medical Problems  Brother    ? No Medical Problems Maternal Aunt    ? No Medical Problems Maternal Uncle    ? No Medical Problems Paternal Aunt    ? No Medical Problems Paternal Uncle    ? No Medical Problems Maternal Grandmother    ? No Medical Problems Maternal Grandfather    ? No Medical Problems Paternal Grandmother    ? No Medical Problems Paternal Grandfather    ? Anesthesia problems Neg Hx    ? Cancer Neg Hx    ? Clotting disorder Neg Hx    ? Diabetes Neg Hx    ? Dislocations Neg Hx    ? Osteoporosis Neg Hx    ? Psoriasis Neg Hx    ? Rashes / Skin problems Neg Hx    ? Severe sprains Neg Hx    ? Ulcerative colitis Neg Hx         Social History:    reports that he has been smoking cigars.  He has quit using smokeless tobacco. His smokeless tobacco use included chew. He reports that he drinks alcohol. He reports that he does not use drugs.    Review of Systems:   General: WNL  Eyes: WNL  Ears/Nose/Throat: WNL  Lungs: WNL  Heart: WNL  Stomach: WNL  Bladder: WNL  Muscle/Joints: WNL  Skin: WNL  Nervous System: WNL  Mental Health: WNL     Blood: Easy Bruising    Meds:     Current Outpatient Medications:   ?  apixaban (ELIQUIS) 5 mg Tab tablet, Take 1 tablet (5 mg total) by mouth 2 (two) times a day., Disp: 180 tablet, Rfl: 1  ?  ASCORBIC ACID-MULTIVIT-MIN ORAL, Take 1 tablet by mouth daily., Disp: , Rfl:   ?  aspirin 81 MG EC tablet, Take 81 mg by mouth at bedtime., Disp: , Rfl:   ?  atorvastatin (LIPITOR) 80 MG tablet, Take 1 tablet (80 mg total) by mouth at bedtime., Disp: 90 tablet, Rfl: 1  ?  calcium carbonate-vitamin D3 (CALCIUM 600 + D,3,) 600 mg(1,500mg) -400 unit per tablet, Take 1 tablet by mouth daily., Disp: , Rfl:   ?  glucosamine-chondroitin (OSTEO BI-FLEX) 250-200 mg Tab, Take 2 tablets by mouth daily.    , Disp: , Rfl:   ?  krill-om-3-dha-epa-phospho-ast (MEGARED OMEGA-3 KRILL OIL) 235-007-65-64 mg cap, Take 1 capsule by mouth daily., Disp: , Rfl:   ?  metoprolol tartrate (LOPRESSOR) 50 MG tablet, Take 0.5 tablets (25  mg total) by mouth 2 (two) times a day., Disp: 180 tablet, Rfl: 1  ?  multivitamin therapeutic tablet, Take 1 tablet by mouth daily., Disp: , Rfl:   ?  omeprazole (PRILOSEC) 20 MG capsule, Take 20 mg by mouth 2 (two) times a day.    , Disp: , Rfl:   ?  s-adenosylmethionine (CECY-E) 400 mg Tab, Take 1 tablet by mouth daily., Disp: , Rfl:   ?  vitamin E 400 unit capsule, Take 400 Units by mouth daily., Disp: , Rfl:   ?  acetaminophen (TYLENOL) 325 MG tablet, Take 2 tablets (650 mg total) by mouth every 4 (four) hours as needed., Disp: , Rfl: 0  ?  betamethasone, augmented, (DIPROLENE-AF) 0.05 % cream, Apply 1 application topically as needed.    , Disp: , Rfl:   ?  carboxymethylcellulose (REFRESH PLUS) 0.5 % Dpet ophthalmic dropperette, Administer 1 drop to both eyes at bedtime., Disp: , Rfl:   ?  hydrocortisone valerate (WEST-SAVANAH) 0.2 % ointment, Apply 1 application topically as needed (uses on hands and ankle).    , Disp: , Rfl:   ?  ketoconazole (NIZORAL) 2 % cream, Apply 1 application topically as needed. Apply a thin layer to affected area(s) twice daily as needed  For athletes foot   , Disp: , Rfl:   ?  QUEtiapine (SEROQUEL) 50 MG tablet, Take 1 tablet (50 mg total) by mouth at bedtime as needed., Disp: 20 tablet, Rfl: 0  ?  VENTOLIN HFA 90 mcg/actuation inhaler, Inhale 1 puff as needed. , Disp: , Rfl:     Allergies:   Budesonide; Sulfa (sulfonamide antibiotics); and Thimerosal      Objective:      Physical Exam  (!) 228 lb (103.4 kg)  6' (1.829 m)  Body mass index is 30.92 kg/m .  /74 (Patient Site: Left Arm, Patient Position: Sitting, Cuff Size: Adult Large)   Pulse 74   Resp 18   Ht 6' (1.829 m)   Wt (!) 228 lb (103.4 kg) Comment: With shoes  BMI 30.92 kg/m      General Appearance:   Awake, Alert, No acute distress.   HEENT:  Pupil equal and reactive to light. No scleral icterus; the mucous membranes were moist.   Neck: No cervical bruits. No JVD. No thyromegaly.     Chest: The spine was straight.  The chest was symmetric.   Lungs:   Respirations unlabored; Lungs are clear to auscultation. No crackles. No wheezing.   Cardiovascular:   Regular rhythm and rate, normal first and second heart sounds with no murmurs. No rubs or gallops.    Abdomen:  Soft. No tenderness. Non-distended. Bowels sounds are present   Extremities: Equal tibial pulses. No leg edema.   Skin: No rashes or ulcers. Warm, Dry.   Musculoskeletal: No tenderness. No deformity.   Neurologic: Mood and affect are appropriate. No focal deficits.         EKG: Personally reviewed  Sinus rhythm with Fusion complexes   Right bundle branch block   Abnormal ECG   When compared with ECG of 30-APR-2019 08:56,   Sinus rhythm has replaced Atrial fibrillation   Right bundle branch block is now Present   QT has lengthened     Cardiac Imaging Studies  ECHO on 4-:    No previous study for comparison.    Left ventricle ejection fraction is normal. The estimated left ventricular ejection fraction is 55%.    Normal left ventricular size.    Normal right ventricular size and systolic function.    Mild mitral regurgitation     Coronary angiogram on 4-:  Angiography via left radial  LM normal  LAD mid 100% with collaterals via diagonal; diagonal with 90% lesion  Circ large mild dz  RCA 70% distal RCA segment    CT pulmonary vein with contrast to evaluate JUDY on 6-:  1.  Left atrial appendage was ligated, but left a pouch 1.1 cm in depth and a 5 mm unsealed tunnel in inferior of left atrial appendage.   No evidence of JUDY thrombus formation.  2.  Moderately enlarged both atria.  3.  Normal left and right ventricular size and function.  4.  Diffuse coronary artery disease.  Mild disease in LM, occluded mid LAD and severe disease in D1.  Patent LIMA to distal LAD.  SVG to D1 is patent, but not able to see well after anastomosis, cannot exclude the occlusion.  Occluded mid RCA.  SVG to PDA is patent, PDA is a small vessel.    Lab Review   Lab Results    Component Value Date     (L) 05/05/2019    K 3.6 05/06/2019    CL 95 (L) 05/05/2019    CO2 25 05/05/2019    BUN 23 05/05/2019    CREATININE 1.04 05/05/2019    CALCIUM 9.5 05/05/2019     Lab Results   Component Value Date    WBC 9.6 05/02/2019    HGB 10.8 (L) 05/02/2019    HCT 33.4 (L) 05/02/2019    MCV 94 05/02/2019     05/05/2019     Lab Results   Component Value Date    CHOL 135 04/22/2019     Lab Results   Component Value Date    HDL 42 04/22/2019     Lab Results   Component Value Date    LDLCALC 78 04/22/2019     Lab Results   Component Value Date    TRIG 74 04/22/2019

## 2021-06-27 NOTE — PROGRESS NOTES
Progress Notes by Solomon Vee MD at 6/20/2019 10:50 AM     Author: Solomon Vee MD Service: -- Author Type: Physician    Filed: 6/20/2019 11:24 AM Encounter Date: 6/20/2019 Status: Signed    : Solomon Vee MD (Physician)           Click to link to [facility] NOTE       Assessment/Plan:   A 74-year-old gentleman presents to the clinic today for routine cardiology followup post hospital discharge.     1.  Coronary artery disease s/p 3 V CABG LIMA to LAD, SVG to D1, SVG to RCA on 5-1-2019: The patient recovered well from his bypass surgery.  No chest pain or shortness of breath.  Continue cardiac rehab.  Continue aspirin, Lipitor, metoprolol.    2.  Persistent atrial fibrillation: The patient's heart rate has been controlled very well.  He had no palpitations and shortness of breath.    Continue metoprolol 50 mg twice a day.     We discussed anticoagulation again due to [id number] score is 3.  He declined chronic anticoagulation in the past, again would like to off anticoagulation in the future.  He had JUDY ligation during surgery.  He had CT scan yesterday, report is not available at this time.  Since the patient had CABG and JUDY ligation, continue anticoagulation for 3 months post surgery.  If his JUDY is ligated well with low risk of thrombus formation and then we will consider him to off anticoagulation as he requested.    3.  Essential hypertension: His blood pressure has been well controlled with metoprolol 50 mg twice a day.  The target of his blood pressure less than 140/90 mmHg.    3. Hyperlipidemia.  Continue Lipitor 80 mg nightly.  LDL was controlled.    Thank you for the opportunity to be involved in the care of Giacomo Schneider Sr.. If you have any questions, please feel free to contact me.  I will see the patient again in 2 months.     History of Present Illness:   It is my pleasure to see Mr. Giacomo Schneider today at the [facility] for routine  cardiology follow up post hospital discharge. Mehrdad is a 74-year-old pleasant gentleman with a medical history of persistent atrial fibrillation, hyperlipidemia, obstructive sleep apnea, recently diagnosed 2 V coronary artery disease s/p 3 V CABG LIMA to LAD, SVG to D1 and SVG to RCA and JUDY ligation on 5-1-2019 .     The patient states that he has done well since hospital discharge.  He had burning and discomfort at incision site. He had no palpitations, shortness of breath, dizziness, orthopnea, PND, or leg swelling. His blood pressure and heart rate have been controlled well.        Past Medical History:     Patient Active Problem List   Diagnosis   ? Mixed hyperlipidemia   ? Atrial fibrillation (H)   ? Adult Sleep Apnea   ? Esophageal Reflux   ? Bilateral inguinal hernia   ? Essential hypertension   ? FONSECA (dyspnea on exertion)   ? Abnormal cardiovascular stress test   ? Abnormal computed tomography angiography (CTA)   ? Elevated coronary artery calcium score   ? S/P CABG (coronary artery bypass graft)   ? ARF (acute respiratory failure) (H)   ? Anemia due to blood loss, acute       Past Surgical History:     Past Surgical History:   Procedure Laterality Date   ? CV CORONARY ANGIOGRAM N/A 4/22/2019    Procedure: Coronary Angiogram;  Surgeon: Qamar Martin MD;  Location: [facility];  Service: Cardiology   ? LAPAROSCOPIC INGUINAL HERNIA REPAIR Bilateral 12/3/2015    Procedure: LAPAROSCOPIC BILATERAL INGUINAL HERNIA REPAIR WITH BILATERAL MESH;  Surgeon: Bran Puckett MD;  Location: [facility] OR;  Service:        Family History:     Family History   Problem Relation Age of Onset   ? Hypertension Father    ? No Medical Problems Mother    ? No Medical Problems Sister    ? No Medical Problems Brother    ? No Medical Problems Maternal Aunt    ? No Medical Problems Maternal Uncle    ? No Medical Problems Paternal Aunt    ? No Medical Problems Paternal Uncle    ? No Medical Problems Maternal  Grandmother    ? No Medical Problems Maternal Grandfather    ? No Medical Problems Paternal Grandmother    ? No Medical Problems Paternal Grandfather    ? Anesthesia problems Neg Hx    ? Cancer Neg Hx    ? Clotting disorder Neg Hx    ? Diabetes Neg Hx    ? Dislocations Neg Hx    ? Osteoporosis Neg Hx    ? Psoriasis Neg Hx    ? Rashes / Skin problems Neg Hx    ? Severe sprains Neg Hx    ? Ulcerative colitis Neg Hx         Social History:    reports that he has been smoking cigars.  He has quit using smokeless tobacco. His smokeless tobacco use included chew. He reports that he drinks alcohol. He reports that he does not use drugs.    Review of Systems:   General: WNL  Eyes: WNL  Ears/Nose/Throat: WNL  Lungs: WNL  Heart: WNL  Stomach: WNL  Bladder: WNL  Muscle/Joints: WNL  Skin: WNL  Nervous System: WNL  Mental Health: WNL     Blood: WNL    Meds:     Current Outpatient Medications:   ?  acetaminophen (TYLENOL) 325 MG tablet, Take 2 tablets (650 mg total) by mouth every 4 (four) hours as needed., Disp: , Rfl: 0  ?  apixaban (ELIQUIS) 5 mg Tab tablet, Take 1 tablet (5 mg total) by mouth 2 (two) times a day., Disp: 60 tablet, Rfl: 0  ?  ASCORBIC ACID-MULTIVIT-MIN ORAL, Take 1 tablet by mouth daily., Disp: , Rfl:   ?  aspirin 81 MG EC tablet, Take 81 mg by mouth at bedtime., Disp: , Rfl:   ?  atorvastatin (LIPITOR) 80 MG tablet, Take 1 tablet (80 mg total) by mouth at bedtime., Disp: 90 tablet, Rfl: 1  ?  betamethasone, augmented, (DIPROLENE-AF) 0.05 % cream, Apply 1 application topically as needed.    , Disp: , Rfl:   ?  calcium carbonate-vitamin D3 (CALCIUM 600 + D,3,) 600 mg(1,500mg) -400 unit per tablet, Take 1 tablet by mouth daily., Disp: , Rfl:   ?  carboxymethylcellulose (REFRESH PLUS) 0.5 % Dpet ophthalmic dropperette, Administer 1 drop to both eyes at bedtime., Disp: , Rfl:   ?  glucosamine-chondroitin (OSTEO BI-FLEX) 250-200 mg Tab, Take 2 tablets by mouth daily.    , Disp: , Rfl:   ?  hydrocortisone valerate  (WESTSAVANAH) 0.2 % ointment, Apply 1 application topically as needed (uses on hands and ankle).    , Disp: , Rfl:   ?  ketoconazole (NIZORAL) 2 % cream, Apply 1 application topically as needed. Apply a thin layer to affected area(s) twice daily as needed  For athletes foot   , Disp: , Rfl:   ?  krill-om-3-dha-epa-phospho-ast (MEGARED OMEGA-3 KRILL OIL) 802-363-48-64 mg cap, Take 1 capsule by mouth daily., Disp: , Rfl:   ?  metoprolol tartrate (LOPRESSOR) 50 MG tablet, Take 0.5 tablets (25 mg total) by mouth 2 (two) times a day., Disp: 180 tablet, Rfl: 1  ?  multivitamin therapeutic tablet, Take 1 tablet by mouth daily., Disp: , Rfl:   ?  omeprazole (PRILOSEC) 20 MG capsule, Take 20 mg by mouth 2 (two) times a day.    , Disp: , Rfl:   ?  QUEtiapine (SEROQUEL) 50 MG tablet, Take 1 tablet (50 mg total) by mouth at bedtime as needed., Disp: 20 tablet, Rfl: 0  ?  s-adenosylmethionine (CECY-E) 400 mg Tab, Take 1 tablet by mouth daily., Disp: , Rfl:   ?  VENTOLIN HFA 90 mcg/actuation inhaler, Inhale 1 puff as needed. , Disp: , Rfl:   ?  vitamin E 400 unit capsule, Take 400 Units by mouth daily., Disp: , Rfl:   No current facility-administered medications for this visit.     Allergies:   Budesonide; Sulfa (sulfonamide antibiotics); and Thimerosal      Objective:      Physical Exam  (!) 224 lb (101.6 kg)  6' (1.829 m)  Body mass index is 30.38 kg/m .  /72 (Patient Site: Left Arm, Patient Position: Sitting, Cuff Size: Adult Large)   Pulse 70   Resp 18   Ht 6' (1.829 m)   Wt (!) 224 lb (101.6 kg) Comment: With shoes  BMI 30.38 kg/m      General Appearance:   Awake, Alert, No acute distress.   HEENT:  Pupil equal and reactive to light. No scleral icterus; the mucous membranes were moist.   Neck: No cervical bruits. No JVD. No thyromegaly.     Chest: The spine was straight. The chest was symmetric. Incision healed well.   Lungs:   Respirations unlabored; Lungs are clear to auscultation. No crackles. No wheezing.    Cardiovascular:   Regular rhythm and rate, normal first and second heart sounds with no murmurs. No rubs or gallops.    Abdomen:  Soft. No tenderness. Non-distended. Bowels sounds are present   Extremities: Equal tibial pulses. No leg edema.   Skin: No rashes or ulcers. Warm, Dry.   Musculoskeletal: No tenderness. No deformity.   Neurologic: Mood and affect are appropriate. No focal deficits.         EKG: Personally reviewed  Sinus rhythm with Fusion complexes   Right bundle branch block   Abnormal ECG   When compared with ECG of 30-APR-2019 08:56,   Sinus rhythm has replaced Atrial fibrillation   Right bundle branch block is now Present   QT has lengthened    Cardiac Imaging Studies  ECHO on 4-:    No previous study for comparison.    Left ventricle ejection fraction is normal. The estimated left ventricular ejection fraction is 55%.    Normal left ventricular size.    Normal right ventricular size and systolic function.    Mild mitral regurgitation    Coronary angiogram on 4-:  Angiography via left radial  LM normal  LAD mid 100% with collaterals via diagonal; diagonal with 90% lesion  Circ large mild dz  RCA 70% distal RCA segment

## 2021-06-27 NOTE — PROGRESS NOTES
Progress Notes by Mirtha Ballard PA-C at 6/7/2019  3:30 PM     Author: Mirtha Ballard PA-C Service: -- Author Type: Physician Assistant    Filed: 6/7/2019  3:52 PM Encounter Date: 6/7/2019 Status: Signed    : Mirtha Ballard PA-C (Physician Assistant)           Click to link to [facility]     [facility] HEART [facility] NOTE      Assessment/Recommendations     1.  Chronic atrial fibrillation: Has been under rate control strategy for quite some time with metoprolol 50 mg twice daily and no symptoms.  He should continue on his metoprolol, but because we are not trying to convert him to normal sinus rhythm, discontinue amiodarone.  He has refused anticoagulation in the past even though his [id number] score is 3, which would indicate need for protection against strokes.    Now status post left atrial appendage ligation surgically at time of bypass surgery on May 1.  Recommended to patient that he undergo NATALIO for complete evaluation of the left atrial appendage.  If the left atrial appendage is completely abolished with the ligation and there is no residual pouch greater than 1 cm, patient could come off of his Eliquis which was started in the hospital after surgery.  The ultimate decision will be between the patient and Dr. Vee.  He sees Dr. Chi on June 20.    2.  Coronary artery disease: Status post coronary artery bypass grafting x3 on May 1.  Patient complains of no angina, but is quite frustrated with his sternal precautions.  He is doing cardiac rehab.  He should continue aspirin, statin and metoprolol.    LDL was 78 on last lipid profile checked April 2019    3.  Hypotension with fatigue: Blood pressure today quite low and with complaints of ongoing fatigue, will decrease metoprolol to 25 mg twice daily and observe both blood pressure and heart rates.  This can be reevaluated at his clinic visit with Dr. Vee on June 20.     History of Present Illness    Giacomo Schneider Sr.  is a 74 y.o. male who comes in today for follow-up after surgical left atrial appendage ligation on May 1, 2019.  His wife accompanies him to the visit.    Giacomo Schneider Sr. has a past history of persistent atrial fibrillation, hypertension and dyslipidemia.  The patient was having no symptoms of angina or shortness of breath, but primary care physician ordered a stress test and this came back abnormal.  He was then seen in [facility] and it was recommended that he undergo a CTA coronary with calcium score.  This came back showing significant blockage and he was scheduled for coronary angiogram.  The angiogram confirmed severe two-vessel coronary artery disease with  of the mid LAD and surgery was consulted.    On May 1, patient underwent coronary artery bypass grafting x3 along with left atrial appendage ligation because of his history of chronic atrial fibrillation.  He continues on sternal precautions which makes him quite angry since he is normally an active gentleman and wants to be out in his yard working.  He has started cardiac rehab.  He complains that he is quite tired a lot of the time ever since surgery.    Giacomo Schneider Sr. denies chest discomfort, palpitations, shortness of breath, paroxysmal nocturnal dyspnea, orthopnea, lightheadedness, dizziness, pre-syncope, or syncope.  Giacomo Schneider Sr. also denies any weight loss, changes in appetite, nausea or vomiting.     Medical, surgical, family, social history, and medications were reviewed and updated as necessary.         Physical Examination Review of Systems   Vitals:    06/07/19 1510   BP: 90/60   Pulse: 60   Resp: 20     Body mass index is 30.52 kg/m .  Wt Readings from Last 3 Encounters:   06/07/19 (!) 225 lb 1.6 oz (102.1 kg)   06/05/19 (!) 225 lb 9.6 oz (102.3 kg)   06/03/19 (!) 226 lb (102.5 kg)       General Appearance:   Alert, cooperative and in no acute distress   ENT/Mouth: membranes moist, no oral lesions or bleeding gums.      EYES:   no scleral icterus, normal conjunctivae   Neck: Thyroid not visualized   Chest/Lungs:   lungs are clear to auscultation, no rales or wheezing   Cardiovascular:   Irregular . Normal first and second heart sounds with no murmurs, rubs or gallops; the carotid, radial and posterior tibial pulses are intact, no edema bilaterally    Abdomen:  Soft and nontender. Bowel sounds are present in all quadrants   Extremities: no cyanosis or clubbing   Skin: no xanthelasma, warm.    Neurologic: normal gait, normal  bilateral, no tremors   Psychiatric: Normal mood and affect    General: WNL  Eyes: WNL  Ears/Nose/Throat: WNL  Lungs: WNL  Heart: Chest Pain, Shortness of Breath with activity  Stomach: WNL  Bladder: WNL  Muscle/Joints: WNL  Skin: WNL  Nervous System: Daytime Sleepiness  Mental Health: WNL     Blood: WNL     Medical History  Surgical History Family History Social History   Past Medical History:   Diagnosis Date   ? Atrial fibrillation (H)    ? BPH (benign prostatic hyperplasia)    ? CAD (coronary artery disease)    ? Cancer (H)     basal cell carcinoma of skin   ? Coronary atherosclerosis    ? Diverticulosis    ? GERD (gastroesophageal reflux disease)    ? H/O cardiovascular stress test     negative 2012   ? Hyperlipemia    ? Impaired fasting glucose    ? Low back pain    ? Sleep apnea     unable to tolerate CPAP   ? Spermatocele     Past Surgical History:   Procedure Laterality Date   ? CV CORONARY ANGIOGRAM N/A 4/22/2019    Procedure: Coronary Angiogram;  Surgeon: Qamar Martin MD;  Location: [facility];  Service: Cardiology   ? LAPAROSCOPIC INGUINAL HERNIA REPAIR Bilateral 12/3/2015    Procedure: LAPAROSCOPIC BILATERAL INGUINAL HERNIA REPAIR WITH BILATERAL MESH;  Surgeon: Bran Puckett MD;  Location: [facility];  Service:     Family History   Problem Relation Age of Onset   ? Hypertension Father    ? No Medical Problems Mother    ? No Medical Problems Sister    ? No Medical  Problems Brother    ? No Medical Problems Maternal Aunt    ? No Medical Problems Maternal Uncle    ? No Medical Problems Paternal Aunt    ? No Medical Problems Paternal Uncle    ? No Medical Problems Maternal Grandmother    ? No Medical Problems Maternal Grandfather    ? No Medical Problems Paternal Grandmother    ? No Medical Problems Paternal Grandfather    ? Anesthesia problems Neg Hx    ? Cancer Neg Hx    ? Clotting disorder Neg Hx    ? Diabetes Neg Hx    ? Dislocations Neg Hx    ? Osteoporosis Neg Hx    ? Psoriasis Neg Hx    ? Rashes / Skin problems Neg Hx    ? Severe sprains Neg Hx    ? Ulcerative colitis Neg Hx     Social History     Socioeconomic History   ? Marital status:      Spouse name: Not on file   ? Number of children: Not on file   ? Years of education: Not on file   ? Highest education level: Not on file   Occupational History   ? Not on file   Social Needs   ? Financial resource strain: Not on file   ? Food insecurity:     Worry: Not on file     Inability: Not on file   ? Transportation needs:     Medical: Not on file     Non-medical: Not on file   Tobacco Use   ? Smoking status: Light Tobacco Smoker     Types: Cigars   ? Smokeless tobacco: Current User     Types: Chew   ? Tobacco comment: 1 cigar per day   Substance and Sexual Activity   ? Alcohol use: Yes     Comment: rarely   ? Drug use: No   ? Sexual activity: Not on file   Lifestyle   ? Physical activity:     Days per week: Not on file     Minutes per session: Not on file   ? Stress: Not on file   Relationships   ? Social connections:     Talks on phone: Not on file     Gets together: Not on file     Attends Orthodox service: Not on file     Active member of club or organization: Not on file     Attends meetings of clubs or organizations: Not on file     Relationship status: Not on file   ? Intimate partner violence:     Fear of current or ex partner: Not on file     Emotionally abused: Not on file     Physically abused: Not on file      Forced sexual activity: Not on file   Other Topics Concern   ? Not on file   Social History Narrative   ? Not on file          Medications  Allergies   Current Outpatient Medications   Medication Sig Dispense Refill   ? acetaminophen (TYLENOL) 325 MG tablet Take 2 tablets (650 mg total) by mouth every 4 (four) hours as needed.  0   ? apixaban (ELIQUIS) 5 mg Tab tablet Take 1 tablet (5 mg total) by mouth 2 (two) times a day. 60 tablet 0   ? aspirin 81 MG EC tablet Take 81 mg by mouth at bedtime.     ? atorvastatin (LIPITOR) 80 MG tablet Take 1 tablet (80 mg total) by mouth at bedtime. 90 tablet 1   ? betamethasone, augmented, (DIPROLENE-AF) 0.05 % cream Apply 1 application topically as needed.            ? carboxymethylcellulose (REFRESH PLUS) 0.5 % Dpet ophthalmic dropperette Administer 1 drop to both eyes at bedtime.     ? hydrocortisone valerate (WEST-SAVANAH) 0.2 % ointment Apply 1 application topically as needed (uses on hands and ankle).            ? ketoconazole (NIZORAL) 2 % cream Apply 1 application topically as needed. Apply a thin layer to affected area(s) twice daily as needed  For athletes foot           ? metoprolol tartrate (LOPRESSOR) 50 MG tablet Take 0.5 tablets (25 mg total) by mouth 2 (two) times a day. 180 tablet 1   ? omeprazole (PRILOSEC) 20 MG capsule Take 20 mg by mouth 2 (two) times a day.            ? QUEtiapine (SEROQUEL) 50 MG tablet Take 1 tablet (50 mg total) by mouth at bedtime as needed. 20 tablet 0   ? VENTOLIN HFA 90 mcg/actuation inhaler Inhale 1 puff as needed.      ? ASCORBIC ACID-MULTIVIT-MIN ORAL Take 1 tablet by mouth daily.     ? calcium carbonate-vitamin D3 (CALCIUM 600 + D,3,) 600 mg(1,500mg) -400 unit per tablet Take 1 tablet by mouth daily.     ? glucosamine-chondroitin (OSTEO BI-FLEX) 250-200 mg Tab Take 2 tablets by mouth daily.            ? krill-om-3-dha-epa-phospho-ast (MEGARED OMEGA-3 KRILL OIL) 181-219-99-64 mg cap Take 1 capsule by mouth daily.     ? multivitamin  therapeutic tablet Take 1 tablet by mouth daily.     ? s-adenosylmethionine (CECY-E) 400 mg Tab Take 1 tablet by mouth daily.     ? vitamin E 400 unit capsule Take 400 Units by mouth daily.       No current facility-administered medications for this visit.       Allergies   Allergen Reactions   ? Budesonide Unknown   ? Sulfa (Sulfonamide Antibiotics) Unknown   ? Thimerosal Unknown         Lab Results    Chemistry/lipid CBC Cardiac Enzymes/BNP/TSH/INR   Lab Results   Component Value Date    CHOL 135 04/22/2019    HDL 42 04/22/2019    LDLCALC 78 04/22/2019    TRIG 74 04/22/2019    CREATININE 1.04 05/05/2019    BUN 23 05/05/2019    K 3.6 05/06/2019     (L) 05/05/2019    CL 95 (L) 05/05/2019    CO2 25 05/05/2019    Lab Results   Component Value Date    WBC 9.6 05/02/2019    HGB 10.8 (L) 05/02/2019    HCT 33.4 (L) 05/02/2019    MCV 94 05/02/2019     05/05/2019    Lab Results   Component Value Date    INR 1.46 (H) 05/02/2019          This note has been dictated using voice recognition software. Any grammatical or context distortions are unintentional and inherent to the software.    Mirtha Ballard PA-C, MPAS  Structural Heart Program  [facility]

## 2021-06-29 NOTE — PROGRESS NOTES
"Progress Notes by Solomon Vee MD at 6/8/2020  3:30 PM     Author: Solomon Vee MD Service: -- Author Type: Physician    Filed: 6/8/2020  3:41 PM Encounter Date: 6/8/2020 Status: Signed    : Solomon Vee MD (Physician)           The patient has been notified of following:     \"This video visit will be conducted via a call between you and your physician/provider. We have found that certain health care needs can be provided without the need for an in-person physical exam.  This service lets us provide the care you need with a video conversation.  If a prescription is necessary we can send it directly to your pharmacy.  If lab work is needed we can place an order for that and you can then stop by our lab to have the test done at a later time.      Patient has given verbal consent to a Video visit? Yes    HEART CARE VIDEO ENCOUNTER        The patient has chosen to have the visit conducted as a video visit, to reduce risk of exposure given the current status of Coronavirus in our community. This video visit is being conducted via a call between the patient and physician/provider. Health care needs are being provided without a physical exam.        Assessment/Plan:   1.  Coronary artery disease s/p 3 V CABG LIMA to LAD, SVG to D1, SVG to RCA on 5-1-2019: The patient had no chest pain or shortness of breath.  Continue aspirin, Lipitor, metoprolol.     2.  Persistent atrial fibrillation: The patient's heart rate has been controlled very well.  He had no palpitations and shortness of breath.    Continue metoprolol 25 mg twice a day.     Continue anticoagulation due to [id number] score is 3.    His pulmonary CT reported left atrial appendage was ligated with residual 1.1 cm pouch and 5 mm sized communication between LA and JUDY.  Patient did not reach to the criteria of stopping anticoagulation post JUDY ligation.  At this time, continue Eliquis 5 mg twice a day.     3.  Essential hypertension: His blood pressure has been " well controlled with metoprolol 25 mg twice a day.  The target of his blood pressure less than 140/90 mmHg.     4.  Hyperlipidemia.  Continue Lipitor 80 mg nightly.  LDL was controlled, last LDL 55.    Follow Up Plan:  6 months  I have reviewed the note as documented.  This accurately captures the substance of my conversation with the patient.    Total time of video between patient and provider was 7 minutes   Start Time: 3:24 pm   Stop Time: 3:31 pm    Originating Location (pt. Location): video visit pt location:[id number] office    Distant Location (provider location):  [facility] HEART [facility]      Mode of Communication:  Video Conference via [email or web address]       History of Present Illness:   Giacomo Schneider Sr. is a 75 y.o. male who is being evaluated via a billable video visit and has consented to a video visit. Giacomo Schneider Sr. has a history of paroxysmal atrial fibrillation, hyperlipidemia, obstructive sleep apnea, recently diagnosed 2 V coronary artery disease s/p 3 V CABG LIMA to LAD, SVG to D1 and SVG to RCA and JUDY ligation on 5-1-2019 .      The patient states that he has done well since last visit.  He had no chest pain or chest discomfort. He had no palpitations, shortness of breath, dizziness, orthopnea, PND, or leg swelling. His blood pressure and heart rate have been controlled well.     He had no side effects from his current medications.    I have reviewed and updated the patient's Past Medical History, Social History, Family History and Medication List.    Past Medical History:     Patient Active Problem List   Diagnosis   ? Mixed hyperlipidemia   ? Atrial fibrillation (H)   ? Adult Sleep Apnea   ? Esophageal Reflux   ? Bilateral inguinal hernia   ? Essential hypertension   ? FONSECA (dyspnea on exertion)   ? Abnormal cardiovascular stress test   ? Abnormal computed tomography angiography (CTA)   ? Elevated coronary artery calcium score   ? S/P CABG (coronary artery bypass graft)   ? ARF (acute respiratory  failure) (H)   ? Anemia due to blood loss, acute   ? Coronary artery disease involving coronary bypass graft of native heart without angina pectoris       Past Surgical History:     Past Surgical History:   Procedure Laterality Date   ? CV CORONARY ANGIOGRAM N/A 4/22/2019    Procedure: Coronary Angiogram;  Surgeon: Qamar Martin MD;  Location: [facility] Cath Lab;  Service: Cardiology   ? LAPAROSCOPIC INGUINAL HERNIA REPAIR Bilateral 12/3/2015    Procedure: LAPAROSCOPIC BILATERAL INGUINAL HERNIA REPAIR WITH BILATERAL MESH;  Surgeon: Bran Puckett MD;  Location: [facility] Main OR;  Service:        Family History:     Family History   Problem Relation Age of Onset   ? Hypertension Father    ? No Medical Problems Mother    ? No Medical Problems Sister    ? No Medical Problems Brother    ? No Medical Problems Maternal Aunt    ? No Medical Problems Maternal Uncle    ? No Medical Problems Paternal Aunt    ? No Medical Problems Paternal Uncle    ? No Medical Problems Maternal Grandmother    ? No Medical Problems Maternal Grandfather    ? No Medical Problems Paternal Grandmother    ? No Medical Problems Paternal Grandfather    ? Anesthesia problems Neg Hx    ? Cancer Neg Hx    ? Clotting disorder Neg Hx    ? Diabetes Neg Hx    ? Dislocations Neg Hx    ? Osteoporosis Neg Hx    ? Psoriasis Neg Hx    ? Rashes / Skin problems Neg Hx    ? Severe sprains Neg Hx    ? Ulcerative colitis Neg Hx        Social History:    reports that he has been smoking cigars. He has quit using smokeless tobacco.  His smokeless tobacco use included chew. He reports current alcohol use. He reports that he does not use drugs.    Review of Systems:   12 systems are reviewed negative except for in HPI.    Meds:     Current Outpatient Medications:   ?  acetaminophen (TYLENOL) 325 MG tablet, Take 2 tablets (650 mg total) by mouth every 4 (four) hours as needed., Disp: , Rfl: 0  ?  ascorbic acid, vitamin C, (VITAMIN C) 1000 MG tablet, Take  1,000 mg by mouth daily., Disp: , Rfl:   ?  aspirin 81 MG EC tablet, Take 81 mg by mouth at bedtime., Disp: , Rfl:   ?  atorvastatin (LIPITOR) 80 MG tablet, TAKE 1 TABLET BY MOUTH ONCE DAILY AT BEDTIME, Disp: 90 tablet, Rfl: 0  ?  betamethasone, augmented, (DIPROLENE-AF) 0.05 % cream, Apply 1 application topically as needed.    , Disp: , Rfl:   ?  calcium carbonate-vitamin D3 (CALCIUM 600 + D,3,) 600 mg(1,500mg) -400 unit per tablet, Take 1 tablet by mouth daily., Disp: , Rfl:   ?  carboxymethylcellulose (REFRESH PLUS) 0.5 % Dpet ophthalmic dropperette, Administer 1 drop to both eyes as needed. , Disp: , Rfl:   ?  chlorthalidone (HYGROTEN) 25 MG tablet, Take 25 mg by mouth at bedtime., Disp: , Rfl:   ?  glucosamine-chondroitin (OSTEO BI-FLEX) 250-200 mg Tab, Take 2 tablets by mouth daily.    , Disp: , Rfl:   ?  hydrocortisone valerate (WEST-SAVANAH) 0.2 % ointment, Apply 1 application topically as needed (uses on hands and ankle).    , Disp: , Rfl:   ?  ketoconazole (NIZORAL) 2 % cream, Apply 1 application topically as needed. Apply a thin layer to affected area(s) twice daily as needed  For athletes foot   , Disp: , Rfl:   ?  krill-om-3-dha-epa-phospho-ast (MEGARED OMEGA-3 KRILL OIL) 291-840-28-64 mg cap, Take 1 capsule by mouth daily., Disp: , Rfl:   ?  metoprolol tartrate (LOPRESSOR) 50 MG tablet, Take 0.5 tablets (25 mg total) by mouth 2 (two) times a day., Disp: 180 tablet, Rfl: 1  ?  multivitamin therapeutic tablet, Take 1 tablet by mouth daily., Disp: , Rfl:   ?  omeprazole (PRILOSEC) 20 MG capsule, Take 20 mg by mouth 2 (two) times a day.    , Disp: , Rfl:   ?  s-adenosylmethionine (CECY-E) 400 mg Tab, Take 1 tablet by mouth daily., Disp: , Rfl:   ?  vitamin E 400 unit capsule, Take 400 Units by mouth daily., Disp: , Rfl:   ?  apixaban (ELIQUIS) 5 mg Tab tablet, Take 1 tablet (5 mg total) by mouth 2 (two) times a day., Disp: 180 tablet, Rfl: 1     Allergies:   Budesonide; Sulfa (sulfonamide antibiotics); and  Thimerosal      Objective:      Physical Exam  (!) 230 lb 3.2 oz (104.4 kg)     Body mass index is 31.22 kg/m .  /86   Pulse 75   Temp (!) 96.4  F (35.8  C)   Wt (!) 230 lb 3.2 oz (104.4 kg)   BMI 31.22 kg/m    General Appearance:   no distress, normal body habitus, upright.   ENT/Mouth: membranes moist, no nasal discharge or bleeding gums.  Normal head shape, no evidence of injury or laceration.     EYES:  no scleral icterus, normal conjunctivae   Neck: no evidence of thyromegaly.  Supple   Chest/Lungs:   No audible wheezing equal chest wall expansion. Non labored breathing.  No cough.   Cardiovascular:   No evidence of elevated jugular venous pressure.  No evidence of pitting edema bilaterally    Abdomen:  no evidence of abdominal distention. No observe juandice.     Extremities: no cyanosis or clubbing noted.    Skin: no xanthelasma, normal skin color. No evidence of facial lacerations.      Neurologic: Normal arm motion bilateral, no tremors.  No evidence of focal defect.       Psychiatric: alert and oriented x3, calm      EKG: Personally reviewed  Sinus rhythm with Fusion complexes   Right bundle branch block   Abnormal ECG   When compared with ECG of 30-APR-2019 08:56,   Sinus rhythm has replaced Atrial fibrillation   Right bundle branch block is now Present   QT has lengthened     Cardiac Imaging Studies  ECHO on 4-:    No previous study for comparison.    Left ventricle ejection fraction is normal. The estimated left ventricular ejection fraction is 55%.    Normal left ventricular size.    Normal right ventricular size and systolic function.    Mild mitral regurgitation     Coronary angiogram on 4-:  Angiography via left radial  LM normal  LAD mid 100% with collaterals via diagonal; diagonal with 90% lesion  Circ large mild dz  RCA 70% distal RCA segment     CT pulmonary vein with contrast to evaluate JUDY on 6-:  1.  Left atrial appendage was ligated, but left a pouch 1.1 cm in  depth and a 5 mm unsealed tunnel in inferior of left atrial appendage.   No evidence of JUDY thrombus formation.  2.  Moderately enlarged both atria.  3.  Normal left and right ventricular size and function.  4.  Diffuse coronary artery disease.  Mild disease in LM, occluded mid LAD and severe disease in D1.  Patent LIMA to distal LAD.  SVG to D1 is patent, but not able to see well after anastomosis, cannot exclude the occlusion.  Occluded mid RCA.  SVG to PDA is patent, PDA is a small vessel.    JUDY CT Scan on 10-:  1.  Left atrial appendage was ligated, but left a pouch 1.0 cm in depth and a 4.3 x 6 mm (0.22 cm  in area) unsealed tunnel in inferior side of left atrial appendage.   No evidence of JUDY thrombus formation.  2.  Moderately enlarged left atrium.  3.  Normal left and right ventricular size and function.  4.  Diffuse coronary artery disease.  Mild disease in LM, occluded mid LAD and severe disease in diagonal branches.  Patent LIMA to distal LAD, patent SVG to D. Mild disease in LCX.  Occluded mid RCA.  Patent SVG to PDA.   When compared to previous study on 6-, there is no significant interval change.    Lab Review   Lab Results   Component Value Date     (L) 05/05/2019    K 3.6 05/06/2019    CL 95 (L) 05/05/2019    CO2 25 05/05/2019    BUN 23 05/05/2019    CREATININE 1.04 05/05/2019    CALCIUM 9.5 05/05/2019     Lab Results   Component Value Date    WBC 9.6 05/02/2019    HGB 10.8 (L) 05/02/2019    HCT 33.4 (L) 05/02/2019    MCV 94 05/02/2019     05/05/2019     Lab Results   Component Value Date    CHOL 135 04/22/2019    TRIG 74 04/22/2019    HDL 42 04/22/2019

## 2021-06-30 NOTE — PROGRESS NOTES
Progress Notes by Solomon Vee MD at 1/20/2021  7:50 AM     Author: Solomon Vee MD Service: -- Author Type: Physician    Filed: 1/20/2021  8:25 AM Encounter Date: 1/20/2021 Status: Signed    : Solomon Vee MD (Physician)           Click to link to [facility] Heart [facility] HEART [facility] NOTE       Assessment/Plan:   1.  Coronary artery disease s/p 3 V CABG LIMA to LAD, SVG to D1, SVG to RCA on 5-1-2019: The patient had no chest pain or shortness of breath.  Continue aspirin, Lipitor, metoprolol.     2.  Persistent atrial fibrillation: The patient's heart rate has been controlled very well.  He had no palpitations and shortness of breath.    Continue metoprolol 25 mg twice a day.     His [id number] score is 3.  He discontinued Eliquis by himself about a year time since he wants to do softball game twice a week.   His pulmonary CT reported left atrial appendage was ligated with residual 1.1 cm pouch and 5 mm sized communication between LA and JUDY.  Patient did not reach to the criteria of stopping anticoagulation post JUDY ligation.    Repeat pulmonary CT scan to reevaluate left atrial appendage ligation to see if there residual communication is closed.  This study is important for risk stratification of stroke.  The patient agreed with the plan.     3.  Essential hypertension: His blood pressure has been well controlled with metoprolol 25 mg twice a day.  The target of his blood pressure less than 140/90 mmHg.     4.  Hyperlipidemia.  Continue Lipitor 80 mg nightly.  LDL was controlled, last LDL 55.       Thank you for the opportunity to be involved in the care of Giacomo Schneider . If you have any questions, please feel free to contact me.  I will see the patient again in 6 months.    Much or all of the text in this note was generated through the use of Dragon Dictate voice-to-text software. Errors in spelling or words which seem out of context are unintentional.   Sound alike errors, in particular,  may have escaped editing.       History of Present Illness:   It is my pleasure to see Giacomo Schneider Sr. at the [facility] clinic for evaluation of Follow-up.  Giacomo Schneider Sr. is a 75 y.o. male with a medical history of  persistent atrial fibrillation, hyperlipidemia, obstructive sleep apnea, recently diagnosed 2 V coronary artery disease s/p 3 V CABG LIMA to LAD, SVG to D1 and SVG to RCA and JUDY ligation on 5-1-2019 .      The patient states that he has done same since last visit.  He had no chest pain or chest discomfort.  He complains of chronic shortness of breath on exertion which has been stable.  He had no palpitations, shortness of breath, dizziness, orthopnea, PND, or leg swelling. His blood pressure and heart rate have been controlled well.     He had no side effects from his current medications.    Past Medical History:     Patient Active Problem List   Diagnosis   ? Mixed hyperlipidemia   ? Atrial fibrillation (H)   ? Adult Sleep Apnea   ? Esophageal Reflux   ? Bilateral inguinal hernia   ? Essential hypertension   ? FONSECA (dyspnea on exertion)   ? Abnormal cardiovascular stress test   ? Abnormal computed tomography angiography (CTA)   ? Elevated coronary artery calcium score   ? S/P CABG (coronary artery bypass graft)   ? ARF (acute respiratory failure) (H)   ? Anemia due to blood loss, acute   ? Coronary artery disease involving coronary bypass graft of native heart without angina pectoris       Past Surgical History:     Past Surgical History:   Procedure Laterality Date   ? CV CORONARY ANGIOGRAM N/A 4/22/2019    Procedure: Coronary Angiogram;  Surgeon: Qamar Martin MD;  Location: [facility] Cath Lab;  Service: Cardiology   ? LAPAROSCOPIC INGUINAL HERNIA REPAIR Bilateral 12/3/2015    Procedure: LAPAROSCOPIC BILATERAL INGUINAL HERNIA REPAIR WITH BILATERAL MESH;  Surgeon: Bran Puckett MD;  Location: [facility] OR;  Service:        Family History:     Family History    Problem Relation Age of Onset   ? Hypertension Father    ? No Medical Problems Mother    ? No Medical Problems Sister    ? No Medical Problems Brother    ? No Medical Problems Maternal Aunt    ? No Medical Problems Maternal Uncle    ? No Medical Problems Paternal Aunt    ? No Medical Problems Paternal Uncle    ? No Medical Problems Maternal Grandmother    ? No Medical Problems Maternal Grandfather    ? No Medical Problems Paternal Grandmother    ? No Medical Problems Paternal Grandfather    ? Anesthesia problems Neg Hx    ? Cancer Neg Hx    ? Clotting disorder Neg Hx    ? Diabetes Neg Hx    ? Dislocations Neg Hx    ? Osteoporosis Neg Hx    ? Psoriasis Neg Hx    ? Rashes / Skin problems Neg Hx    ? Severe sprains Neg Hx    ? Ulcerative colitis Neg Hx         Social History:    reports that he has been smoking cigars. He has quit using smokeless tobacco.  His smokeless tobacco use included chew. He reports current alcohol use. He reports that he does not use drugs.    Review of Systems:   General: WNL  Eyes: WNL  Ears/Nose/Throat: Hearing Loss  Lungs: Snoring  Heart: Shortness of Breath with activity  Stomach: WNL  Bladder: Frequent Urination at Night  Muscle/Joints: WNL  Skin: Rash  Nervous System: WNL  Mental Health: Anxiety     Blood: WNL    Meds:     Current Outpatient Medications:   ?  acetaminophen (TYLENOL) 325 MG tablet, Take 2 tablets (650 mg total) by mouth every 4 (four) hours as needed., Disp: , Rfl: 0  ?  ascorbic acid, vitamin C, (VITAMIN C) 1000 MG tablet, Take 1,000 mg by mouth daily., Disp: , Rfl:   ?  aspirin 81 MG EC tablet, Take 81 mg by mouth at bedtime., Disp: , Rfl:   ?  atorvastatin (LIPITOR) 80 MG tablet, TAKE 1 TABLET BY MOUTH ONCE DAILY AT BEDTIME, Disp: 90 tablet, Rfl: 2  ?  betamethasone, augmented, (DIPROLENE-AF) 0.05 % cream, Apply 1 application topically as needed.    , Disp: , Rfl:   ?  calcium carbonate-vitamin D3 (CALCIUM 600 + D,3,) 600 mg(1,500mg) -400 unit per tablet, Take 1  tablet by mouth daily., Disp: , Rfl:   ?  carboxymethylcellulose (REFRESH PLUS) 0.5 % Dpet ophthalmic dropperette, Administer 1 drop to both eyes as needed. , Disp: , Rfl:   ?  chlorthalidone (HYGROTEN) 25 MG tablet, Take 25 mg by mouth at bedtime., Disp: , Rfl:   ?  glucosamine-chondroitin (OSTEO BI-FLEX) 250-200 mg Tab, Take 2 tablets by mouth daily.    , Disp: , Rfl:   ?  hydrocortisone valerate (WEST-SAVANAH) 0.2 % ointment, Apply 1 application topically as needed (uses on hands and ankle).    , Disp: , Rfl:   ?  ketoconazole (NIZORAL) 2 % cream, Apply 1 application topically as needed. Apply a thin layer to affected area(s) twice daily as needed  For athletes foot   , Disp: , Rfl:   ?  krill-om-3-dha-epa-phospho-ast (MEGARED OMEGA-3 KRILL OIL) 153-568-78-64 mg cap, Take 1 capsule by mouth daily., Disp: , Rfl:   ?  metoprolol tartrate (LOPRESSOR) 50 MG tablet, Take 0.5 tablets (25 mg total) by mouth 2 (two) times a day., Disp: 90 tablet, Rfl: 3  ?  multivitamin therapeutic tablet, Take 1 tablet by mouth daily., Disp: , Rfl:   ?  omeprazole (PRILOSEC) 20 MG capsule, Take 20 mg by mouth 2 (two) times a day.    , Disp: , Rfl:   ?  s-adenosylmethionine (CECY-E) 400 mg Tab, Take 1 tablet by mouth daily., Disp: , Rfl:   ?  vitamin E 400 unit capsule, Take 400 Units by mouth daily., Disp: , Rfl:   ?  apixaban (ELIQUIS) 5 mg Tab tablet, Take 1 tablet (5 mg total) by mouth 2 (two) times a day., Disp: 180 tablet, Rfl: 1    Allergies:   Budesonide, Sulfa (sulfonamide antibiotics), and Thimerosal      Objective:      Physical Exam  (!) 232 lb (105.2 kg)  6' (1.829 m)  Body mass index is 31.46 kg/m .  /80 (Patient Site: Left Arm, Patient Position: Sitting, Cuff Size: Adult Large)   Pulse 68   Resp 14   Ht 6' (1.829 m)   Wt (!) 232 lb (105.2 kg)   BMI 31.46 kg/m      General Appearance:   Awake, Alert, No acute distress.   HEENT:  Pupil equal and reactive to light. No scleral icterus; the mucous membranes were moist.    Neck: No cervical bruits. No JVD. No thyromegaly.     Chest: The spine was straight. The chest was symmetric.   Lungs:   Respirations unlabored; Lungs are clear to auscultation. No crackles. No wheezing.   Cardiovascular:   Irrgular rhythm and rate, normal first and second heart sounds with no murmurs. No rubs or gallops.    Abdomen:  Soft. No tenderness. Non-distended. Bowels sounds are present   Extremities: Equal tibial pulses. No leg edema.   Skin: No rashes or ulcers. Warm, Dry.   Musculoskeletal: No tenderness. No deformity.   Neurologic: Mood and affect are appropriate. No focal deficits.         EKG: Personally reviewed  Sinus rhythm with Fusion complexes   Right bundle branch block   Abnormal ECG   When compared with ECG of 30-APR-2019 08:56,   Sinus rhythm has replaced Atrial fibrillation   Right bundle branch block is now Present   QT has lengthened     Cardiac Imaging Studies  ECHO on 4-:    No previous study for comparison.    Left ventricle ejection fraction is normal. The estimated left ventricular ejection fraction is 55%.    Normal left ventricular size.    Normal right ventricular size and systolic function.    Mild mitral regurgitation     Coronary angiogram on 4-:  Angiography via left radial  LM normal  LAD mid 100% with collaterals via diagonal; diagonal with 90% lesion  Circ large mild dz  RCA 70% distal RCA segment     CT pulmonary vein with contrast to evaluate JUDY on 6-:  1.  Left atrial appendage was ligated, but left a pouch 1.1 cm in depth and a 5 mm unsealed tunnel in inferior of left atrial appendage.   No evidence of JUDY thrombus formation.  2.  Moderately enlarged both atria.  3.  Normal left and right ventricular size and function.  4.  Diffuse coronary artery disease.  Mild disease in LM, occluded mid LAD and severe disease in D1.  Patent LIMA to distal LAD.  SVG to D1 is patent, but not able to see well after anastomosis, cannot exclude the occlusion.   Occluded mid RCA.  SVG to PDA is patent, PDA is a small vessel.     JUDY CT Scan on 10-:  1.  Left atrial appendage was ligated, but left a pouch 1.0 cm in depth and a 4.3 x 6 mm (0.22 cm  in area) unsealed tunnel in inferior side of left atrial appendage.   No evidence of JUDY thrombus formation.  2.  Moderately enlarged left atrium.  3.  Normal left and right ventricular size and function.  4.  Diffuse coronary artery disease.  Mild disease in LM, occluded mid LAD and severe disease in diagonal branches.  Patent LIMA to distal LAD, patent SVG to D. Mild disease in LCX.  Occluded mid RCA.  Patent SVG to PDA.   When compared to previous study on 6-, there is no significant interval change.    Lab Review   Lab Results   Component Value Date     (L) 05/05/2019    K 3.6 05/06/2019    CL 95 (L) 05/05/2019    CO2 25 05/05/2019    BUN 23 05/05/2019    CREATININE 1.04 05/05/2019    CALCIUM 9.5 05/05/2019     Lab Results   Component Value Date    WBC 9.6 05/02/2019    HGB 10.8 (L) 05/02/2019    HCT 33.4 (L) 05/02/2019    MCV 94 05/02/2019     05/05/2019     Lab Results   Component Value Date    CHOL 135 04/22/2019     Lab Results   Component Value Date    HDL 42 04/22/2019     Lab Results   Component Value Date    LDLCALC 78 04/22/2019     Lab Results   Component Value Date    TRIG 74 04/22/2019

## 2021-07-12 ENCOUNTER — OFFICE VISIT (OUTPATIENT)
Dept: CARDIOLOGY | Facility: CLINIC | Age: 76
End: 2021-07-12
Payer: COMMERCIAL

## 2021-07-12 VITALS
BODY MASS INDEX: 31.89 KG/M2 | SYSTOLIC BLOOD PRESSURE: 110 MMHG | RESPIRATION RATE: 20 BRPM | DIASTOLIC BLOOD PRESSURE: 70 MMHG | HEART RATE: 96 BPM | WEIGHT: 235.4 LBS | HEIGHT: 72 IN

## 2021-07-12 DIAGNOSIS — I10 ESSENTIAL HYPERTENSION: ICD-10-CM

## 2021-07-12 DIAGNOSIS — I48.11 LONGSTANDING PERSISTENT ATRIAL FIBRILLATION (H): ICD-10-CM

## 2021-07-12 DIAGNOSIS — E78.2 MIXED HYPERLIPIDEMIA: ICD-10-CM

## 2021-07-12 DIAGNOSIS — I25.810 CORONARY ARTERY DISEASE INVOLVING CORONARY BYPASS GRAFT OF NATIVE HEART WITHOUT ANGINA PECTORIS: Primary | ICD-10-CM

## 2021-07-12 PROCEDURE — 99214 OFFICE O/P EST MOD 30 MIN: CPT | Performed by: INTERNAL MEDICINE

## 2021-07-12 RX ORDER — VITAMIN E 200 UNIT
1 CAPSULE ORAL
COMMUNITY
End: 2024-03-28

## 2021-07-12 RX ORDER — MULTIVITAMIN,THERAPEUTIC
1 TABLET ORAL
COMMUNITY

## 2021-07-12 RX ORDER — VITAMIN E 268 MG
400 CAPSULE ORAL DAILY
COMMUNITY
End: 2023-04-27

## 2021-07-12 RX ORDER — KETOCONAZOLE 20 MG/G
CREAM TOPICAL
COMMUNITY
Start: 2021-04-29 | End: 2023-04-27

## 2021-07-12 RX ORDER — CARBOXYMETHYLCELLULOSE SODIUM 5 MG/ML
1 SOLUTION/ DROPS OPHTHALMIC
COMMUNITY
End: 2024-03-28

## 2021-07-12 RX ORDER — BETAMETHASONE DIPROPIONATE 0.5 MG/G
CREAM TOPICAL
COMMUNITY
Start: 2020-12-14 | End: 2023-04-27

## 2021-07-12 RX ORDER — TERBINAFINE HYDROCHLORIDE 250 MG/1
TABLET ORAL
COMMUNITY
Start: 2021-02-04 | End: 2021-11-18

## 2021-07-12 ASSESSMENT — MIFFLIN-ST. JEOR: SCORE: 1825.77

## 2021-07-12 NOTE — PROGRESS NOTES
Progress Notes by Solomon Vee MD at 1/20/2021  7:50 AM     Author: Solomon Vee MD Service: -- Author Type: Physician    Filed: 1/20/2021  8:25 AM Encounter Date: 1/20/2021 Status: Signed    : Solomon Vee MD (Physician)           Click to link to [facility] HEART [facility] NOTE       Assessment/Plan:   1.  Coronary artery disease s/p 3 V CABG LIMA to LAD, SVG to D1, SVG to RCA on 5-1-2019: The patient had no chest pain or shortness of breath.  Continue aspirin, Lipitor, metoprolol.     2.  Persistent atrial fibrillation: The patient's heart rate has been controlled very well.  He had no palpitations and shortness of breath.    Continue metoprolol 25 mg twice a day.     His [id number] score is 3.  He discontinued Eliquis by himself and refused to take chronic anticoagulation since he has played softball.   His pulmonary CT reported left atrial appendage was ligated with residual around 5 mm sized communication between LA and JUDY.  He refused to have any further evaluation of left atrium appendage.  The patient has been doing quite well.  Continue to monitor him.  Currently he is on aspirin 81 mg daily.     3.  Essential hypertension: His blood pressure has been well controlled with metoprolol 25 mg twice a day.  The target of his blood pressure less than 140/90 mmHg.     4.  Hyperlipidemia.  Continue Lipitor 80 mg nightly.     The patient states that he will call Dr. Ponce tomorrow to schedule annual physical exam and will have routine labs including Lipid profile.     Thank you for the opportunity to be involved in the care of Giacomo Schneider . If you have any questions, please feel free to contact me.  I will see the patient again in 6 months.    Much or all of the text in this note was generated through the use of Dragon Dictate voice-to-text software. Errors in spelling or words which seem out of context are unintentional.   Sound alike errors, in particular, may have escaped  editing.       History of Present Illness:   It is my pleasure to see Giacomo Schneider Sr. at the [facility] clinic for routine cardiology follow-up.  Giacomo Schneider Sr. is a 76 y.o. male with a medical history of  persistent atrial fibrillation, hyperlipidemia, obstructive sleep apnea, recently diagnosed 2 V coronary artery disease s/p 3 V CABG LIMA to LAD, SVG to D1 and SVG to RCA and JUDY ligation on 5-1-2019 .      The patient states that he has done well over last 6 months.  He had no chest pain or chest discomfort.  He complains of chronic shortness of breath on exertion which has been stable.  He had no palpitations, shortness of breath, dizziness, orthopnea, PND, or leg swelling. His blood pressure and heart rate have been controlled well.     He had no side effects from his current medications.    Past Medical History:     Patient Active Problem List   Diagnosis     Mixed hyperlipidemia     Atrial fibrillation (H)     Adult Sleep Apnea     Esophageal Reflux     Bilateral inguinal hernia     Essential hypertension     FONSECA (dyspnea on exertion)     Abnormal cardiovascular stress test     Abnormal computed tomography angiography (CTA)     Elevated coronary artery calcium score     S/P CABG (coronary artery bypass graft)     ARF (acute respiratory failure) (H)     Anemia due to blood loss, acute     Coronary artery disease involving coronary bypass graft of native heart without angina pectoris       Past Surgical History:     Past Surgical History:   Procedure Laterality Date     CV CORONARY ANGIOGRAM N/A 4/22/2019    Procedure: Coronary Angiogram;  Surgeon: Qamar Martin MD;  Location: [facility] Cath Lab;  Service: Cardiology     LAPAROSCOPIC INGUINAL HERNIA REPAIR Bilateral 12/3/2015    Procedure: LAPAROSCOPIC BILATERAL INGUINAL HERNIA REPAIR WITH BILATERAL MESH;  Surgeon: Bran Puckett MD;  Location: [facility] OR;  Service:        Family History:     Family History   Problem  Relation Age of Onset     Hypertension Father      No Medical Problems Mother      No Medical Problems Sister      No Medical Problems Brother      No Medical Problems Maternal Aunt      No Medical Problems Maternal Uncle      No Medical Problems Paternal Aunt      No Medical Problems Paternal Uncle      No Medical Problems Maternal Grandmother      No Medical Problems Maternal Grandfather      No Medical Problems Paternal Grandmother      No Medical Problems Paternal Grandfather      Anesthesia problems Neg Hx      Cancer Neg Hx      Clotting disorder Neg Hx      Diabetes Neg Hx      Dislocations Neg Hx      Osteoporosis Neg Hx      Psoriasis Neg Hx      Rashes / Skin problems Neg Hx      Severe sprains Neg Hx      Ulcerative colitis Neg Hx         Social History:    reports that he has been smoking cigars. He has quit using smokeless tobacco.  His smokeless tobacco use included chew. He reports current alcohol use. He reports that he does not use drugs.    Review of Systems:   General: WNL  Eyes: WNL  Ears/Nose/Throat: Hearing Loss  Lungs: Snoring  Heart: Shortness of Breath with activity  Stomach: WNL  Bladder: Frequent Urination at Night  Muscle/Joints: WNL  Skin: Rash  Nervous System: WNL  Mental Health: Anxiety     Blood: WNL    Meds:     Current Outpatient Medications:      acetaminophen (TYLENOL) 325 MG tablet, Take 2 tablets (650 mg total) by mouth every 4 (four) hours as needed., Disp: , Rfl: 0     ascorbic acid, vitamin C, (VITAMIN C) 1000 MG tablet, Take 1,000 mg by mouth daily., Disp: , Rfl:      aspirin 81 MG EC tablet, Take 81 mg by mouth at bedtime., Disp: , Rfl:      atorvastatin (LIPITOR) 80 MG tablet, TAKE 1 TABLET BY MOUTH ONCE DAILY AT BEDTIME, Disp: 90 tablet, Rfl: 2     betamethasone, augmented, (DIPROLENE-AF) 0.05 % cream, Apply 1 application topically as needed.    , Disp: , Rfl:      calcium carbonate-vitamin D3 (CALCIUM 600 + D,3,) 600 mg(1,500mg) -400 unit per tablet, Take 1 tablet by  mouth daily., Disp: , Rfl:      carboxymethylcellulose (REFRESH PLUS) 0.5 % Dpet ophthalmic dropperette, Administer 1 drop to both eyes as needed. , Disp: , Rfl:      chlorthalidone (HYGROTEN) 25 MG tablet, Take 25 mg by mouth at bedtime., Disp: , Rfl:      glucosamine-chondroitin (OSTEO BI-FLEX) 250-200 mg Tab, Take 2 tablets by mouth daily.    , Disp: , Rfl:      hydrocortisone valerate (WEST-SAVANAH) 0.2 % ointment, Apply 1 application topically as needed (uses on hands and ankle).    , Disp: , Rfl:      ketoconazole (NIZORAL) 2 % cream, Apply 1 application topically as needed. Apply a thin layer to affected area(s) twice daily as needed  For athletes foot   , Disp: , Rfl:      krill-om-3-dha-epa-phospho-ast (MEGARED OMEGA-3 KRILL OIL) 346-444-86-64 mg cap, Take 1 capsule by mouth daily., Disp: , Rfl:      metoprolol tartrate (LOPRESSOR) 50 MG tablet, Take 0.5 tablets (25 mg total) by mouth 2 (two) times a day., Disp: 90 tablet, Rfl: 3     multivitamin therapeutic tablet, Take 1 tablet by mouth daily., Disp: , Rfl:      omeprazole (PRILOSEC) 20 MG capsule, Take 20 mg by mouth 2 (two) times a day.    , Disp: , Rfl:      s-adenosylmethionine (CECY-E) 400 mg Tab, Take 1 tablet by mouth daily., Disp: , Rfl:      vitamin E 400 unit capsule, Take 400 Units by mouth daily., Disp: , Rfl:       Allergies:   Budesonide, Sulfa (sulfonamide antibiotics), and Thimerosal      Objective:      Physical Exam  (!) 235 lb (105.2 kg)  6' (1.829 m)  Body mass index is 31.46 kg/m .  /70 (Patient Site: Left Arm, Patient Position: Sitting, Cuff Size: Adult Large)   Pulse 96   Resp 20   Ht 6' (1.829 m)   Wt (!) 235 lb   BMI 31.46 kg/m      General Appearance:   Awake, Alert, No acute distress.   HEENT:  Pupil equal and reactive to light. No scleral icterus; the mucous membranes were moist.   Neck: No cervical bruits. No JVD. No thyromegaly.     Chest: The spine was straight. The chest was symmetric.   Lungs:   Respirations  unlabored; Lungs are clear to auscultation. No crackles. No wheezing.   Cardiovascular:   Irrgular rhythm and rate, normal first and second heart sounds with no murmurs. No rubs or gallops.    Abdomen:  Soft. No tenderness. Non-distended. Bowels sounds are present   Extremities: Equal tibial pulses. No leg edema.   Skin: No rashes or ulcers. Warm, Dry.   Musculoskeletal: No tenderness. No deformity.   Neurologic: Mood and affect are appropriate. No focal deficits.         EKG: Personally reviewed  Sinus rhythm with Fusion complexes   Right bundle branch block   Abnormal ECG   When compared with ECG of 30-APR-2019 08:56,   Sinus rhythm has replaced Atrial fibrillation   Right bundle branch block is now Present   QT has lengthened     Cardiac Imaging Studies  ECHO on 4-:    No previous study for comparison.    Left ventricle ejection fraction is normal. The estimated left ventricular ejection fraction is 55%.    Normal left ventricular size.    Normal right ventricular size and systolic function.    Mild mitral regurgitation     Coronary angiogram on 4-:  Angiography via left radial  LM normal  LAD mid 100% with collaterals via diagonal; diagonal with 90% lesion  Circ large mild dz  RCA 70% distal RCA segment     CT pulmonary vein with contrast to evaluate JUDY on 6-:  1.  Left atrial appendage was ligated, but left a pouch 1.1 cm in depth and a 5 mm unsealed tunnel in inferior of left atrial appendage.   No evidence of JUDY thrombus formation.  2.  Moderately enlarged both atria.  3.  Normal left and right ventricular size and function.  4.  Diffuse coronary artery disease.  Mild disease in LM, occluded mid LAD and severe disease in D1.  Patent LIMA to distal LAD.  SVG to D1 is patent, but not able to see well after anastomosis, cannot exclude the occlusion.  Occluded mid RCA.  SVG to PDA is patent, PDA is a small vessel.     JUDY CT Scan on 10-:  1.  Left atrial appendage was ligated, but  left a pouch 1.0 cm in depth and a 4.3 x 6 mm (0.22 cm  in area) unsealed tunnel in inferior side of left atrial appendage.   No evidence of JUDY thrombus formation.  2.  Moderately enlarged left atrium.  3.  Normal left and right ventricular size and function.  4.  Diffuse coronary artery disease.  Mild disease in LM, occluded mid LAD and severe disease in diagonal branches.  Patent LIMA to distal LAD, patent SVG to D. Mild disease in LCX.  Occluded mid RCA.  Patent SVG to PDA.   When compared to previous study on 6-, there is no significant interval change.    CT pulmonary vein study on 2-:  Reviewed images and findings are similar to previous study.    Lab Review   Lab Results   Component Value Date     (L) 05/05/2019    K 3.6 05/06/2019    CL 95 (L) 05/05/2019    CO2 25 05/05/2019    BUN 23 05/05/2019    CREATININE 1.04 05/05/2019    CALCIUM 9.5 05/05/2019     Lab Results   Component Value Date    WBC 9.6 05/02/2019    HGB 10.8 (L) 05/02/2019    HCT 33.4 (L) 05/02/2019    MCV 94 05/02/2019     05/05/2019     Lab Results   Component Value Date    CHOL 135 04/22/2019     Lab Results   Component Value Date    HDL 42 04/22/2019     Lab Results   Component Value Date    LDLCALC 78 04/22/2019     Lab Results   Component Value Date    TRIG 74 04/22/2019

## 2021-07-12 NOTE — LETTER
7/12/2021    Giovani Ponce MD  [address] [place] [id number]    RE: Giacomo PARIKH Jennie Thakur       Dear Colleague,    I had the pleasure of seeing Giacomo PARIKH Jennie Thakur in the [facility].    Progress Notes by Solomon Vee MD at 1/20/2021  7:50 AM     Author: Solomon Vee MD Service: -- Author Type: Physician    Filed: 1/20/2021  8:25 AM Encounter Date: 1/20/2021 Status: Signed    : Solomon Vee MD (Physician)           Click to link to [facility] HEART [facility] NOTE       Assessment/Plan:   1.  Coronary artery disease s/p 3 V CABG LIMA to LAD, SVG to D1, SVG to RCA on 5-1-2019: The patient had no chest pain or shortness of breath.  Continue aspirin, Lipitor, metoprolol.     2.  Persistent atrial fibrillation: The patient's heart rate has been controlled very well.  He had no palpitations and shortness of breath.    Continue metoprolol 25 mg twice a day.     His [id number] score is 3.  He discontinued Eliquis by himself and refused to take chronic anticoagulation since he has played softball.   His pulmonary CT reported left atrial appendage was ligated with residual around 5 mm sized communication between LA and JUDY.  He refused to have any further evaluation of left atrium appendage.  The patient has been doing quite well.  Continue to monitor him.  Currently he is on aspirin 81 mg daily.     3.  Essential hypertension: His blood pressure has been well controlled with metoprolol 25 mg twice a day.  The target of his blood pressure less than 140/90 mmHg.     4.  Hyperlipidemia.  Continue Lipitor 80 mg nightly.     The patient states that he will call Dr. Ponce tomorrow to schedule annual physical exam and will have routine labs including Lipid profile.     Thank you for the opportunity to be involved in the care of Giacomo PARIKH Jennie Thkaur. If you have any questions, please feel free to contact me.  I will see the patient again  in 6 months.    Much or all of the text in this note was generated through the use of Dragon Dictate voice-to-text software. Errors in spelling or words which seem out of context are unintentional.   Sound alike errors, in particular, may have escaped editing.       History of Present Illness:   It is my pleasure to see Giacomo Schneider Sr. at the [facility] clinic for routine cardiology follow-up.  Giacomo Schneider Sr. is a 76 y.o. male with a medical history of  persistent atrial fibrillation, hyperlipidemia, obstructive sleep apnea, recently diagnosed 2 V coronary artery disease s/p 3 V CABG LIMA to LAD, SVG to D1 and SVG to RCA and JUDY ligation on 5-1-2019 .      The patient states that he has done well over last 6 months.  He had no chest pain or chest discomfort.  He complains of chronic shortness of breath on exertion which has been stable.  He had no palpitations, shortness of breath, dizziness, orthopnea, PND, or leg swelling. His blood pressure and heart rate have been controlled well.     He had no side effects from his current medications.    Past Medical History:     Patient Active Problem List   Diagnosis     Mixed hyperlipidemia     Atrial fibrillation (H)     Adult Sleep Apnea     Esophageal Reflux     Bilateral inguinal hernia     Essential hypertension     FONSECA (dyspnea on exertion)     Abnormal cardiovascular stress test     Abnormal computed tomography angiography (CTA)     Elevated coronary artery calcium score     S/P CABG (coronary artery bypass graft)     ARF (acute respiratory failure) (H)     Anemia due to blood loss, acute     Coronary artery disease involving coronary bypass graft of native heart without angina pectoris       Past Surgical History:     Past Surgical History:   Procedure Laterality Date     CV CORONARY ANGIOGRAM N/A 4/22/2019    Procedure: Coronary Angiogram;  Surgeon: Qamar Martin MD;  Location: [facility] Cath Lab;  Service: Cardiology      LAPAROSCOPIC INGUINAL HERNIA REPAIR Bilateral 12/3/2015    Procedure: LAPAROSCOPIC BILATERAL INGUINAL HERNIA REPAIR WITH BILATERAL MESH;  Surgeon: Bran Puckett MD;  Location: [facility];  Service:        Family History:     Family History   Problem Relation Age of Onset     Hypertension Father      No Medical Problems Mother      No Medical Problems Sister      No Medical Problems Brother      No Medical Problems Maternal Aunt      No Medical Problems Maternal Uncle      No Medical Problems Paternal Aunt      No Medical Problems Paternal Uncle      No Medical Problems Maternal Grandmother      No Medical Problems Maternal Grandfather      No Medical Problems Paternal Grandmother      No Medical Problems Paternal Grandfather      Anesthesia problems Neg Hx      Cancer Neg Hx      Clotting disorder Neg Hx      Diabetes Neg Hx      Dislocations Neg Hx      Osteoporosis Neg Hx      Psoriasis Neg Hx      Rashes / Skin problems Neg Hx      Severe sprains Neg Hx      Ulcerative colitis Neg Hx         Social History:    reports that he has been smoking cigars. He has quit using smokeless tobacco.  His smokeless tobacco use included chew. He reports current alcohol use. He reports that he does not use drugs.    Review of Systems:   General: WNL  Eyes: WNL  Ears/Nose/Throat: Hearing Loss  Lungs: Snoring  Heart: Shortness of Breath with activity  Stomach: WNL  Bladder: Frequent Urination at Night  Muscle/Joints: WNL  Skin: Rash  Nervous System: WNL  Mental Health: Anxiety     Blood: WNL    Meds:     Current Outpatient Medications:      acetaminophen (TYLENOL) 325 MG tablet, Take 2 tablets (650 mg total) by mouth every 4 (four) hours as needed., Disp: , Rfl: 0     ascorbic acid, vitamin C, (VITAMIN C) 1000 MG tablet, Take 1,000 mg by mouth daily., Disp: , Rfl:      aspirin 81 MG EC tablet, Take 81 mg by mouth at bedtime., Disp: , Rfl:      atorvastatin (LIPITOR) 80 MG tablet, TAKE 1 TABLET BY MOUTH ONCE DAILY AT  BEDTIME, Disp: 90 tablet, Rfl: 2     betamethasone, augmented, (DIPROLENE-AF) 0.05 % cream, Apply 1 application topically as needed.    , Disp: , Rfl:      calcium carbonate-vitamin D3 (CALCIUM 600 + D,3,) 600 mg(1,500mg) -400 unit per tablet, Take 1 tablet by mouth daily., Disp: , Rfl:      carboxymethylcellulose (REFRESH PLUS) 0.5 % Dpet ophthalmic dropperette, Administer 1 drop to both eyes as needed. , Disp: , Rfl:      chlorthalidone (HYGROTEN) 25 MG tablet, Take 25 mg by mouth at bedtime., Disp: , Rfl:      glucosamine-chondroitin (OSTEO BI-FLEX) 250-200 mg Tab, Take 2 tablets by mouth daily.    , Disp: , Rfl:      hydrocortisone valerate (WEST-SAVANAH) 0.2 % ointment, Apply 1 application topically as needed (uses on hands and ankle).    , Disp: , Rfl:      ketoconazole (NIZORAL) 2 % cream, Apply 1 application topically as needed. Apply a thin layer to affected area(s) twice daily as needed  For athletes foot   , Disp: , Rfl:      krill-om-3-dha-epa-phospho-ast (MEGARED OMEGA-3 KRILL OIL) 115-464-41-64 mg cap, Take 1 capsule by mouth daily., Disp: , Rfl:      metoprolol tartrate (LOPRESSOR) 50 MG tablet, Take 0.5 tablets (25 mg total) by mouth 2 (two) times a day., Disp: 90 tablet, Rfl: 3     multivitamin therapeutic tablet, Take 1 tablet by mouth daily., Disp: , Rfl:      omeprazole (PRILOSEC) 20 MG capsule, Take 20 mg by mouth 2 (two) times a day.    , Disp: , Rfl:      s-adenosylmethionine (CECY-E) 400 mg Tab, Take 1 tablet by mouth daily., Disp: , Rfl:      vitamin E 400 unit capsule, Take 400 Units by mouth daily., Disp: , Rfl:       Allergies:   Budesonide, Sulfa (sulfonamide antibiotics), and Thimerosal      Objective:      Physical Exam  (!) 235 lb (105.2 kg)  6' (1.829 m)  Body mass index is 31.46 kg/m .  /70 (Patient Site: Left Arm, Patient Position: Sitting, Cuff Size: Adult Large)   Pulse 96   Resp 20   Ht 6' (1.829 m)   Wt (!) 235 lb   BMI 31.46 kg/m      General Appearance:   Awake, Alert,  No acute distress.   HEENT:  Pupil equal and reactive to light. No scleral icterus; the mucous membranes were moist.   Neck: No cervical bruits. No JVD. No thyromegaly.     Chest: The spine was straight. The chest was symmetric.   Lungs:   Respirations unlabored; Lungs are clear to auscultation. No crackles. No wheezing.   Cardiovascular:   Irrgular rhythm and rate, normal first and second heart sounds with no murmurs. No rubs or gallops.    Abdomen:  Soft. No tenderness. Non-distended. Bowels sounds are present   Extremities: Equal tibial pulses. No leg edema.   Skin: No rashes or ulcers. Warm, Dry.   Musculoskeletal: No tenderness. No deformity.   Neurologic: Mood and affect are appropriate. No focal deficits.         EKG: Personally reviewed  Sinus rhythm with Fusion complexes   Right bundle branch block   Abnormal ECG   When compared with ECG of 30-APR-2019 08:56,   Sinus rhythm has replaced Atrial fibrillation   Right bundle branch block is now Present   QT has lengthened     Cardiac Imaging Studies  ECHO on 4-:    No previous study for comparison.    Left ventricle ejection fraction is normal. The estimated left ventricular ejection fraction is 55%.    Normal left ventricular size.    Normal right ventricular size and systolic function.    Mild mitral regurgitation     Coronary angiogram on 4-:  Angiography via left radial  LM normal  LAD mid 100% with collaterals via diagonal; diagonal with 90% lesion  Circ large mild dz  RCA 70% distal RCA segment     CT pulmonary vein with contrast to evaluate JUDY on 6-:  1.  Left atrial appendage was ligated, but left a pouch 1.1 cm in depth and a 5 mm unsealed tunnel in inferior of left atrial appendage.   No evidence of JUDY thrombus formation.  2.  Moderately enlarged both atria.  3.  Normal left and right ventricular size and function.  4.  Diffuse coronary artery disease.  Mild disease in LM, occluded mid LAD and severe disease in D1.  Patent LIMA  to distal LAD.  SVG to D1 is patent, but not able to see well after anastomosis, cannot exclude the occlusion.  Occluded mid RCA.  SVG to PDA is patent, PDA is a small vessel.     JUDY CT Scan on 10-:  1.  Left atrial appendage was ligated, but left a pouch 1.0 cm in depth and a 4.3 x 6 mm (0.22 cm  in area) unsealed tunnel in inferior side of left atrial appendage.   No evidence of JUDY thrombus formation.  2.  Moderately enlarged left atrium.  3.  Normal left and right ventricular size and function.  4.  Diffuse coronary artery disease.  Mild disease in LM, occluded mid LAD and severe disease in diagonal branches.  Patent LIMA to distal LAD, patent SVG to D. Mild disease in LCX.  Occluded mid RCA.  Patent SVG to PDA.   When compared to previous study on 6-, there is no significant interval change.    CT pulmonary vein study on 2-:  Reviewed images and findings are similar to previous study.    Lab Review   Lab Results   Component Value Date     (L) 05/05/2019    K 3.6 05/06/2019    CL 95 (L) 05/05/2019    CO2 25 05/05/2019    BUN 23 05/05/2019    CREATININE 1.04 05/05/2019    CALCIUM 9.5 05/05/2019     Lab Results   Component Value Date    WBC 9.6 05/02/2019    HGB 10.8 (L) 05/02/2019    HCT 33.4 (L) 05/02/2019    MCV 94 05/02/2019     05/05/2019     Lab Results   Component Value Date    CHOL 135 04/22/2019     Lab Results   Component Value Date    HDL 42 04/22/2019     Lab Results   Component Value Date    LDLCALC 78 04/22/2019     Lab Results   Component Value Date    TRIG 74 04/22/2019                        Thank you for allowing me to participate in the care of your patient.      Sincerely,     Solomon Vee MD     [facility]

## 2021-07-31 ENCOUNTER — HEALTH MAINTENANCE LETTER (OUTPATIENT)
Age: 76
End: 2021-07-31

## 2021-09-02 ENCOUNTER — TELEPHONE (OUTPATIENT)
Dept: FAMILY MEDICINE | Facility: CLINIC | Age: 76
End: 2021-09-02

## 2021-09-02 NOTE — TELEPHONE ENCOUNTER
[facility] phone call message- general phone call:    Reason for call:     Pt's wondering if he would qualify for the booster vaccine because of his open heart surgery from a couple years ago.     Action desired:     Call back from Dr. Ponce.     Return call needed: Yes    OK to leave a message on voice mail? Yes    Advised patient to response may take up to 2 business days: Yes    Primary language: English      needed? No    Call taken on September 2, 2021 at 2:44 PM by Miladis Chi

## 2021-09-03 NOTE — TELEPHONE ENCOUNTER
Please call:  Not at this time.  However, as I read the guidelines, he'll likely qualify based on age within the next 1-2 months, but we don't know yet.  PAOLA Ponce

## 2021-09-03 NOTE — TELEPHONE ENCOUNTER
Requested information given to pt he voiced understanding and appreciation for return call    btrn

## 2021-09-22 ENCOUNTER — TELEPHONE (OUTPATIENT)
Dept: CARDIOLOGY | Facility: CLINIC | Age: 76
End: 2021-09-22

## 2021-09-22 DIAGNOSIS — K21.9 GASTROESOPHAGEAL REFLUX DISEASE WITHOUT ESOPHAGITIS: ICD-10-CM

## 2021-09-22 DIAGNOSIS — I48.20 CHRONIC ATRIAL FIBRILLATION (H): ICD-10-CM

## 2021-09-22 RX ORDER — METOPROLOL TARTRATE 25 MG/1
25 TABLET, FILM COATED ORAL 2 TIMES DAILY
Qty: 180 TABLET | Refills: 3 | Status: SHIPPED | OUTPATIENT
Start: 2021-09-22 | End: 2022-02-11

## 2021-09-22 NOTE — TELEPHONE ENCOUNTER
[facility] phone call message- patient requesting a refill:    Full Medication Name: omeprazole (PRILOSEC) 20 MG DR capsule    Dose: Take 1 capsule by mouth twice daily    Pharmacy confirmed as   [facility] [place]  [address]  [address]  [place] [address]  Phone: [phone] Fax: [phone]  : Yes    Additional Comments: pt is completely out of this medication.  Please fill asap.     OK to leave a message on voice mail? Yes    Primary language: English      needed? No    Call taken on September 22, 2021 at 1:17 PM by Jefry Yuen

## 2021-09-22 NOTE — TELEPHONE ENCOUNTER
----- Message from Catherine Jordan sent at 9/21/2021  3:59 PM CDT -----  Regarding: Nanda pt  Patient has already contacted their pharmacy. The medication or refill issue is below:    Ordering Cardiologist: Nanda  Medication: Metoprolol  Issue / Concern: refill  Preferred Pharmacy/City: [facility] [address] [place] [address] DRIVE  Best Phone Number for Patient: [phone]    Additional Info:

## 2021-09-26 ENCOUNTER — HEALTH MAINTENANCE LETTER (OUTPATIENT)
Age: 76
End: 2021-09-26

## 2021-10-21 ENCOUNTER — OFFICE VISIT (OUTPATIENT)
Dept: FAMILY MEDICINE | Facility: CLINIC | Age: 76
End: 2021-10-21
Payer: COMMERCIAL

## 2021-10-21 VITALS
BODY MASS INDEX: 32.14 KG/M2 | DIASTOLIC BLOOD PRESSURE: 82 MMHG | RESPIRATION RATE: 16 BRPM | TEMPERATURE: 97.7 F | WEIGHT: 237 LBS | OXYGEN SATURATION: 98 % | SYSTOLIC BLOOD PRESSURE: 138 MMHG | HEART RATE: 78 BPM

## 2021-10-21 DIAGNOSIS — Z11.59 NEED FOR HEPATITIS C SCREENING TEST: ICD-10-CM

## 2021-10-21 DIAGNOSIS — Z23 ENCOUNTER FOR IMMUNIZATION: ICD-10-CM

## 2021-10-21 DIAGNOSIS — Z00.00 ENCOUNTER FOR MEDICARE ANNUAL WELLNESS EXAM: ICD-10-CM

## 2021-10-21 DIAGNOSIS — E78.2 MIXED HYPERLIPIDEMIA: Primary | ICD-10-CM

## 2021-10-21 DIAGNOSIS — R73.01 IMPAIRED FASTING GLUCOSE: ICD-10-CM

## 2021-10-21 DIAGNOSIS — Z23 HIGH PRIORITY FOR 2019-NCOV VACCINE: ICD-10-CM

## 2021-10-21 DIAGNOSIS — Z95.1 STATUS POST CORONARY ARTERY BYPASS GRAFT: ICD-10-CM

## 2021-10-21 LAB
ANION GAP SERPL CALCULATED.3IONS-SCNC: 14 MMOL/L (ref 5–18)
BUN SERPL-MCNC: 20 MG/DL (ref 8–28)
CALCIUM SERPL-MCNC: 10.1 MG/DL (ref 8.5–10.5)
CHLORIDE BLD-SCNC: 104 MMOL/L (ref 98–107)
CHOLEST SERPL-MCNC: 126 MG/DL
CO2 SERPL-SCNC: 21 MMOL/L (ref 22–31)
CREAT SERPL-MCNC: 1.05 MG/DL (ref 0.7–1.3)
FASTING STATUS PATIENT QL REPORTED: NORMAL
GFR SERPL CREATININE-BSD FRML MDRD: 68 ML/MIN/1.73M2
GLUCOSE BLD-MCNC: 109 MG/DL (ref 70–125)
HBA1C MFR BLD: 6.2 % (ref 0–5.6)
HDLC SERPL-MCNC: 44 MG/DL
LDLC SERPL CALC-MCNC: 64 MG/DL
POTASSIUM BLD-SCNC: 3.5 MMOL/L (ref 3.5–5)
SODIUM SERPL-SCNC: 139 MMOL/L (ref 136–145)
TRIGL SERPL-MCNC: 90 MG/DL

## 2021-10-21 PROCEDURE — 90662 IIV NO PRSV INCREASED AG IM: CPT | Performed by: FAMILY MEDICINE

## 2021-10-21 PROCEDURE — G0439 PPPS, SUBSEQ VISIT: HCPCS | Performed by: FAMILY MEDICINE

## 2021-10-21 PROCEDURE — 86803 HEPATITIS C AB TEST: CPT | Performed by: FAMILY MEDICINE

## 2021-10-21 PROCEDURE — 0004A COVID-19,PF,PFIZER (12+ YRS): CPT | Performed by: FAMILY MEDICINE

## 2021-10-21 PROCEDURE — 90471 IMMUNIZATION ADMIN: CPT | Performed by: FAMILY MEDICINE

## 2021-10-21 PROCEDURE — 80061 LIPID PANEL: CPT | Performed by: FAMILY MEDICINE

## 2021-10-21 PROCEDURE — 36415 COLL VENOUS BLD VENIPUNCTURE: CPT | Performed by: FAMILY MEDICINE

## 2021-10-21 PROCEDURE — 91300 COVID-19,PF,PFIZER (12+ YRS): CPT | Performed by: FAMILY MEDICINE

## 2021-10-21 PROCEDURE — 83036 HEMOGLOBIN GLYCOSYLATED A1C: CPT | Performed by: FAMILY MEDICINE

## 2021-10-21 PROCEDURE — 80048 BASIC METABOLIC PNL TOTAL CA: CPT | Performed by: FAMILY MEDICINE

## 2021-10-21 NOTE — PATIENT INSTRUCTIONS
"Will plan colonoscopy in April      Recombinant Zoster (Shingles) Vaccine, RZV:  \"Shingrix\"  What You Need to Know    Why get vaccinated?  Shingles (also called herpes zoster, or just zoster) is a painful skin rash, often with blisters. Shingles is caused by the varicella zoster virus, the same virus that causes chickenpox. After you have chickenpox, the virus stays in your body and can cause shingles later in life.    You can t catch shingles from another person. However, a person who has never had chickenpox (or chickenpox vaccine) could get chickenpox from someone with shingles.      Who gets shingles?  Shingles is far more common in people 50 years of age and older than in younger people, and the risk increases with age. Shingles is most common after the age of 60. It is also more common in people whose immune system is weakened because of a disease such as cancer, or by drugs such as steroids or chemotherapy.    At least 1 million people a year in the [place] get shingles.    About 1 out of 3 people will get shingles in their lifetime.    How do I know if I have shingles?  A shingles rash usually appears on one side of the face or body and heals within 2 to 4 weeks. Its main symptom is pain, which can be severe.                 Other symptoms can include fever, headache, chills, and upset stomach. Very rarely, a shingles infection can lead to pneumonia, hearing problems, blindness, brain inflammation (encephalitis), or death.    Can shingles cause long term problems?  Severe pain can continue even long after the rash has cleared up. This long-lasting pain is called post-herpetic neuralgia (PHN).  This occurs between 1 in 3 and 1 in 10 people that get shingles, and is more common with age.      How long has Shingrix been available?  Recombinant shingles vaccine was approved by FDA in 2017 for the prevention of shingles.   How effective is Shingrix?  Two studies have evaluated this question.    In one study " of adults over 50, the vaccine reduced the risk of shingles by 97%.    In a second study of more than 51800 adults 70 and older, the vaccine reduced the risk of shingles by 90%, and the risk of PHN by 89%.    Two doses, 2 to 6 months apart, are recommended for adults 50 and older.    This vaccine is also recommended for people who have already gotten the live shingles vaccine (Zostavax). There is no live virus in this vaccine.      Some people should not get this vaccine  Tell your vaccine provider if you:  Have any severe, life-threatening allergies. A person who has ever had a life-threatening allergic reaction after a dose of recombinant shingles vaccine, or has a severe allergy to any component of this vaccine, may be advised not to be vaccinated. Ask your health care provider if you want information about vaccine components.  Are pregnant or breastfeeding. There is not much information about use of recombinant shingles vaccine in pregnant or nursing women. Your healthcare provider might recommend delaying vaccination.  Are not feeling well. If you have a mild illness, such as a cold, you can probably get the vaccine today. If you are moderately or severely ill, you should probably wait until you recover. Your doctor can advise you.    What are the risks of the vaccine?  With any medicine, including vaccines, there is a chance of reactions.  After recombinant shingles vaccination, a person might experience:  Pain, redness, soreness, or swelling at the site of the injection  Headache, muscle aches, fever, shivering, fatigue  In clinical trials, most people got a sore arm with mild or moderate pain after vaccination, and some also had redness and swelling where they got the shot. Some people felt tired, had muscle pain, a headache, shivering, fever, stomach pain, or nausea.  About 1 out of 6 people who got recombinant zoster vaccine experienced side effects that prevented them from doing regular activities.   Symptoms went away on their own in about 2 to 3 days. Side effects were more common in younger people.    You should still get the second dose of recombinant zoster vaccine even if you had one of these reactions after the first dose.    Other things that could happen after this vaccine:  People sometimes faint after medical procedures, including vaccination. Sitting or lying down for about 15 minutes can help prevent fainting and injuries caused by a fall. Tell your provider if you feel dizzy or have vision changes or ringing in the ears.  Some people get shoulder pain that can be more severe and longer-lasting than routine soreness that can follow injections. This happens very rarely.  Any medication can cause a severe allergic reaction. Such reactions to a vaccine are estimated at about 1 in a million doses, and would happen within a few minutes to a few hours after the vaccination.  As with any medicine, there is a very remote chance of a vaccine causing a serious injury or death.    The safety of vaccines is always being monitored.  For more information, visit: www.cdc.gov/vaccinesafety/.    What if there is a serious problem?  What should I look for?    Look for anything that concerns you, such as signs of a severe allergic reaction, very high fever, or unusual behavior.    Signs of a severe allergic reaction can include hives, swelling of the face and throat, difficulty breathing, a fast heartbeat, dizziness, and weakness. These would usually start a few minutes to a few hours after the vaccination.    What should I do?    If you think it is a severe allergic reaction or other emergency that can t wait, call 9-1-1 or get to the nearest hospital. Otherwise, call your health care provider.    Afterward, the reaction should be reported to the Vaccine Adverse Event Reporting System (VAERS). Your doctor should file this report, or you can do it yourself through the VAERS website, or by calling  [phone].    How can I learn more?  Ask your health care provider. He or she can give you the vaccine package insert or suggest other sources of information.  Contact your local or state health department.  Contact the Centers for Disease Control and Prevention (CDC):  Call 1-306.769.9564 (3-811-NQX-INFO) or  Visit CDC s vaccines website    How often is it covered?  The shingles shot isn t covered by Medicare Part A (Hospital Insurance) or Medicare Part B (Medical Insurance). Generally, Medicare prescription drug plans (Part D) cover all commercially-available vaccines (like the shingles shot) needed to prevent illness. Contact your Medicare drug plan for more information about coverage.    Current cost of the 2 shot vaccine is approximately $280.    Patient Education

## 2021-10-21 NOTE — PROGRESS NOTES
Medicare Wellness Visit  Health Risk Assessment        Visual Acuity:  Wears Glasses        FALL RISK ASSESSMENT 10/21/2021 4/12/2018   Fallen 2 or more times in the past year? No No   Any fall with injury in the past year? No No            Health Risk Assessment / Review of Systems     Constitutional: Any fevers or night sweats? No     Eyes:  Vision problems   No     Hearing Do you feel you have hearing loss?  No     Cardiovascular: Any chest pain, fast or irregular heart beat, calf pain with walking?      YES irregular heart beat          Respiratory:   Any breathing problems or cough?   No     Gastrointestinal: Any stomach or stool problems?   No      Genitourinary: Do you have difficulty controlling urination?   No     Muscles and Joints: Any joint stiffness or soreness?   No     Skin: Any concerning lesions or moles?   No     Nervous System: Any loss of strength or feeling, numbness or tingling, shaking, dizziness, or headache?  No     Mental Health: Any depression, anxiety or problems sleeping?    No     Cognition: Do you have any problems with your memory?  No     PHQ-2 Score:   PHQ-2 ( 1999 Pfizer) 10/21/2021 9/21/2020   Q1: Little interest or pleasure in doing things 0 0   Q2: Feeling down, depressed or hopeless 0 0   PHQ-2 Score 0 0       PHQ-9 Score:   No flowsheet data found.         Medical Care     What other specialists or organizations are involved in your medical care?    Patient Care Team       Relationship Specialty Notifications Start End    Giovani Ponce MD PCP - General Family Practice  1/31/13     Phone: [phone] Fax: [phone] [address] [facility] [address]    Antonina Reyes, PharmD Pharmacist Pharmacist  10/9/19     Phone: [phone] Fax: [phone]         [facility]  [address] [place] [address]    Giovani Ponce MD Assigned PCP   5/27/21     Phone: [phone] Fax: [phone] [address] [facility] [address]     Solomon Vee MD Assigned Heart and Vascular Provider   7/16/21     Phone: [phone] Fax: [phone]         [address] [address]                 Social History / Home Safety     Social History     Tobacco Use     Smoking status: Light Tobacco Smoker     Packs/day: 0.00     Types: Cigars, Cigars     Smokeless tobacco: Former User     Types: Chew, Chew     Tobacco comment: 1 cigar per day   Substance Use Topics     Alcohol use: Yes     Comment: Alcoholic Drinks/day: rarely     Marital Status:  Who lives in your household? 2    Does your home have any of the following safety concerns? Loose rugs in the hallway, no grab bars in the bathroom, no handrails on the stairs or have poorly lit areas?  No     Do you feel threatened or controlled by a partner, ex-partner or anyone in your life? No     Has anyone hurt you physically, for example by pushing, hitting, slapping or kicking you   or forcing you to have sex? No          Functional Status     Do you need help with dressing yourself, bathing, or walking?No     Do you need help with the phone, transportation, shopping, preparing meals, housework, laundry, medications or managing money?No       Risk Behaviors and Healthy Habits     History   Smoking Status     Light Tobacco Smoker     Packs/day: 0.00     Types: Cigars, Cigars   Smokeless Tobacco     Former User     Types: Chew, Chew     Comment: 1 cigar per day     How many servings of fruits and vegetables do you eat a day? 1-2    Exercise: 6-7 days/week for an average of greater than 60 minutes walking  [employer], chopping wood    Do you frequently drive without a seatbelt? No     Do you use any other drugs? No         Do you use alcohol?no  Number of drinks per day   Number of drinking days a week       Frailty Assessment            1. By yourself and note using aids, do you have difficulty walking up 10 steps without resting?  No  (1 for Yes, 0 for No)    2. By yourself and not using  "mobility aids, do you have any difficulty walking several hundred yards? No  (1 for Yes, 0 for No)    3. Have you lost 10 or more pounds unintentionally in the previous year? No  (If \"Yes\" and >5% weight loss, then score 1.  Score 0, if <5% weight loss or \"No\" weight loss)    4. How much of the times during the past 3 weeks did you feel tired? 5. None of the time (\"1\" or \"2\" are scored 1, others 0)    5.  A doctor told the patient they had the following illnesses:   (0-4 = score 0, 5-11= score 1)        Katherine Nascimento CMA    "

## 2021-10-21 NOTE — PROGRESS NOTES
"  SUBJECTIVE:   Giacomo Schneider is a 76 year old male who presents for Preventive Visit.      Patient has been advised of split billing requirements and indicates understanding: Yes  Are you in the first 12 months of your Medicare Part B coverage?  No    Physical Health:    In general, how would you rate your overall physical health? good    Outside of work, how many days during the week do you exercise? 4-5 days/week    Outside of work, approximately how many minutes a day do you exercise?45-60 minutes    If you drink alcohol do you typically have >3 drinks per day or >7 drinks per week? No    Do you usually eat at least 4 servings of fruit and vegetables a day, include whole grains & fiber and avoid regularly eating high fat or \"junk\" foods? NO    Do you have any problems taking medications regularly?  No    Do you have any side effects from medications? none    Needs assistance for the following daily activities: no assistance needed    Which of the following safety concerns are present in your home?  none identified     Hearing impairment: Yes--doesn't want hearing aid    In the past 6 months, have you been bothered by leaking of urine? no    Mental Health:    In general, how would you rate your overall mental or emotional health? excellent  PHQ-2 Score: 0    Do you feel safe in your environment? Yes    Have you ever done Advance Care Planning? (For example, a Health Directive, POLST, or a discussion with a medical provider or your loved ones about your wishes): Yes, patient states has an Advance Care Planning document and will bring a copy to the clinic.    Additional concerns to address?  No    Fall risk:  Fallen 2 or more times in the past year?: No  Any fall with injury in the past year?: No    Cognitive Screenin) Repeat 3 items (Leader, Season, Table)    2) Clock draw:   NORMAL  3) 3 item recall: Recalls 3 objects  Results: 3 items recalled: COGNITIVE IMPAIRMENT LESS LIKELY    Mini-CogTM Copyright S " Melinda. Licensed by the author for use in [facility]; reprinted with permission ([email or web address]). All rights reserved.      Do you have sleep apnea, excessive snoring or daytime drowsiness?: no        Reviewed and updated as needed this visit by clinical staff  Tobacco  Allergies    Med Hx  Surg Hx  Fam Hx  Soc Hx        Reviewed and updated as needed this visit by Provider                Social History     Tobacco Use     Smoking status: Light Tobacco Smoker     Packs/day: 0.00     Types: Cigars, Cigars     Smokeless tobacco: Former User     Types: Chew, Chew     Tobacco comment: 1 cigar per day   Substance Use Topics     Alcohol use: Yes     Comment: Alcoholic Drinks/day: rarely                           Current providers sharing in care for this patient include:   Patient Care Team:  Giovani Ponce MD as PCP - General (Family Practice)  Antonina Reyes, PharmD as Pharmacist (Pharmacist)  Giovani Ponce MD as Assigned PCP  Solomon Vee MD as Assigned Heart and Vascular Provider    The following health maintenance items are reviewed in Epic and correct as of today:  Health Maintenance   Topic Date Due     HEPATITIS C SCREENING  Never done     ZOSTER IMMUNIZATION (1 of 2) Never done     FALL RISK ASSESSMENT  04/12/2019     COLORECTAL CANCER SCREENING  01/27/2020     INFLUENZA VACCINE (1) 09/01/2021     MEDICARE ANNUAL WELLNESS VISIT  10/21/2022     DTAP/TDAP/TD IMMUNIZATION (4 - Td or Tdap) 01/28/2024     ADVANCE CARE PLANNING  05/01/2024     LIPID  02/13/2025     PHQ-2  Completed     Pneumococcal Vaccine: 65+ Years  Completed     COVID-19 Vaccine  Completed     IPV IMMUNIZATION  Aged Out     MENINGITIS IMMUNIZATION  Aged Out     HEPATITIS B IMMUNIZATION  Aged Out       OS:  Constitutional, HEENT, cardiovascular, pulmonary, GI, , musculoskeletal, neuro, skin, endocrine and psych systems are negative, except as otherwise noted.  Hearing loss  Nocturia X 2-3  Controlled  reflux    OBJECTIVE:   /82 (BP Location: Left arm, Patient Position: Sitting, Cuff Size: Adult Large)   Pulse 78   Temp 97.7  F (36.5  C) (Oral)   Resp 16   Wt 107.5 kg (237 lb)   SpO2 98%   BMI 32.14 kg/m   Estimated body mass index is 32.14 kg/m  as calculated from the following:    Height as of 7/12/21: 1.829 m (6').    Weight as of this encounter: 107.5 kg (237 lb).  EXAM:   GENERAL: healthy, alert and no distress  EYES: Eyes grossly normal to inspection, PERRL and conjunctivae and sclerae normal  HENT: ear canals and TM's normal, nose and mouth without ulcers or lesions  NECK: no adenopathy, no asymmetry, masses, or scars and thyroid normal to palpation  RESP: lungs clear to auscultation - no rales, rhonchi or wheezes  CV: irregularly irregular rhythm, normal S1 S2, no S3 or S4, no murmur, click or rub, peripheral pulses strong and no peripheral edema  ABDOMEN: soft, nontender, no hepatosplenomegaly, no masses and bowel sounds normal  MS: no gross musculoskeletal defects noted, no edema    Diagnostic Test Results:  Labs reviewed in Epic    ASSESSMENT / PLAN:   {Estimated body mass index is 32.14 kg/m  as calculated from the following:    Height as of 7/12/21: 1.829 m (6').    Weight as of this encounter: 107.5 kg (237 lb).  ASSESSMENT AND PLAN:    1.  Medicare wellness.  He is due for influenza and COVID vaccine #3 given today.  Discussed a colonoscopy. Because of his history of adenomatous polyps, he is overdue from his q. 3 year recommendation.  He prefers to do this in April.  We will get that set up at that time.  2.  Regarding his atrial fibrillation, he had a left atrial appendage closure.  He is asymptomatic.  He is rate controlled.  Continue beta blocker and aspirin for secondary prevention of coronary artery disease.    3.  Regarding coronary artery disease, lipid profile is pending.  He has impaired glucose tolerance,.  We will get an A1c and a BMP today.  4.  Regarding his hypertension,  controlled on metoprolol and chlorthalidone.  Check BMP.  5.  Reflux, controlled on b.i.d. PPI. Attempts to taper in the past have not been successful.  We will follow and continue this medication.  6.  Advanced directive. He has a copy at home.  7.  History of sleep apnea, controlled on current treatment.    Shingles vaccine info given      He reports that he has been smoking cigars and cigars. He has been smoking about 0.00 packs per day. He has quit using smokeless tobacco.  His smokeless tobacco use included chew and chew.  Tobacco Cessation Action Plan:   Information offered: Patient not interested at this time    Appropriate preventive services were discussed with this patient, including applicable screening as appropriate for cardiovascular disease, diabetes, osteopenia/osteoporosis, and glaucoma.  As appropriate for age/gender, discussed screening for colorectal cancer, prostate cancer, breast cancer, and cervical cancer. Checklist reviewing preventive services available has been given to the patient.    Reviewed patients plan of care and provided an AVS. The Basic Care Plan (routine screening as documented in Health Maintenance) for Giacomo meets the Care Plan requirement. This Care Plan has been established and reviewed with the Patient.    Counseling Resources:  ATP IV Guidelines  Pooled Cohorts Equation Calculator  Breast Cancer Risk Calculator  BRCA-Related Cancer Risk Assessment: FHS-7 Tool  FRAX Risk Assessment  ICSI Preventive Guidelines  Dietary Guidelines for Americans, 2010  USDA's MyPlate  ASA Prophylaxis  Lung CA Screening    Giovani Ponce MD  [facility]

## 2021-10-22 LAB — HCV AB SERPL QL IA: NEGATIVE

## 2021-10-22 NOTE — RESULT ENCOUNTER NOTE
Mehrdad  Your tests look very good.  You do not have hepatitis C.  The hepatitis you had in your 20s was likely hepatitis A, which does not cause long term damage.  Your cholesterol is very well controlled--we want to keep the LDL under 70, and you are at 64.  Your kidney tests are normal.  Your blood sugars remain in the 'prediabetes range', but this has been relatively stable for nearly 10 years.  Stay active--that really helps you!  Thanks for your trust.  Saurabh Ponce MD

## 2021-11-15 ENCOUNTER — TELEPHONE (OUTPATIENT)
Dept: FAMILY MEDICINE | Facility: CLINIC | Age: 76
End: 2021-11-15

## 2021-11-15 NOTE — TELEPHONE ENCOUNTER
[facility] phone call message- general phone call:    Reason for call: He was in the hospital and they told him he needs a f/u with PCP he doesn't want to seen anyone but  and unfortunately he is out till 12/30 they want to know if  can get in in any sooner at all or if he is ok to wait till then he doesn't feel he should but wants to check with his PCP     Action desired: call back.      Return call needed: Yes    OK to leave a message on voice mail? Yes    Advised patient to response may take up to 2 business days: Yes    Primary language: English      needed? No    Call taken on November 15, 2021 at 8:32 AM by Alessia Hearn

## 2021-11-18 ENCOUNTER — OFFICE VISIT (OUTPATIENT)
Dept: PHARMACY | Facility: CLINIC | Age: 76
End: 2021-11-18

## 2021-11-18 ENCOUNTER — OFFICE VISIT (OUTPATIENT)
Dept: FAMILY MEDICINE | Facility: CLINIC | Age: 76
End: 2021-11-18
Payer: COMMERCIAL

## 2021-11-18 VITALS
BODY MASS INDEX: 31.87 KG/M2 | RESPIRATION RATE: 16 BRPM | OXYGEN SATURATION: 98 % | SYSTOLIC BLOOD PRESSURE: 138 MMHG | WEIGHT: 235 LBS | DIASTOLIC BLOOD PRESSURE: 85 MMHG | HEART RATE: 98 BPM | TEMPERATURE: 97.9 F

## 2021-11-18 DIAGNOSIS — H34.12 CENTRAL RETINAL ARTERY OCCLUSION OF LEFT EYE: Primary | ICD-10-CM

## 2021-11-18 DIAGNOSIS — E78.5 HYPERLIPIDEMIA WITH TARGET LDL LESS THAN 70: ICD-10-CM

## 2021-11-18 DIAGNOSIS — I48.11 LONGSTANDING PERSISTENT ATRIAL FIBRILLATION (H): ICD-10-CM

## 2021-11-18 DIAGNOSIS — S06.5XAA SUBDURAL HEMATOMA (H): ICD-10-CM

## 2021-11-18 DIAGNOSIS — I48.91 ATRIAL FIBRILLATION, UNSPECIFIED TYPE (H): ICD-10-CM

## 2021-11-18 DIAGNOSIS — I10 ESSENTIAL HYPERTENSION, BENIGN: ICD-10-CM

## 2021-11-18 DIAGNOSIS — I25.798 CORONARY ARTERY DISEASE OF OTHER BYPASS GRAFT WITH STABLE ANGINA PECTORIS (H): ICD-10-CM

## 2021-11-18 DIAGNOSIS — K21.9 GASTROESOPHAGEAL REFLUX DISEASE, UNSPECIFIED WHETHER ESOPHAGITIS PRESENT: Primary | ICD-10-CM

## 2021-11-18 PROCEDURE — 99214 OFFICE O/P EST MOD 30 MIN: CPT | Performed by: FAMILY MEDICINE

## 2021-11-18 PROCEDURE — 99207 PR NO CHARGE LOS: CPT | Performed by: PHARMACIST

## 2021-11-18 NOTE — PROGRESS NOTES
Patient Active Problem List    Diagnosis Date Noted     Coronary artery disease of other bypass graft with stable angina pectoris (H) 11/18/2021     Priority: Medium     Status post coronary artery bypass graft 05/01/2019     Priority: Medium     3 vessel CABG:  SULTANA to LAD,  Separate reverse saph to vein to LAD branch and right PDA  Ligation of atrial appendage in context of a fib  Dr Ashley Bai        History of colonic polyps 02/10/2017     Priority: Medium     Colonoscopy 1/27/2017 by [facility], Dr. Conroy, showing   Cecum:  One 2mm sessile polyp, removed  Transverse colon:  Three 3-7mm sessile polyps, removed  Sigmoid colon:  One 2mm sessile polyp, removed  Rectum:  One 4mm polyp, removed  Diverticulosis, internal hemorrhoids.         BPH (benign prostatic hyperplasia) 04/14/2015     Priority: Medium     Neg prostate bx 2009.         Impaired fasting glucose 01/28/2014     Priority: Medium     Atrial fibrillation (H) 03/20/2013     Priority: Medium       5/2019:  Ligation of atrial appendage in context of a fib.  Needs eliquis temporarily.         Basal cell carcinoma of skin 03/20/2013     Priority: Medium     Basal Cell Carcinoma Of The Skin 2009 (173.9); left cheek. excised by Dr Hernandez @ [facility]         Esophageal reflux 03/20/2013     Priority: Medium     Esophageal Reflux (530.81); benign endoscopy on EGD 6/22/04         Hyperlipidemia 03/20/2013     Priority: Medium     Tier 1  Diagnosis updated by automated process. Provider to review and confirm.       Health Care Home 03/20/2013     Priority: Medium     Tier 1  DX V65.8 REPLACED WITH [facility] CARE HOME (04/08/2013)       Obstructive sleep apnea 03/20/2013     Priority: Medium     Obstructive Sleep Apnea (327.23); intolerant of cpap         Spermatocele 03/20/2013     Priority: Medium     Spermatocele (608.1); left testicle confirmed by ultrasound.          Abnormal abdominal CT scan 04/14/2015     Priority: Low     Inguinal hernias  noted--planning repair fall 2015  Diverticulosis  Aortic and coronary atherosclerosis  bph       There are no exam notes on file for this visit.  Chief Complaint   Patient presents with     RECHECK     Follow up ER stroke      Blood pressure 138/85, pulse 98, temperature 97.9  F (36.6  C), temperature source Oral, resp. rate 16, weight 106.6 kg (235 lb), SpO2 98 %.  No results found for any visits on 11/18/21.  Mehrdad Schneider is here for followup of a number of complicated issues.  He was in his usual state of health until last Saturday when he had a sudden onset of left vision loss.  His wife convinced him to go to the ER at [place], where he was found to have a central retinal artery occlusion.  His symptoms were like a shade was coming down over his eye.  His symptoms have improved. His central vision, which he describes as a slit, is okay.  He has some cloudiness laterally and superiorly, but has improved some since his initial presentation.    While in the hospital, he had several imaging studies, which were done below.  This included a CTA of the neck that did not show any significant carotid stenosis, particularly on the left, but a head CT and MRI did find a small subdural hematoma.  In retrospect, about 6 weeks ago he hit his head very hard on a tailgate, but did not seek any care at that time.    In the hospital at [place], they discontinued his aspirin and started Eliquis 5 mg b.i.d. and then did a CT immediately thereafter that showed no expansion of bleeding.  He has another CT today.  He does not have any headache or neurologic changes.  In fact, he played softball earlier this week, and despite his vision problems, he was able to see well enough to hit a slow-pitch softball and play in the field at his baseline.  He denies any chest pain, shortness of breath or palpitations.  He has no edema.  No difficulty with speech or swallowing.    Of note, he had a coronary bypass and occlusion of his left atrial  appendage that was incomplete. Dr. Vee had recommended Eliquis, but the patient has refused and has been taking aspirin instead. The reason for recommending Eliquis was that there was a 5 mm gap that was felt to be enough to put him at risk for stroke.  The patient has seen an ophthalmologist and has a referral to a retinal surgeon in the next 2 weeks.  He had a CRP and a sed rate that were normal.  He has a followup with a neurosurgeon coming up on 12/08 as well.    OBJECTIVE:  The patient is alert, pleasant, in no acute distress. Neurologically, he is at his baseline.  I did not do a funduscopic exam.  Neck has no bruits.  Chest clear.  Heart irregularly irregular.  No murmur.  Extremities have no edema.  His neurologic exam is grossly nonfocal.    ASSESSMENT:    1.  Central retinal artery occlusion, subacute. Clinically, he seems to be getting better.  I am presuming this is embolic in origin, probably related to his atrial fibrillation since his carotids were negative.  He seems to have to agree with this.  He reluctantly consents to going back on Eliquis, and we will need to follow his head CTs carefully given his small subdural hematoma.  I think the subdural is chronic, probably related to the trauma about 6 weeks ago, but we cannot be sure about that.  He is aware that there is a small risk that it could expand on Eliquis, and he accepts that risk.  I am going to get him back to Dr. Vee, his cardiologist, for any additional comments about his evaluation.  2.  Regarding his coronary artery disease, he is stable and asymptomatic.  We will keep him off aspirin now that he is on Eliquis.  I do not think the bleeding risk with aspirin and Eliquis in light of his subdural is worth it at this point.  3.  Regarding the atrial fibrillation itself, it is rate controlled and asymptomatic with the exception of this possible thromboembolic complication, which we are now managing with Eliquis.        EXAM: CT ANGIO HEAD  NECK W IV CONT CODE CVA   LOCATION: [facility]   DATE/TIME: 11/13/2021 12:10 PM     INDICATION: Neuro deficit, acute, stroke suspected, left-sided visualized   COMPARISON: None.   CONTRAST: IOHEXOL 350 MG/ML IV SOLN 100 mL   TECHNIQUE: Head and neck CT angiogram with IV contrast. Noncontrast head CT followed by axial helical CT images of the head and neck vessels obtained during the arterial phase of intravenous contrast administration. Axial 2D reconstructed images and multiplanar 3D MIP reconstructed images of the head and neck vessels were performed by the technologist. Dose reduction techniques were used. All stenosis measurements made according to NASCET criteria unless otherwise specified.     FINDINGS:   NONCONTRAST HEAD CT:   Trace low-density subdural collection overlying the left cerebral convexity measuring 1-2 mm on image #17, series 2. No other evidence of acute intracranial hemorrhage or mass effect. Few scattered foci of decreased attenuation within the cerebral hemispheric white matter which are nonspecific, though most commonly ascribed to chronic small vessel ischemic disease. The ventricles and sulci are prominent consistent with mild brain parenchymal volume loss. Gray-white matter differentiation is maintained. The basilar cisterns are patent.     The globes are unremarkable. The partially imaged paranasal sinuses, mastoid air cells and middle ear cavities are unremarkable. The visualized skull base and calvarium are unremarkable.     HEAD CTA:   Examination of the anterior circulation demonstrates flow within the bilateral internal carotid and proximal aspects of the anterior and middle cerebral arteries. The anterior commuting artery is demonstrated.     Examination of the posterior circulation demonstrates flow within the distal vertebral, basilar, proximal aspects of the posterior cerebral arteries. The right and left posterior communicating arteries are not demonstrated  with certainty.     No evidence of pathologic vascular occlusion or saccular aneurysm within the visualized intracranial circulation by CT angiography.     NECK CTA:   The aortic arch has normal branching configuration. There is flow within the brachiocephalic, common carotid, proximal aspects of the subclavian arteries.     The right common carotid artery is patent. Examination of the right carotid bulb demonstrates calcified and atherosclerotic plaque without evidence of hemodynamically significant stenosis within the right internal carotid artery within the neck.     The left common carotid artery is patent. Examination of the left carotid bulb demonstrates no evidence of hemodynamically significant stenosis within the left internal carotid artery within the neck.     Calcified plaque continues to moderate stenosis of the right vertebral artery origin. The right and left vertebral arteries are patent.     No evidence of arterial dissection.     The parotid, submitted there, and thyroid glands are unremarkable.     The partially imaged lung apices are unremarkable.     No suspicious osseous lesions.     IMPRESSION:   HEAD CT:   1.  Trace low-density subdural collection overlying the left cerebral convexity measuring 1-2 mm on image #17, series 2.   2.  No other evidence of acute intracranial hemorrhage or mass effect.   3.  Mild nonspecific white matter changes.   4.  Mild brain parenchymal volume loss.     Dr. Shukla discussed the results with Dr. Akbar on 11/13/2021 12:15 PM by telephone.     HEAD CTA:   1.  No evidence of pathologic vascular occlusion or saccular aneurysm within the visualized intracranial circulation by CT angiography.     NECK CTA:   1.  No evidence of hemodynamically significant stenosis within either internal carotid artery within the neck.      Brain MRI 11/13:  IMPRESSION:   1.  Redemonstrated small crescentic subdural fluid collection along the left frontal convexity with minimal mass  effect on the underlying left frontal lobe. No midline shift/herniation. Continued follow-up recommended.   2.  No other findings concerning for acute intracranial process.   3.  Brain atrophy and presumed chronic small vessel ischemic changes.    Repeat head CT 11/14 unchanged.      ESR/CRP normal, PT/PTT normal.  CBC normal.  BMP normal.    Findings from optometrist yesterday:  CRAO noted OS, retinal edema most noteable at superior and inferior arcades, no emboli visible    ASSESSMENT:  ICD-10-CM   1. Central retinal artery occlusion of left eye H34.12   2. Atrial fibrillation, unspecified type (HC) I48.91   3. Coronary artery disease involving coronary bypass graft of native heart without angina pectoris I25.810       PLAN:  Findings were discussed with patient/guardian.  He has had CT, MRI, has follow up CT scheduled for tomorrow and appt with Neurosurgery soon.  Follow up with PCP, he has appt tomorrow  Recommend bloodwork including CRP, sed rate carotid evaluation/US  He will be contacting and following up with his Cardiologist in the next few days  Asap referral to [facility] placed  Call or return to clinic PRN if these symptoms worsen or fail to improve as anticipated    Total time preparing to see this patient, face-to-face time, and coordinating care time on the same calendar date: 42 minutes.   Stephani Richmond O.D. 11/17/2021    Electronically signed by Stephani Richmond OD at 11/18/2021 11:27 AM CST      Note from Dr Vee 7/12/21:  His pulmonary CT reported left atrial appendage was ligated with residual around 5 mm sized communication between LA and JUDY.  He refused to have any further evaluation of left atrium appendage.  The patient has been doing quite well.  Continue to monitor him.  Currently he is on aspirin 81 mg daily.

## 2021-11-18 NOTE — PROGRESS NOTES
"Medication Therapy Management (MTM) Encounter    ASSESSMENT:                            Medication Adherence/Access: No issues with adherence, but states he does not like taking Eliquis.     CVA/Afib: Controlled for now. Should be on Eliquis long term due to Afib diagnosis and to prevent another CVA. May be appropriate to discontinue ASA due to increased risk of bleeding along with Eliquis.     Hypertension: Blood pressure slightly elevated in clinic today. Could consider addition of another BP agent in the future if it remains elevated.     Hyperlipidemia: Controlled per 10/21/21 labs.     General health: Stable    Esophageal reflux: Stable for now, but may be beneficial to taper off of PPI and switch to H2RA such as Pepcid.     PLAN:                            1) Continue taking Eliquis 5 mg BID  2) Discontinue aspirin 81 mg daily    Follow-up: As needed with PharmD     Medication issues to be addressed at a future visit      Consider tapering off of omeprazole and initiating Pepcid    Consider additional blood pressure agent if BP readings remain elevated    SUBJECTIVE/OBJECTIVE:                          Giacomo Schneider is a 76 year old male seen for a TCM visit.  He was discharged from [facility] on 11/14 for CVA (occlusion of retinal artery). Today's visit is a co-visit with Dr. Ponce. Patient was accompanied by his wife. Patient is seeing provider after our visit today.     Reason for visit: TCM/hospital follow up.    Allergies/ADRs: Reviewed in chart  Past Medical History: Reviewed in chart  Tobacco: He reports that he quit smoking about 9 months ago. His smoking use included cigars. He smoked 0.00 packs per day. He has quit using smokeless tobacco.  His smokeless tobacco use included chew.    Medication Adherence/Access: Patient has assistance with medication administration: wife gives him medicine dose \"cups\" throughout day.  Patient takes medications 2 time(s) per day.   Per patient, misses " "medication 0 times per week.   The patient fills medications at [place]: NO, fills medications at [facility].    CVA 2/2 Afib: Started Eliquis 5 mg BID at discharge. States he previously took Eliquis in 2019. Self-discontinued because he is \"stubborn\" and wanted to continue to play softball without worry of bleedings. Denies increased bleeding since restarting Eliquis. Notes hemorrhoids started bleeding again on Tuesday but says it resolved and this is a normal occurrence for him. Was taking ASA 81 mg/d. Stopped Sunday since hospital staff said he didn't need it. He is curious if he needs to restart it today. Takes metoprolol tartrate 25 mg BID for afib. Denies side effects (dizziness, lightheadedness)     Hypertension: Takes chlorthalidone 25 mg/d. Denies side effects. States he used to check BP every week but doctor instructed him to only check once monthly.     Hyperlipidemia: Takes atorvastatin 80 mg/d. Denies myalgias.     General health: Vitamin C 1000 mg/d, calcium + vit D 600 mg-400 unit/d, Omega 3 Krill oil 1 tab/d, vitamin E 400 unit/d. Additionally uses PRN Refresh eye drops, diprolene cream, and ketoconazole 2% cream. Reports no side effects.     Esophageal reflux: Takes omeprazole 20 mg BID. States he has taken this for a long time and it seems to work well.      BP Readings from Last 3 Encounters:   11/18/21 138/85   10/21/21 138/82   07/12/21 110/70     ----------------  Post Discharge Medication Reconciliation Status: discharge medications reconciled, continue medications without change.    I spent 15 minutes with this patient today. All changes were made via collaborative practice agreement with Giovani Ponce MD. A copy of the visit note was provided to the patient's primary care provider.    The patient was given a summary of these recommendations. See Provider note/AVS from today.     Patricia Lawson, PharmD Candidate 2022     I was present with the pharmacy student who participated in the " service and in the documentation of this note. I have verified the history, personally performed the medical decision making, and have verified the content of the note, which accurately reflects my assessment of the patient and the plan of care.   Genny Boles, [facility], PharmD      Medication Therapy Recommendations  No medication therapy recommendations to display

## 2021-11-19 ENCOUNTER — TELEPHONE (OUTPATIENT)
Dept: FAMILY MEDICINE | Facility: CLINIC | Age: 76
End: 2021-11-19

## 2021-11-19 NOTE — TELEPHONE ENCOUNTER
Communicated below information to Giacomo. He is agreeable to plan. Will check in next week to ensure CT is scheduled. ./LR

## 2021-11-19 NOTE — TELEPHONE ENCOUNTER
"Message received from Dr. Ponce:    If [11/18/21] head CT shows no change (I.e. no increase) in the size of his subdural, then she should continue eliquis 5 mg AM and PM.     I'd like him to stay off of his aspirin while he's on eliquis.   I will order another head CT for him @  in 10-14 days, assuming his current CT is unchanged.   He should be aware of any signs of worsening:  increased headache, confusion, nausea, or new numbness/tingling/weakness on one side of the body.     He should keep his f/up plans with the retinal specialist, neurosurgeon, and should get in to see the cardiologist in the near future (referral placed and message sent).     Results faxed to clinic showing \"redemonstrated left cerebral convexity subdural collection, not significantly changed since 11/13/21.\"    Called patient, he was not home but spoke with wife. I will call back at 3pm to communicate directions to patient. ./LR  "

## 2021-11-24 ENCOUNTER — OFFICE VISIT (OUTPATIENT)
Dept: CARDIOLOGY | Facility: CLINIC | Age: 76
End: 2021-11-24
Attending: FAMILY MEDICINE
Payer: COMMERCIAL

## 2021-11-24 VITALS
SYSTOLIC BLOOD PRESSURE: 110 MMHG | DIASTOLIC BLOOD PRESSURE: 70 MMHG | HEART RATE: 68 BPM | WEIGHT: 235.2 LBS | RESPIRATION RATE: 20 BRPM | HEIGHT: 73 IN | BODY MASS INDEX: 31.17 KG/M2

## 2021-11-24 DIAGNOSIS — E78.2 MIXED HYPERLIPIDEMIA: ICD-10-CM

## 2021-11-24 DIAGNOSIS — I25.798 CORONARY ARTERY DISEASE OF OTHER BYPASS GRAFT WITH STABLE ANGINA PECTORIS (H): ICD-10-CM

## 2021-11-24 DIAGNOSIS — H34.12 CENTRAL RETINAL ARTERY OCCLUSION OF LEFT EYE: ICD-10-CM

## 2021-11-24 DIAGNOSIS — I10 PRIMARY HYPERTENSION: ICD-10-CM

## 2021-11-24 DIAGNOSIS — I48.11 LONGSTANDING PERSISTENT ATRIAL FIBRILLATION (H): Primary | ICD-10-CM

## 2021-11-24 PROCEDURE — 99214 OFFICE O/P EST MOD 30 MIN: CPT | Performed by: INTERNAL MEDICINE

## 2021-11-24 ASSESSMENT — MIFFLIN-ST. JEOR: SCORE: 1832.8

## 2021-11-24 NOTE — LETTER
Patient:   ALISIA US            MRN: [id number]            FIN: [id number]              Age:   86 years     Sex:  MALE     :  [birth year]   Associated Diagnoses:   None   Author:   YOEL HARDY     Subjective   SLEEPY BUT AROUSABLE IN NAD  IN SINUS     Health Status   Current medications:    Medications (13) Active  Scheduled: (9)  AMIODArone 200 mg tab  400 mg 2 tab, Oral, Q12H  Bacitracin-polymyxin B ointment 15 gm  1 application, Topical, BID  Cyanocobalamin (B-12) 500 mcg tab  1,000 mcg 2 tab, Oral, Daily  Escitalopram 5 mg tab  5 mg 1 tab, Oral, Daily  Heparin 5,000 unit/1 mL inj MDV  5,000 unit 1 mL, Subcutaneous, Q8H  HydrALAZINE 50 mg tab  50 mg 1 tab, Oral, Q8H  Levothyroxine 125 mcg tab  125 mcg 1 tab, Oral, QAM at 6  Pneumococcal adult vaccine 23-polyvalent 0.5 mL IM inj SDV  0.5 mL, IM, On Call  QUEtiapine 25 mg tab  25 mg 1 tab, Oral, Q Bedtime  Continuous: (0)  PRN: (4)  Acetaminophen 325 mg tab  650 mg 2 tab, Oral, Q6H  HydrALAZINE 20 mg/1 mL inj SDV  10 mg 0.5 mL, Slow IV Push, Q6H  QUEtiapine 12.5 mg/0.5 tab (1/2 of 25 mg)  12.5 mg 0.5 tab, Oral, As Directed PRN  Sodium chloride 0.65% nasal spray 45 mL BULK  1 spray, Each Nostril, TID      Review of Systems   Constitutional:  Negative except as documented in history of present illness.   Cardiovascular:  Negative except as documented in history of present illness.   Respiratory:  Negative except as documented in history of present illness.   Gastrointestinal:  Negative except as documented in history of present illness.   ALL other systems reviewed  and negative except as above and  in the HPI     Objective   Intake and Output   I&O 24 hr   I & O between:  19-SEP-2020 11:42 TO 20-SEP-2020 11:42  Med Dosing Weight:  71.5  kg   12-SEP-2020  24 Hour Intake:   420.00  ( 5.87 mL/kg )  24 Hour Output:   850.00           24 Hour Urine/Stool Output:   0.0  24 Hour Balance:   -430.00           24 Hour Urine Output:   850.00  ( 0.50 mL/kg/hr  11/24/2021    Giovani Ponce MD  [address] [place] [id number]    RE: Giacomo Schneider       Dear Colleague,    I had the pleasure of seeing Giacomo Schneider in the [facility].  Progress Notes by Solomon Vee MD at 1/20/2021  7:50 AM     Author: Solomon Vee MD Service: -- Author Type: Physician    Filed: 1/20/2021  8:25 AM Encounter Date: 1/20/2021 Status: Signed    : Solomon Vee MD (Physician)           Click to link to [facility] NOTE       Assessment/Plan:   1.  Coronary artery disease s/p 3 V CABG LIMA to LAD, SVG to D1, SVG to RCA on 5-1-2019: The patient had no chest pain or shortness of breath.  Continue aspirin, Lipitor, metoprolol.     2.  Persistent atrial fibrillation: The patient's heart rate has been controlled very well.  He had no palpitations and shortness of breath.    Continue metoprolol 25 mg twice a day.     His AMK0IN3-LAPw score is 5.  The patient discontinued Eliquis by himself and the refused to take Eliquis in the past.  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  Now he is back on Eliquis 5 mg twice a day.     His pulmonary CT reported left atrial appendage was ligated with residual around 5 mm sized communication between LA and JUDY.    I am going to talk to interventional cardiologist to see if he can put a plug in and close the communication between left atrium appendage and left atrium.  The patient agreed with the plan.     3.  Essential hypertension: His blood pressure has been well controlled with metoprolol 25 mg twice a day.  The target of his blood pressure less than 140/90 mmHg.     4.  Hyperlipidemia.  Continue Lipitor 80 mg nightly.     Thank you for the opportunity to be involved in the care of Giacomo Schneider Sr.. If you have any questions, please feel free to contact me.  I will see the patient again in 6 months.    Much or all of the text in this  )                   Urine Count:  1    Stool Count:  1       VS/Measurements     Vitals between:   19-SEP-2020 11:42:38   TO   20-SEP-2020 11:42:38                   LAST RESULT MINIMUM MAXIMUM  Temperature 36.7 36.7 37.1  Heart Rate 78 67 78  Respiratory Rate 18 18 18  NISBP           121 121 173  NIDBP           68 59 78  NIMBP           86 86 108  SpO2                    96 96 99    General:  No acute distress.    Eye:  Normal conjunctiva.    HENT:  Oral mucosa is moist.    Neck:  Supple, Non-tender, No jugular venous distention.    Respiratory:       Breath sounds: Bilateral, Diminished.    Cardiovascular:  S1, S2.    Gastrointestinal:  Soft, Non-tender.    Genitourinary:  No costovertebral angle tenderness.    Lymphatics:  No lymphadenopathy neck, axilla, groin.    Integumentary:  Warm, Moist.    Neurologic:  Alert.    Psychiatric:  Cooperative, Appropriate mood & affect.      Results Review   General results   Interpretation:   Cardiovascular Labs   No cardiovascular lab results found over the previous 48 hours.     No Qualifying Labs are resulted on this patient in the last 24 hours     Telemetry reviewed: SR 60-90     Impression and Plan   Syncopal fall.    -12-lead EKG reviewed; sinus rhythm with presence of right bundle branch block, no evidence of high grade AVB  -2D echo: normal LV function.  Mild AS, mild AR, moderately dilated left and right atrium, moderate TR.  -TSH and Free T4 wnl   -Has sinus bradycardia during sleep but heart rate during daytime within normal limits.  -Carotid sinus massage performed.  No carotid hypersensitivity, bradycardia arrhythmia or pauses  -Continue to monitor on telemetry.   pacemaker  -30 day event monitor on discharge  New PAF  -previously had sinus bradycardia with heart rates ranging from 30-50's while asleep.  Occasional 1-2 second pauses noted during sleep.   -per Dr Moore, patient had AF years ago related to thyrotoxicosis He is s/p radioactive iodine  note was generated through the use of Dragon Dictate voice-to-text software. Errors in spelling or words which seem out of context are unintentional.   Sound alike errors, in particular, may have escaped editing.       History of Present Illness:   It is my pleasure to see Giacomo Schneider Sr. at the [facility] clinic for routine cardiology follow-up.  Giacomo Schneider Sr. is a 76 y.o. male with a medical history of  persistent atrial fibrillation, hyperlipidemia, obstructive sleep apnea, recently diagnosed 2 V coronary artery disease s/p 3 V CABG LIMA to LAD, SVG to D1 and SVG to RCA and JUDY ligation on 5-1-2019 .      The patient states that he developed occlusion of left retinal artery on November 14, 2021.  Now he is back on Eliquis 5 mg twice a day.  He had no chest pain or chest discomfort.  He complains of chronic shortness of breath on exertion which has been stable.  He had no palpitations, shortness of breath, dizziness, orthopnea, PND, or leg swelling. His blood pressure and heart rate have been controlled well.     He had no side effects from his current medications.    Past Medical History:     Patient Active Problem List   Diagnosis     Mixed hyperlipidemia     Atrial fibrillation (H)     Adult Sleep Apnea     Esophageal Reflux     Bilateral inguinal hernia     Essential hypertension     FONSECA (dyspnea on exertion)     Abnormal cardiovascular stress test     Abnormal computed tomography angiography (CTA)     Elevated coronary artery calcium score     S/P CABG (coronary artery bypass graft)     ARF (acute respiratory failure) (H)     Anemia due to blood loss, acute     Coronary artery disease involving coronary bypass graft of native heart without angina pectoris       Past Surgical History:     Past Surgical History:   Procedure Laterality Date     CV CORONARY ANGIOGRAM N/A 4/22/2019    Procedure: Coronary Angiogram;  Surgeon: Qamar Martin MD;  Location: [facility];   Service: Cardiology     LAPAROSCOPIC INGUINAL HERNIA REPAIR Bilateral 12/3/2015    Procedure: LAPAROSCOPIC BILATERAL INGUINAL HERNIA REPAIR WITH BILATERAL MESH;  Surgeon: Bran Puckett MD;  [place] [facility] [place]:        Family History:     Family History   Problem Relation Age of Onset     Hypertension Father      No Medical Problems Mother      No Medical Problems Sister      No Medical Problems Brother      No Medical Problems Maternal Aunt      No Medical Problems Maternal Uncle      No Medical Problems Paternal Aunt      No Medical Problems Paternal Uncle      No Medical Problems Maternal Grandmother      No Medical Problems Maternal Grandfather      No Medical Problems Paternal Grandmother      No Medical Problems Paternal Grandfather      Anesthesia problems Neg Hx      Cancer Neg Hx      Clotting disorder Neg Hx      Diabetes Neg Hx      Dislocations Neg Hx      Osteoporosis Neg Hx      Psoriasis Neg Hx      Rashes / Skin problems Neg Hx      Severe sprains Neg Hx      Ulcerative colitis Neg Hx         Social History:    reports that he has been smoking cigars. He has quit using smokeless tobacco.  His smokeless tobacco use included chew. He reports current alcohol use. He reports that he does not use drugs.    Review of Systems:   General: WNL  Eyes: WNL  Ears/Nose/Throat: Hearing Loss  Lungs: Snoring  Heart: Shortness of Breath with activity  Stomach: WNL  Bladder: Frequent Urination at Night  Muscle/Joints: WNL  Skin: Rash  Nervous System: WNL  Mental Health: Anxiety     Blood: WNL    Meds:     Current Outpatient Medications:      acetaminophen (TYLENOL) 325 MG tablet, Take 2 tablets (650 mg total) by mouth every 4 (four) hours as needed., Disp: , Rfl: 0     ascorbic acid, vitamin C, (VITAMIN C) 1000 MG tablet, Take 1,000 mg by mouth daily., Disp: , Rfl:      aspirin 81 MG EC tablet, Take 81 mg by mouth at bedtime., Disp: , Rfl:      atorvastatin (LIPITOR) 80 MG tablet, TAKE 1 TABLET  treatment years ago.  -TSH 3.2  -telemetry reviewed, PAF noted 9/17 and again 9/18, HR up to 130s  -metoprolol discontinued again 9/19  due to known bradycardia  -start  amiodarone 400 bid in an attempt to maintain SR and prevent recurrent episodes of AF with RVR   -has remained in SR the past 24 hrs  -if developed significant bradycardia then he might need DDDR pacemaker implantation,discussed with Dr Dobbins  -NOT A CANDIDATE FOR ANTICOAGULATION THERAPY DUE TO ICB  Hypertension with hypertensive heart disease  -on Hydralazine 50mg tid  Intracranial and Subarachnoid hemorrhage   -CT head reviewed  -No plan for neurosx intervention  -Further management per primary team   BY MOUTH ONCE DAILY AT BEDTIME, Disp: 90 tablet, Rfl: 2     betamethasone, augmented, (DIPROLENE-AF) 0.05 % cream, Apply 1 application topically as needed.    , Disp: , Rfl:      calcium carbonate-vitamin D3 (CALCIUM 600 + D,3,) 600 mg(1,500mg) -400 unit per tablet, Take 1 tablet by mouth daily., Disp: , Rfl:      carboxymethylcellulose (REFRESH PLUS) 0.5 % Dpet ophthalmic dropperette, Administer 1 drop to both eyes as needed. , Disp: , Rfl:      chlorthalidone (HYGROTEN) 25 MG tablet, Take 25 mg by mouth at bedtime., Disp: , Rfl:      glucosamine-chondroitin (OSTEO BI-FLEX) 250-200 mg Tab, Take 2 tablets by mouth daily.    , Disp: , Rfl:      hydrocortisone valerate (WEST-SAVANAH) 0.2 % ointment, Apply 1 application topically as needed (uses on hands and ankle).    , Disp: , Rfl:      ketoconazole (NIZORAL) 2 % cream, Apply 1 application topically as needed. Apply a thin layer to affected area(s) twice daily as needed  For athletes foot   , Disp: , Rfl:      krill-om-3-dha-epa-phospho-ast (MEGARED OMEGA-3 KRILL OIL) 673-608-58-64 mg cap, Take 1 capsule by mouth daily., Disp: , Rfl:      metoprolol tartrate (LOPRESSOR) 50 MG tablet, Take 0.5 tablets (25 mg total) by mouth 2 (two) times a day., Disp: 90 tablet, Rfl: 3     multivitamin therapeutic tablet, Take 1 tablet by mouth daily., Disp: , Rfl:      omeprazole (PRILOSEC) 20 MG capsule, Take 20 mg by mouth 2 (two) times a day.    , Disp: , Rfl:      s-adenosylmethionine (CECY-E) 400 mg Tab, Take 1 tablet by mouth daily., Disp: , Rfl:      vitamin E 400 unit capsule, Take 400 Units by mouth daily., Disp: , Rfl:       Allergies:   Budesonide, Sulfa (sulfonamide antibiotics), and Thimerosal      Objective:      Physical Exam  (!) 235 lb (105.2 kg)  6' (1.829 m)  Body mass index is 31.46 kg/m .  /70 (Patient Site: Left Arm, Patient Position: Sitting, Cuff Size: Adult Large)   Pulse 96   Resp 20   Ht 6' (1.829 m)   Wt (!) 235 lb   BMI 31.46 kg/m      General  Appearance:   Awake, Alert, No acute distress.   HEENT:  Pupil equal and reactive to light. No scleral icterus; the mucous membranes were moist.   Neck: No cervical bruits. No JVD. No thyromegaly.     Chest: The spine was straight. The chest was symmetric.   Lungs:   Respirations unlabored; Lungs are clear to auscultation. No crackles. No wheezing.   Cardiovascular:   Irrgular rhythm and rate, normal first and second heart sounds with no murmurs. No rubs or gallops.    Abdomen:  Soft. No tenderness. Non-distended. Bowels sounds are present   Extremities: Equal tibial pulses. No leg edema.   Skin: No rashes or ulcers. Warm, Dry.   Musculoskeletal: No tenderness. No deformity.   Neurologic: Mood and affect are appropriate. No focal deficits.         EKG: Personally reviewed  Sinus rhythm with Fusion complexes   Right bundle branch block   Abnormal ECG   When compared with ECG of 30-APR-2019 08:56,   Sinus rhythm has replaced Atrial fibrillation   Right bundle branch block is now Present   QT has lengthened     Cardiac Imaging Studies  ECHO on 4-:    No previous study for comparison.    Left ventricle ejection fraction is normal. The estimated left ventricular ejection fraction is 55%.    Normal left ventricular size.    Normal right ventricular size and systolic function.    Mild mitral regurgitation     Coronary angiogram on 4-:  Angiography via left radial  LM normal  LAD mid 100% with collaterals via diagonal; diagonal with 90% lesion  Circ large mild dz  RCA 70% distal RCA segment     CT pulmonary vein with contrast to evaluate JUDY on 6-:  1.  Left atrial appendage was ligated, but left a pouch 1.1 cm in depth and a 5 mm unsealed tunnel in inferior of left atrial appendage.   No evidence of JUDY thrombus formation.  2.  Moderately enlarged both atria.  3.  Normal left and right ventricular size and function.  4.  Diffuse coronary artery disease.  Mild disease in LM, occluded mid LAD and severe  disease in D1.  Patent LIMA to distal LAD.  SVG to D1 is patent, but not able to see well after anastomosis, cannot exclude the occlusion.  Occluded mid RCA.  SVG to PDA is patent, PDA is a small vessel.     JUDY CT Scan on 10-:  1.  Left atrial appendage was ligated, but left a pouch 1.0 cm in depth and a 4.3 x 6 mm (0.22 cm  in area) unsealed tunnel in inferior side of left atrial appendage.   No evidence of JUDY thrombus formation.  2.  Moderately enlarged left atrium.  3.  Normal left and right ventricular size and function.  4.  Diffuse coronary artery disease.  Mild disease in LM, occluded mid LAD and severe disease in diagonal branches.  Patent LIMA to distal LAD, patent SVG to D. Mild disease in LCX.  Occluded mid RCA.  Patent SVG to PDA.   When compared to previous study on 6-, there is no significant interval change.    CT pulmonary vein study on 2-:  Reviewed images and findings are similar to previous study.    Lab Review   Lab Results   Component Value Date     (L) 05/05/2019    K 3.6 05/06/2019    CL 95 (L) 05/05/2019    CO2 25 05/05/2019    BUN 23 05/05/2019    CREATININE 1.04 05/05/2019    CALCIUM 9.5 05/05/2019     Lab Results   Component Value Date    WBC 9.6 05/02/2019    HGB 10.8 (L) 05/02/2019    HCT 33.4 (L) 05/02/2019    MCV 94 05/02/2019     05/05/2019     Lab Results   Component Value Date    CHOL 135 04/22/2019     Lab Results   Component Value Date    HDL 42 04/22/2019     Lab Results   Component Value Date    LDLCALC 78 04/22/2019     Lab Results   Component Value Date    TRIG 74 04/22/2019             cc:   Giovani Ponce MD  [address] [place] [address]

## 2021-11-24 NOTE — PROGRESS NOTES
Progress Notes by Solomon Vee MD at 1/20/2021  7:50 AM     Author: Solomon Vee MD Service: -- Author Type: Physician    Filed: 1/20/2021  8:25 AM Encounter Date: 1/20/2021 Status: Signed    : Solomon Vee MD (Physician)           Click to link to [facility] Heart [facility] HEART [facility] NOTE       Assessment/Plan:   1.  Coronary artery disease s/p 3 V CABG LIMA to LAD, SVG to D1, SVG to RCA on 5-1-2019: The patient had no chest pain or shortness of breath.  Continue aspirin, Lipitor, metoprolol.     2.  Persistent atrial fibrillation: The patient's heart rate has been controlled very well.  He had no palpitations and shortness of breath.    Continue metoprolol 25 mg twice a day.     His [id number] score is 5.  The patient discontinued Eliquis by himself and the refused to take Eliquis in the past.  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  Now he is back on Eliquis 5 mg twice a day.     His pulmonary CT reported left atrial appendage was ligated with residual around 5 mm sized communication between LA and JUDY.    I am going to talk to interventional cardiologist to see if he can put a plug in and close the communication between left atrium appendage and left atrium.  The patient agreed with the plan.     3.  Essential hypertension: His blood pressure has been well controlled with metoprolol 25 mg twice a day.  The target of his blood pressure less than 140/90 mmHg.     4.  Hyperlipidemia.  Continue Lipitor 80 mg nightly.     Thank you for the opportunity to be involved in the care of Giacomo Schneider . If you have any questions, please feel free to contact me.  I will see the patient again in 6 months.    Much or all of the text in this note was generated through the use of Dragon Dictate voice-to-text software. Errors in spelling or words which seem out of context are unintentional.   Sound alike errors, in particular, may have escaped editing.       History of Present  Illness:   It is my pleasure to see Giacomo Schneider Sr. at the [facility] clinic for routine cardiology follow-up.  Giacomo Schneider Sr. is a 76 y.o. male with a medical history of  persistent atrial fibrillation, hyperlipidemia, obstructive sleep apnea, recently diagnosed 2 V coronary artery disease s/p 3 V CABG LIMA to LAD, SVG to D1 and SVG to RCA and JUDY ligation on 5-1-2019 .      The patient states that he developed occlusion of left retinal artery on November 14, 2021.  Now he is back on Eliquis 5 mg twice a day.  He had no chest pain or chest discomfort.  He complains of chronic shortness of breath on exertion which has been stable.  He had no palpitations, shortness of breath, dizziness, orthopnea, PND, or leg swelling. His blood pressure and heart rate have been controlled well.     He had no side effects from his current medications.    Past Medical History:     Patient Active Problem List   Diagnosis     Mixed hyperlipidemia     Atrial fibrillation (H)     Adult Sleep Apnea     Esophageal Reflux     Bilateral inguinal hernia     Essential hypertension     FONSECA (dyspnea on exertion)     Abnormal cardiovascular stress test     Abnormal computed tomography angiography (CTA)     Elevated coronary artery calcium score     S/P CABG (coronary artery bypass graft)     ARF (acute respiratory failure) (H)     Anemia due to blood loss, acute     Coronary artery disease involving coronary bypass graft of native heart without angina pectoris       Past Surgical History:     Past Surgical History:   Procedure Laterality Date     CV CORONARY ANGIOGRAM N/A 4/22/2019    Procedure: Coronary Angiogram;  Surgeon: Qamar Martin MD;  Location: [facility] Cath Lab;  Service: Cardiology     LAPAROSCOPIC INGUINAL HERNIA REPAIR Bilateral 12/3/2015    Procedure: LAPAROSCOPIC BILATERAL INGUINAL HERNIA REPAIR WITH BILATERAL MESH;  Surgeon: Bran Puckett MD;  Location: [facility] OR;  Service:         Family History:     Family History   Problem Relation Age of Onset     Hypertension Father      No Medical Problems Mother      No Medical Problems Sister      No Medical Problems Brother      No Medical Problems Maternal Aunt      No Medical Problems Maternal Uncle      No Medical Problems Paternal Aunt      No Medical Problems Paternal Uncle      No Medical Problems Maternal Grandmother      No Medical Problems Maternal Grandfather      No Medical Problems Paternal Grandmother      No Medical Problems Paternal Grandfather      Anesthesia problems Neg Hx      Cancer Neg Hx      Clotting disorder Neg Hx      Diabetes Neg Hx      Dislocations Neg Hx      Osteoporosis Neg Hx      Psoriasis Neg Hx      Rashes / Skin problems Neg Hx      Severe sprains Neg Hx      Ulcerative colitis Neg Hx         Social History:    reports that he has been smoking cigars. He has quit using smokeless tobacco.  His smokeless tobacco use included chew. He reports current alcohol use. He reports that he does not use drugs.    Review of Systems:   General: WNL  Eyes: WNL  Ears/Nose/Throat: Hearing Loss  Lungs: Snoring  Heart: Shortness of Breath with activity  Stomach: WNL  Bladder: Frequent Urination at Night  Muscle/Joints: WNL  Skin: Rash  Nervous System: WNL  Mental Health: Anxiety     Blood: WNL    Meds:     Current Outpatient Medications:      acetaminophen (TYLENOL) 325 MG tablet, Take 2 tablets (650 mg total) by mouth every 4 (four) hours as needed., Disp: , Rfl: 0     ascorbic acid, vitamin C, (VITAMIN C) 1000 MG tablet, Take 1,000 mg by mouth daily., Disp: , Rfl:      aspirin 81 MG EC tablet, Take 81 mg by mouth at bedtime., Disp: , Rfl:      atorvastatin (LIPITOR) 80 MG tablet, TAKE 1 TABLET BY MOUTH ONCE DAILY AT BEDTIME, Disp: 90 tablet, Rfl: 2     betamethasone, augmented, (DIPROLENE-AF) 0.05 % cream, Apply 1 application topically as needed.    , Disp: , Rfl:      calcium carbonate-vitamin D3 (CALCIUM 600 + D,3,) 600  mg(1,500mg) -400 unit per tablet, Take 1 tablet by mouth daily., Disp: , Rfl:      carboxymethylcellulose (REFRESH PLUS) 0.5 % Dpet ophthalmic dropperette, Administer 1 drop to both eyes as needed. , Disp: , Rfl:      chlorthalidone (HYGROTEN) 25 MG tablet, Take 25 mg by mouth at bedtime., Disp: , Rfl:      glucosamine-chondroitin (OSTEO BI-FLEX) 250-200 mg Tab, Take 2 tablets by mouth daily.    , Disp: , Rfl:      hydrocortisone valerate (WEST-SAVANAH) 0.2 % ointment, Apply 1 application topically as needed (uses on hands and ankle).    , Disp: , Rfl:      ketoconazole (NIZORAL) 2 % cream, Apply 1 application topically as needed. Apply a thin layer to affected area(s) twice daily as needed  For athletes foot   , Disp: , Rfl:      krill-om-3-dha-epa-phospho-ast (MEGARED OMEGA-3 KRILL OIL) 815-360-80-64 mg cap, Take 1 capsule by mouth daily., Disp: , Rfl:      metoprolol tartrate (LOPRESSOR) 50 MG tablet, Take 0.5 tablets (25 mg total) by mouth 2 (two) times a day., Disp: 90 tablet, Rfl: 3     multivitamin therapeutic tablet, Take 1 tablet by mouth daily., Disp: , Rfl:      omeprazole (PRILOSEC) 20 MG capsule, Take 20 mg by mouth 2 (two) times a day.    , Disp: , Rfl:      s-adenosylmethionine (CECY-E) 400 mg Tab, Take 1 tablet by mouth daily., Disp: , Rfl:      vitamin E 400 unit capsule, Take 400 Units by mouth daily., Disp: , Rfl:       Allergies:   Budesonide, Sulfa (sulfonamide antibiotics), and Thimerosal      Objective:      Physical Exam  (!) 235 lb (105.2 kg)  6' (1.829 m)  Body mass index is 31.46 kg/m .  /70 (Patient Site: Left Arm, Patient Position: Sitting, Cuff Size: Adult Large)   Pulse 96   Resp 20   Ht 6' (1.829 m)   Wt (!) 235 lb   BMI 31.46 kg/m      General Appearance:   Awake, Alert, No acute distress.   HEENT:  Pupil equal and reactive to light. No scleral icterus; the mucous membranes were moist.   Neck: No cervical bruits. No JVD. No thyromegaly.     Chest: The spine was straight. The  chest was symmetric.   Lungs:   Respirations unlabored; Lungs are clear to auscultation. No crackles. No wheezing.   Cardiovascular:   Irrgular rhythm and rate, normal first and second heart sounds with no murmurs. No rubs or gallops.    Abdomen:  Soft. No tenderness. Non-distended. Bowels sounds are present   Extremities: Equal tibial pulses. No leg edema.   Skin: No rashes or ulcers. Warm, Dry.   Musculoskeletal: No tenderness. No deformity.   Neurologic: Mood and affect are appropriate. No focal deficits.         EKG: Personally reviewed  Sinus rhythm with Fusion complexes   Right bundle branch block   Abnormal ECG   When compared with ECG of 30-APR-2019 08:56,   Sinus rhythm has replaced Atrial fibrillation   Right bundle branch block is now Present   QT has lengthened     Cardiac Imaging Studies  ECHO on 4-:    No previous study for comparison.    Left ventricle ejection fraction is normal. The estimated left ventricular ejection fraction is 55%.    Normal left ventricular size.    Normal right ventricular size and systolic function.    Mild mitral regurgitation     Coronary angiogram on 4-:  Angiography via left radial  LM normal  LAD mid 100% with collaterals via diagonal; diagonal with 90% lesion  Circ large mild dz  RCA 70% distal RCA segment     CT pulmonary vein with contrast to evaluate JUDY on 6-:  1.  Left atrial appendage was ligated, but left a pouch 1.1 cm in depth and a 5 mm unsealed tunnel in inferior of left atrial appendage.   No evidence of JUDY thrombus formation.  2.  Moderately enlarged both atria.  3.  Normal left and right ventricular size and function.  4.  Diffuse coronary artery disease.  Mild disease in LM, occluded mid LAD and severe disease in D1.  Patent LIMA to distal LAD.  SVG to D1 is patent, but not able to see well after anastomosis, cannot exclude the occlusion.  Occluded mid RCA.  SVG to PDA is patent, PDA is a small vessel.     JUDY CT Scan on  10-:  1.  Left atrial appendage was ligated, but left a pouch 1.0 cm in depth and a 4.3 x 6 mm (0.22 cm  in area) unsealed tunnel in inferior side of left atrial appendage.   No evidence of JUDY thrombus formation.  2.  Moderately enlarged left atrium.  3.  Normal left and right ventricular size and function.  4.  Diffuse coronary artery disease.  Mild disease in LM, occluded mid LAD and severe disease in diagonal branches.  Patent LIMA to distal LAD, patent SVG to D. Mild disease in LCX.  Occluded mid RCA.  Patent SVG to PDA.   When compared to previous study on 6-, there is no significant interval change.    CT pulmonary vein study on 2-:  Reviewed images and findings are similar to previous study.    Lab Review   Lab Results   Component Value Date     (L) 05/05/2019    K 3.6 05/06/2019    CL 95 (L) 05/05/2019    CO2 25 05/05/2019    BUN 23 05/05/2019    CREATININE 1.04 05/05/2019    CALCIUM 9.5 05/05/2019     Lab Results   Component Value Date    WBC 9.6 05/02/2019    HGB 10.8 (L) 05/02/2019    HCT 33.4 (L) 05/02/2019    MCV 94 05/02/2019     05/05/2019     Lab Results   Component Value Date    CHOL 135 04/22/2019     Lab Results   Component Value Date    HDL 42 04/22/2019     Lab Results   Component Value Date    LDLCALC 78 04/22/2019     Lab Results   Component Value Date    TRIG 74 04/22/2019

## 2021-11-29 ENCOUNTER — HOSPITAL ENCOUNTER (OUTPATIENT)
Dept: CT IMAGING | Facility: CLINIC | Age: 76
Discharge: HOME | End: 2021-11-29
Attending: FAMILY MEDICINE | Admitting: FAMILY MEDICINE
Payer: COMMERCIAL

## 2021-11-29 DIAGNOSIS — S06.5XAA SUBDURAL HEMATOMA (H): ICD-10-CM

## 2021-11-29 PROCEDURE — 70450 CT HEAD/BRAIN W/O DYE: CPT

## 2021-11-29 NOTE — RESULT ENCOUNTER NOTE
Mehrdad  I rec'd your CT report already, and it's unchanged, thankfully.  So, we should continue the Eliquis.  You should have an appointment with a neurosurgeon in early December, and they will give us guidance on the timing of your next CT.  Please let me know if you have questions.  PAOLA Ponce

## 2021-12-06 ENCOUNTER — TRANSFERRED RECORDS (OUTPATIENT)
Dept: HEALTH INFORMATION MANAGEMENT | Facility: CLINIC | Age: 76
End: 2021-12-06

## 2021-12-10 ENCOUNTER — TELEPHONE (OUTPATIENT)
Dept: FAMILY MEDICINE | Facility: CLINIC | Age: 76
End: 2021-12-10

## 2022-02-04 ENCOUNTER — TELEPHONE (OUTPATIENT)
Dept: FAMILY MEDICINE | Facility: CLINIC | Age: 77
End: 2022-02-04
Payer: COMMERCIAL

## 2022-02-04 NOTE — TELEPHONE ENCOUNTER
[facility] phone call message- general phone call:    Reason for call: He needs a callback re he forgot to take all of his medication last night he is wondering what he should do.    Action desired: call back.    Return call needed: Yes    OK to leave a message on voice mail? Yes    Advised patient to response may take up to 2 business days: Yes    Primary language: English      needed? No    Call taken on February 4, 2022 at 8:10 AM by Alessia Hearn

## 2022-02-04 NOTE — TELEPHONE ENCOUNTER
Giacomo went to bed early last night and forgot to take his evening pills. He is most concerned about his statin and Chlorthalidone as he only takes these once a day. Instructed him to resume taking as normal tonight. No further questions. ./LR

## 2022-02-11 ENCOUNTER — OFFICE VISIT (OUTPATIENT)
Dept: FAMILY MEDICINE | Facility: CLINIC | Age: 77
End: 2022-02-11
Payer: COMMERCIAL

## 2022-02-11 VITALS
TEMPERATURE: 97.7 F | BODY MASS INDEX: 30.9 KG/M2 | OXYGEN SATURATION: 97 % | WEIGHT: 231 LBS | HEART RATE: 84 BPM | RESPIRATION RATE: 16 BRPM | SYSTOLIC BLOOD PRESSURE: 143 MMHG | DIASTOLIC BLOOD PRESSURE: 74 MMHG

## 2022-02-11 DIAGNOSIS — S06.5XAA SUBDURAL HEMATOMA (H): ICD-10-CM

## 2022-02-11 DIAGNOSIS — Z00.00 ENCOUNTER FOR MEDICARE ANNUAL WELLNESS EXAM: Primary | ICD-10-CM

## 2022-02-11 DIAGNOSIS — I48.11 LONGSTANDING PERSISTENT ATRIAL FIBRILLATION (H): ICD-10-CM

## 2022-02-11 DIAGNOSIS — I10 PRIMARY HYPERTENSION: ICD-10-CM

## 2022-02-11 DIAGNOSIS — I10 BENIGN ESSENTIAL HYPERTENSION: ICD-10-CM

## 2022-02-11 DIAGNOSIS — H34.12 CENTRAL RETINAL ARTERY OCCLUSION OF LEFT EYE: ICD-10-CM

## 2022-02-11 DIAGNOSIS — I25.798 CORONARY ARTERY DISEASE OF OTHER BYPASS GRAFT WITH STABLE ANGINA PECTORIS (H): ICD-10-CM

## 2022-02-11 PROCEDURE — G0439 PPPS, SUBSEQ VISIT: HCPCS | Performed by: FAMILY MEDICINE

## 2022-02-11 RX ORDER — CHLORTHALIDONE 25 MG/1
25 TABLET ORAL AT BEDTIME
Qty: 90 TABLET | Refills: 3 | Status: SHIPPED | OUTPATIENT
Start: 2022-02-11 | End: 2023-01-30

## 2022-02-11 RX ORDER — METOPROLOL SUCCINATE 100 MG/1
100 TABLET, EXTENDED RELEASE ORAL AT BEDTIME
Qty: 90 TABLET | Refills: 3 | Status: SHIPPED | OUTPATIENT
Start: 2022-02-11 | End: 2023-01-30

## 2022-02-11 NOTE — PROGRESS NOTES
Medicare Wellness Visit  Health Risk Assessment        Visual Acuity:  Sees bianka doctor         FALL RISK ASSESSMENT 2/11/2022 10/21/2021 4/12/2018   Fallen 2 or more times in the past year? No No No   Any fall with injury in the past year? No No No            Health Risk Assessment / Review of Systems     Constitutional: Any fevers or night sweats? No     Eyes:  Vision problems    YES     Hearing Do you feel you have hearing loss?  No     Cardiovascular: Any chest pain, fast or irregular heart beat, calf pain with walking?      YES Afib          Respiratory:   Any breathing problems or cough?   No     Gastrointestinal: Any stomach or stool problems?   No      Genitourinary: Do you have difficulty controlling urination?   No     Muscles and Joints: Any joint stiffness or soreness?   No     Skin: Any concerning lesions or moles?   No     Nervous System: Any loss of strength or feeling, numbness or tingling, shaking, dizziness, or headache?  No     Mental Health: Any depression, anxiety or problems sleeping?    No     Cognition: Do you have any problems with your memory?  No     PHQ-2 Score:   PHQ-2 ( 1999 Pfizer) 2/11/2022 11/18/2021   Q1: Little interest or pleasure in doing things 0 0   Q2: Feeling down, depressed or hopeless 0 0   PHQ-2 Score 0 0   PHQ-2 Total Score (12-17 Years)- Positive if 3 or more points; Administer PHQ-A if positive - 0       PHQ-9 Score:   No flowsheet data found.         Medical Care     What other specialists or organizations are involved in your medical care?  Dr. Vee  Patient Care Team       Relationship Specialty Notifications Start End    Giovani Ponce MD PCP - General Family Practice  1/31/13     Phone: [phone] Fax: [phone]         [address] [place] [address]    Antonina Reyes, PharmD Pharmacist Pharmacist  10/9/19     Phone: [phone] Fax: [phone]         [facility]  [address] [place] [address]    Solomon Vee MD Assigned  Heart and Vascular Provider   21     Phone: [phone] Fax: [phone]         [address] [place] [id number]    Giovani Ponce MD Assigned PCP   21     Phone: [phone] Fax: [phone]         [address] [place] [address]                 Social History / Home Safety     Social History     Tobacco Use     Smoking status: Former Smoker     Packs/day: 0.00     Types: Cigars     Quit date: 2021     Years since quittin.0     Smokeless tobacco: Former User     Types: Chew     Tobacco comment: 1 cigar per day   Substance Use Topics     Alcohol use: Yes     Comment: Alcoholic Drinks/day: rarely     Marital Status:  Who lives in your household?     Does your home have any of the following safety concerns? Loose rugs in the hallway, no grab bars in the bathroom, no handrails on the stairs or have poorly lit areas?  No     Do you feel threatened or controlled by a partner, ex-partner or anyone in your life? No     Has anyone hurt you physically, for example by pushing, hitting, slapping or kicking you   or forcing you to have sex? No          Functional Status     Do you need help with dressing yourself, bathing, or walking?No     Do you need help with the phone, transportation, shopping, preparing meals, housework, laundry, medications or managing money?No       Risk Behaviors and Healthy Habits     History   Smoking Status     Former Smoker     Packs/day: 0.00     Types: Cigars     Quit date: 2021   Smokeless Tobacco     Former User     Types: Chew     Comment: 1 cigar per day     How many servings of fruits and vegetables do you eat a day? 1    Exercise: 6-7 days/week for an average of 1 hour minutes walking, soft ball, weight lifting       Do you frequently drive without a seatbelt? No     Do you use any other drugs? No         Do you use alcohol?No      Frailty Assessment            1. By yourself and note using aids, do you have difficulty walking up 10  "steps without resting?  No  (1 for Yes, 0 for No)    2. By yourself and not using mobility aids, do you have any difficulty walking several hundred yards? No  (1 for Yes, 0 for No)    3. Have you lost 10 or more pounds unintentionally in the previous year? No  (If \"Yes\" and >5% weight loss, then score 1.  Score 0, if <5% weight loss or \"No\" weight loss)    4. How much of the times during the past 3 weeks did you feel tired? 4. A little of the time (\"1\" or \"2\" are scored 1, others 0)    5.  A doctor told the patient they had the following illnesses:   (0-4 = score 0, 5-11= score 1)        Katherine Nascimento CMA    "

## 2022-02-11 NOTE — PATIENT INSTRUCTIONS
"Changing to metoprolol succinate 100 mg  Watch out for  Pulse <50, worsening dizziness or fatigue.    Recombinant Zoster (Shingles) Vaccine, RZV:  \"Shingrix\"  What You Need to Know    Why get vaccinated?  Shingles (also called herpes zoster, or just zoster) is a painful skin rash, often with blisters. Shingles is caused by the varicella zoster virus, the same virus that causes chickenpox. After you have chickenpox, the virus stays in your body and can cause shingles later in life.    You can t catch shingles from another person. However, a person who has never had chickenpox (or chickenpox vaccine) could get chickenpox from someone with shingles.      Who gets shingles?  Shingles is far more common in people 50 years of age and older than in younger people, and the risk increases with age. Shingles is most common after the age of 60. It is also more common in people whose immune system is weakened because of a disease such as cancer, or by drugs such as steroids or chemotherapy.    At least 1 million people a year in the [place] get shingles.    About 1 out of 3 people will get shingles in their lifetime.    How do I know if I have shingles?  A shingles rash usually appears on one side of the face or body and heals within 2 to 4 weeks. Its main symptom is pain, which can be severe.                 Other symptoms can include fever, headache, chills, and upset stomach. Very rarely, a shingles infection can lead to pneumonia, hearing problems, blindness, brain inflammation (encephalitis), or death.    Can shingles cause long term problems?  Severe pain can continue even long after the rash has cleared up. This long-lasting pain is called post-herpetic neuralgia (PHN).  This occurs between 1 in 3 and 1 in 10 people that get shingles, and is more common with age.      How long has Shingrix been available?  Recombinant shingles vaccine was approved by FDA in 2017 for the prevention of shingles.   How effective is " Shingrix?  Two studies have evaluated this question.    In one study of adults over 50, the vaccine reduced the risk of shingles by 97%.    In a second study of more than [address]01 adults 70 and older, the vaccine reduced the risk of shingles by 90%, and the risk of PHN by 89%.    Two doses, 2 to 6 months apart, are recommended for adults 50 and older.    This vaccine is also recommended for people who have already gotten the live shingles vaccine (Zostavax). There is no live virus in this vaccine.      Some people should not get this vaccine  Tell your vaccine provider if you:  Have any severe, life-threatening allergies. A person who has ever had a life-threatening allergic reaction after a dose of recombinant shingles vaccine, or has a severe allergy to any component of this vaccine, may be advised not to be vaccinated. Ask your health care provider if you want information about vaccine components.  Are pregnant or breastfeeding. There is not much information about use of recombinant shingles vaccine in pregnant or nursing women. Your healthcare provider might recommend delaying vaccination.  Are not feeling well. If you have a mild illness, such as a cold, you can probably get the vaccine today. If you are moderately or severely ill, you should probably wait until you recover. Your doctor can advise you.    What are the risks of the vaccine?  With any medicine, including vaccines, there is a chance of reactions.  After recombinant shingles vaccination, a person might experience:  Pain, redness, soreness, or swelling at the site of the injection  Headache, muscle aches, fever, shivering, fatigue  In clinical trials, most people got a sore arm with mild or moderate pain after vaccination, and some also had redness and swelling where they got the shot. Some people felt tired, had muscle pain, a headache, shivering, fever, stomach pain, or nausea.  About 1 out of 6 people who got recombinant zoster vaccine experienced  side effects that prevented them from doing regular activities.  Symptoms went away on their own in about 2 to 3 days. Side effects were more common in younger people.    You should still get the second dose of recombinant zoster vaccine even if you had one of these reactions after the first dose.    Other things that could happen after this vaccine:  People sometimes faint after medical procedures, including vaccination. Sitting or lying down for about 15 minutes can help prevent fainting and injuries caused by a fall. Tell your provider if you feel dizzy or have vision changes or ringing in the ears.  Some people get shoulder pain that can be more severe and longer-lasting than routine soreness that can follow injections. This happens very rarely.  Any medication can cause a severe allergic reaction. Such reactions to a vaccine are estimated at about 1 in a million doses, and would happen within a few minutes to a few hours after the vaccination.  As with any medicine, there is a very remote chance of a vaccine causing a serious injury or death.    The safety of vaccines is always being monitored.  For more information, visit: www.cdc.gov/vaccinesafety/.    What if there is a serious problem?  What should I look for?    Look for anything that concerns you, such as signs of a severe allergic reaction, very high fever, or unusual behavior.    Signs of a severe allergic reaction can include hives, swelling of the face and throat, difficulty breathing, a fast heartbeat, dizziness, and weakness. These would usually start a few minutes to a few hours after the vaccination.    What should I do?    If you think it is a severe allergic reaction or other emergency that can t wait, call 9-1-1 or get to the nearest hospital. Otherwise, call your health care provider.    Afterward, the reaction should be reported to the Vaccine Adverse Event Reporting System (VAERS). Your doctor should file this report, or you can do it  yourself through the [employer] website, or by calling 1-407.732.8094.    How can I learn more?  Ask your health care provider. He or she can give you the vaccine package insert or suggest other sources of information.  Contact your local or state health department.  Contact the Centers for Disease Control and Prevention (CDC):  Call 1-798.733.2552 (7-934-OCT-INFO) or  Visit CDC s vaccines website    How often is it covered?  The shingles shot isn t covered by Medicare Part A (Hospital Insurance) or Medicare Part B (Medical Insurance). Generally, Medicare prescription drug plans (Part D) cover all commercially-available vaccines (like the shingles shot) needed to prevent illness. Contact your Medicare drug plan for more information about coverage.    Current cost of the 2 shot vaccine is approximately $280.

## 2022-02-12 NOTE — PROGRESS NOTES
Medicare Annual Wellness Visit         HPI     This 76 year old male presents as an established patient  Giovani Ponce who presents for an subsequent Medicare Wellness Exam.  Patient also reports concern about his BP which has been 130-140s over 80-90s.  No sx         Patient Active Problem List   Diagnosis     Atrial fibrillation (H)     Basal cell carcinoma of skin     Esophageal reflux     Hyperlipidemia     Health Care Home     Obstructive sleep apnea     Spermatocele     Impaired fasting glucose     Abnormal abdominal CT scan     BPH (benign prostatic hyperplasia)     History of colonic polyps     Status post coronary artery bypass graft     Coronary artery disease of other bypass graft with stable angina pectoris (H)     Central retinal artery occlusion of left eye     Primary hypertension       Past Medical History:   Diagnosis Date     Atrial fibrillation (H)      BPH (benign prostatic hyperplasia)      CAD (coronary artery disease)      Cancer (H)      Cancer (H)     basal cell carcinoma of skin     Coronary artery disease of other bypass graft with stable angina pectoris (H) 11/18/2021     Coronary atherosclerosis      Diverticulosis      GERD (gastroesophageal reflux disease)      H/O cardiovascular stress test     negative 2012     Hyperlipemia      Impaired fasting glucose      Low back pain      Primary hypertension 11/24/2021     Sleep apnea     unable to tolerate CPAP     Spermatocele         Family History   Problem Relation Age of Onset     Hypertension Mother      Coronary Artery Disease Mother      Unknown/Adopted Sister      Diabetes Father      Gastrointestinal Disease Brother         pancreatic cancer in late 80s     Cancer No family hx of      Hyperlipidemia No family hx of      Cerebrovascular Disease No family hx of      Breast Cancer No family hx of      Colon Cancer No family hx of      Prostate Cancer No family hx of      Other Cancer No family hx of      Depression No family hx  of      Anxiety Disorder No family hx of      Mental Illness No family hx of      Substance Abuse No family hx of      Anesthesia Reaction No family hx of      Asthma No family hx of      Osteoporosis No family hx of      Genetic Disorder No family hx of      Thyroid Disease No family hx of      Obesity No family hx of      Hypertension Father      No Known Problems Mother      No Known Problems Sister      No Known Problems Brother      No Known Problems Maternal Aunt      No Known Problems Maternal Uncle      No Known Problems Paternal Aunt      No Known Problems Paternal Uncle      No Known Problems Maternal Grandmother      No Known Problems Maternal Grandfather      No Known Problems Paternal Grandmother      No Known Problems Paternal Grandfather      Clotting Disorder No family hx of      Diabetes No family hx of      Dislocations No family hx of      Psoriasis No family hx of      Rashes/Skin Problems No family hx of      Severe sprains No family hx of      Ulcerative Colitis No family hx of          Past Surgical History:   Procedure Laterality Date     CV CORONARY ANGIOGRAM N/A 4/22/2019    Procedure: Coronary Angiogram;  Surgeon: Qamar Martin MD;  Location: [facility] Cath Lab;  Service: Cardiology     LAPAROSCOPIC HERNIORRHAPHY INGUINAL Bilateral 12/3/2015    Procedure: LAPAROSCOPIC BILATERAL INGUINAL HERNIA REPAIR WITH BILATERAL MESH;  Surgeon: Bran Puckett MD;  Location: [facility] OR;  Service:      SOFT TISSUE SURGERY      basal cell cancer       Reviewed no other significant FH    Family History and past Medical History reviewed and it is unchanged/updated.       Review of Systems       Constitutional:   fevers, night sweats or unintentional weight change ?  NO      Eyes:   vision change, diplopia or red eyes?  NO      Ears, Nose, Mouth, Throat:   tinnitus or hearing change,  epistaxis or nasal discharge,  oral lesions, throat pain ?  NO      Neck:   stiffness?  NO            Cardiovascular:   chest pain, palpitations, or pain with walking, orthopnea or PND?  NO   Breasts:  Any bumps or unusual discharge?     NO         Respiratory:   dyspnea, cough, shortness of breath or wheezing?  NO         GI:   nausea, vomiting, diarrhea or constipation,  abdominal pain ?  NO         :   change in urine,  dysuria or hematuria,  sexual dysfunction ?  NO        Musculoskeletal:   joint or muscle pain or swelling?  NO            Skin:   concerning lesions or moles?  NO           Nervous System:   loss of strength or sensation,  numbness or tingling,  tremor,  dizziness,  headache?  NO   Endocrine/Homone:   polyuria or polydipsia,  temperature intolerance?  NO            Blood and Lymphnodes:   concerning bumps,  bleeding problems?  NO            Allergy:   environmental allergies?  NO            Mental Health:   depression or anxiety,  sleep problems?  NO               Medical Care     Have you been to an ER or a hospital in the last year? Yes  What other specialists or organizations are involved in your medical care?  Dr Vee.  [facility]  Current providers sharing in care for this patient include:  Patient Care Team:  Giovani Ponce MD as PCP - General (Family Practice)  Antonina Reyes PharmD as Pharmacist (Pharmacist)  Solomon Vee MD as Assigned Heart and Vascular Provider  Giovani Ponce MD as Assigned PCP         Social History     Social History     Tobacco Use     Smoking status: Former Smoker     Packs/day: 0.00     Types: Cigars     Quit date: 2021     Years since quittin.0     Smokeless tobacco: Former User     Types: Chew     Tobacco comment: 1 cigar per day   Substance Use Topics     Alcohol use: Yes     Comment: Alcoholic Drinks/day: rarely     Marital Status:  Who lives in your household? wife  Does your home have any of the following safety concerns? Loose rugs in the hallway, no grab bars in the bathroom, no handrails on the stairs or have poorly lit  areas?  No  Do you feel threatened or controlled by a partner, ex-partner or anyone in your life? No  Has anyone hurt you physically, for example by pushing, hitting, slapping or kicking you   or forcing you to have sex? No  Do you need help with the phone, transportation, shopping, preparing meals, housework, laundry, medications or managing money? No   Have you noticed any hearing difficulties? No      Risk Behaviors and Healthy Habits     How many servings of fruits and vegetables do you eat a day? Not asked  How often do you exercise and what do you do? Elliptical machine/wts/softball   Do you frequently ride without a seatbelt? No  Do you use tobacco?  No  Do you use any other drugs? No         Do you use alcohol?No  Yes  Number of drinks per day   Number of drinking days a week     Today's PHQ-2 Score: 0    Sexual Health     Are you sexually active?  Yes partner   If yes, with men, women, or both? Female  If yes, do you more than one current partner?No  If yes, are you using condoms?  No  Have you had any sexually transmitted infections? No   Any sexual concerns? No         FUNCTIONAL ABILITY/SAFETY SCREENING     Fall Risk Assessment Today: Fallen 2 or more times in the past year?: No  Any fall with injury in the past year?: No    Hearing evaluation if done: No    EVALUATION OF COGNITIVE FUNCTION     Mood/affect:Normal  Appearance:Normal  Family member/caregiver input: Normal    Mini Cog Scoring   Not done points   Clock Draw Test result: not done.  Pt is cogntively normal. He is reading.  He is an attentive historian      SCREENING FOR PREVENTION and EARLY DETECTION     ECG (if done)    Corrected Visual acuity: NA  CV Risk based on Pooled Cohort Risk:  Advanced Directives: Discussed and patient desires to be full code.      Immunization History   Administered Date(s) Administered     COVID-19,PF,Pfizer (12+ Yrs) 01/30/2021, 02/20/2021, 10/21/2021     Historical DTP/aP 01/01/1995, 05/28/2004     Influenza  (High Dose) 3 valent vaccine 10/18/2017, 10/15/2018, 10/14/2019     Influenza (IIV3) PF 09/21/2012, 09/30/2013     Influenza, Quad, High Dose, Pf, 65yr+ (Fluzone HD) 09/21/2020, 10/21/2021     Pneumo Conj 13-V (2010&after) 12/06/2016     Pneumococcal 23 valent 08/28/2008     TDAP Vaccine (Boostrix) 01/28/2014       Reviewed Immunization Record Today  Pneumoccocal Vaccine: No  Varicella Vaccine: suggested  TDaP:No         Physical Exam     Vitals: BP (!) 143/74 (BP Location: Left arm, Patient Position: Sitting, Cuff Size: Adult Regular)   Pulse 84   Temp 97.7  F (36.5  C) (Oral)   Resp 16   Wt 104.8 kg (231 lb)   SpO2 97%   BMI 30.90 kg/m    BMI= Body mass index is 30.9 kg/m .  GENERAL APPEARANCE: healthy, alert and no distress  EYES: Eyes grossly normal to inspection, PERRL and conjunctivae and sclerae normal  RESP: lungs clear to auscultation - no rales, rhonchi or wheezes  CV: normal S1 S2, no S3 or S4, no murmur, click or rub, no peripheral edema, peripheral pulses strong and irregularly irregular rhythm  ABDOMEN: soft, nontender, no hepatosplenomegaly, no masses and bowel sounds normal  SKIN: no suspicious lesions or rashes  NEURO: Normal strength and tone, sensory exam grossly normal, mentation intact and speech normal  PSYCH: mentation appears normal and affect normal/bright        Assessment and Plan   subsequent   Medicare Wellness Exam    ASSESSMENT AND PLAN:  1.  For diagnostic testing, he is due for colonoscopy but prefers to defer this until after he has a Watchman procedure, which is anticipated to be in April.  He will be off anticoagulants then later this summer and can proceed to have colonoscopy.  I have sent myself a reminder to order that colonoscopy for him at the end of July.  He agrees with this plan.  2.  He agrees to get the shingles vaccine, and I gave him information in his AVS about this.  3.  Regarding his hypertension, we will increase his metoprolol from tartrate 25 b.i.d. to  succinate 100 mg at bedtime.  He will follow up with home blood pressure and pulse readings in the next month.  4.  Regarding his hypertension, we changed the metoprolol as above and will continue chlorthalidone.  He had a BMP in the fall that was normal.  5.  He had a subdural hematoma.  He is on anticoagulation.  The plan was to repeat a head CT at some point in December or January.  We are beyond that and we will get a CT now.   6.  Regarding his atrial fibrillation, he is rate controlled and anticoagulated and then planning to have a procedure in April through Cardiology.  7.  Regarding his hyperlipidemia, his lipids were at goal in October.        Giacomo was seen today for wellness visit.    Diagnoses and all orders for this visit:    Primary hypertension  -     metoprolol succinate ER (TOPROL-XL) 100 MG 24 hr tablet; Take 1 tablet (100 mg) by mouth At Bedtime    Coronary artery disease of other bypass graft with stable angina pectoris (H)  -     metoprolol succinate ER (TOPROL-XL) 100 MG 24 hr tablet; Take 1 tablet (100 mg) by mouth At Bedtime    Benign essential hypertension  -     chlorthalidone (HYGROTON) 25 MG tablet; Take 1 tablet (25 mg) by mouth At Bedtime    Subdural hematoma (H)  -     Cancel: CT HEAD W/O CONTRAST; Future  -     CT HEAD W/O CONTRAST; Future    Longstanding persistent atrial fibrillation (H)    Encounter for Medicare annual wellness exam          Options for treatment and follow-up care were reviewed with the Giacomo Quezadal and/or guardian engaged in the decision making process and verbalized understanding of the options discussed and agreed with the final plan.    SOPHIE BERNSTEIN

## 2022-02-22 ENCOUNTER — HOSPITAL ENCOUNTER (OUTPATIENT)
Dept: CT IMAGING | Facility: CLINIC | Age: 77
Discharge: HOME | End: 2022-02-22
Attending: FAMILY MEDICINE | Admitting: FAMILY MEDICINE
Payer: COMMERCIAL

## 2022-02-22 DIAGNOSIS — S06.5XAA SUBDURAL HEMATOMA (H): ICD-10-CM

## 2022-02-22 PROCEDURE — 70450 CT HEAD/BRAIN W/O DYE: CPT

## 2022-03-01 NOTE — RESULT ENCOUNTER NOTE
Mehrdad  The CT done last week shows that the blood clot on the surface of the brain (subdural hematoma) is decreasing in size, thankfully.  A small percent of subdurals recur over time, and we'll repeat this in May, 2022.  Take care.  PAOLA Ponce

## 2022-03-02 ENCOUNTER — TELEPHONE (OUTPATIENT)
Facility: CLINIC | Age: 77
End: 2022-03-02
Payer: COMMERCIAL

## 2022-03-02 DIAGNOSIS — I48.91 ATRIAL FIBRILLATION (H): Primary | ICD-10-CM

## 2022-03-02 DIAGNOSIS — Z98.890 S/P LEFT ATRIAL APPENDAGE LIGATION: ICD-10-CM

## 2022-03-02 NOTE — TELEPHONE ENCOUNTER
[facility] Call Center    Phone Message    May a detailed message be left on voicemail: yes     Reason for Call: Other: Dr. Vee, please call pt to discuss what appt needs to be made to fix the hole in his heart.     Action Taken: Message routed to:  [facility] cardio    Travel Screening: Not Applicable

## 2022-03-03 NOTE — TELEPHONE ENCOUNTER
Pt had JUDY ligated during bypass surgery.  Pulmonary CT reported left atrial appendage was ligated with residual around 5 mm sized communication between LA and JUDY.      Pt does not want to be on Eliquis any longer and is asking to get hole closed.    Pt is scheduled with Dr Vee 4/11/22.    Dr Vee - do you want any testing done prior to appt with you?  -[facility]

## 2022-03-03 NOTE — TELEPHONE ENCOUNTER
Recommendations discussed with pt.  Pt verbalized understanding and had no questions.  CT Pulm Vein ordered.  -rah    ===View-only below this line===  ----- Message -----  From: Solomon Vee MD  Sent: 3/3/2022   2:27 PM CST  To: Gerri Craig RN    Please order a cardiac CT JUDY study.  If it shows JUDY closed now, stop anticoagulation.  If it is similar to previous study, I will talk to interventional cardiologist and then close it.  Thanks  Zack

## 2022-03-07 ENCOUNTER — TELEPHONE (OUTPATIENT)
Dept: FAMILY MEDICINE | Facility: CLINIC | Age: 77
End: 2022-03-07
Payer: COMMERCIAL

## 2022-03-07 NOTE — TELEPHONE ENCOUNTER
Spoke with Giacomo's wife as he was at softball at the time of return call. She notes his Bps have been in the 140s. He measures twice a day. He takes some medication in the morning and some in the evening.     Requested that when he get home they mychart us the last week's worth of blood pressures. She will call if they have trouble logging in. Routed to Dr. Ponce. ./DMITRY

## 2022-03-07 NOTE — TELEPHONE ENCOUNTER
[facility] phone call message- general phone call:    Reason for call: you ()gave him some medication for his BP but it has not gone down in two weeks.    Action desired: call back.    Return call needed: Yes    OK to leave a message on voice mail? Yes    Advised patient to response may take up to 2 business days: Yes    Primary language: English      needed? No    Call taken on March 7, 2022 at 11:35 AM by Alessia Hearn

## 2022-03-10 NOTE — TELEPHONE ENCOUNTER
I spoke with kristyn.  Will monitor BP and pulse over next week and ask them to check in with us again then.  If consistently above 140 and pulse in 60s, will add lisinopril 5 mg daily.  PAOLA Ponce

## 2022-03-11 ENCOUNTER — HOSPITAL ENCOUNTER (OUTPATIENT)
Dept: CT IMAGING | Facility: CLINIC | Age: 77
Discharge: HOME | End: 2022-03-11
Attending: INTERNAL MEDICINE | Admitting: INTERNAL MEDICINE
Payer: COMMERCIAL

## 2022-03-11 DIAGNOSIS — I48.91 ATRIAL FIBRILLATION (H): ICD-10-CM

## 2022-03-11 DIAGNOSIS — Z98.890 S/P LEFT ATRIAL APPENDAGE LIGATION: ICD-10-CM

## 2022-03-11 LAB
BSA FOR ECHO PROCEDURE: 2.27 M2
CCTA ASCENDING AORTA: 3.5
CCTA SINUS: 3.7
CREAT BLD-MCNC: 1.2 MG/DL (ref 0.7–1.3)
GFR SERPL CREATININE-BSD FRML MDRD: >60 ML/MIN/1.73M2

## 2022-03-11 PROCEDURE — 75572 CT HRT W/3D IMAGE: CPT | Mod: 26 | Performed by: GENERAL ACUTE CARE HOSPITAL

## 2022-03-11 PROCEDURE — 75572 CT HRT W/3D IMAGE: CPT

## 2022-03-11 PROCEDURE — 250N000011 HC RX IP 250 OP 636: Performed by: INTERNAL MEDICINE

## 2022-03-11 PROCEDURE — 82565 ASSAY OF CREATININE: CPT

## 2022-03-11 RX ORDER — IOPAMIDOL 755 MG/ML
100 INJECTION, SOLUTION INTRAVASCULAR ONCE
Status: COMPLETED | OUTPATIENT
Start: 2022-03-11 | End: 2022-03-11

## 2022-03-11 RX ADMIN — IOPAMIDOL 100 ML: 755 INJECTION, SOLUTION INTRAVENOUS at 08:56

## 2022-03-16 NOTE — PROGRESS NOTES
Solomon Vee MD   3/15/2022  5:05 PM CDT Back to Top        Please let the patient know that I have reviewed his CT left atrial appendage report.  Based on parental report, there is no communications between left atrium and left atrial appendage.  But there is a small residual pouch.    There is a relatively low risk of thrombus formation.  He probably can stop anticoagulation.  Thanks  Zack     === Eliquis discontinued.  -[facility]

## 2022-03-21 ENCOUNTER — TELEPHONE (OUTPATIENT)
Dept: FAMILY MEDICINE | Facility: CLINIC | Age: 77
End: 2022-03-21
Payer: COMMERCIAL

## 2022-03-21 NOTE — TELEPHONE ENCOUNTER
Patient has been prescribed a 4 week coarse of  terbinifine 25mg BID for a rash on his feet.  The prescriber wants pt to be off his statin for this time period and wants the approval from his PCP to be off the statin    B/p readings   3/8     139/82  p 61         129/73    85  3/9     142/91     68         144/95    63  3/10   129/89     81         130/112  80  3/11   131/80     79         134/86    80  3/12   132/83     66         128/79    89  3/13   125/76     56         140/80    92  3/14   123/77     80         129/83    81     3/15   129/80     57         122/84    81  3/16   127/77     72         121/76    78  3/17   133/79     60         153/76    61  3/18    124/82    65  3/19    129/78    68  3/20    125/77    64    Patient states he is tolerating the meds without problems    Note routed to Dr. Ponce  /CECILIA

## 2022-03-21 NOTE — TELEPHONE ENCOUNTER
[facility] phone call message- general phone call:    Reason for call: He would like to talk to  please give him a call after 3pm    Action desired: CALL BACK.    Return call needed: Yes    OK to leave a message on voice mail? Yes    Advised patient to response may take up to 2 business days: Yes    Primary language: English      needed? No    Call taken on March 21, 2022 at 9:11 AM by Alessia Hearn

## 2022-03-22 NOTE — TELEPHONE ENCOUNTER
Please call:  1.  I think his BP's are in a good place, and I'd like him to continue metoprolol succinate 100 mg daily.  2.  Although both atorvastatin and terbinafine can harm the liver, he has no history of liver problems (checked in 2019), there are no direct drug interactions between atorvastatin and terbinafine, and, if he is only taking the terbinafine for 4 weeks, I think the safer choice is to just stay on the atorvastatin while taking terbinafine.    If he has additional questions, I'm happy to talk with him about it.  PAOLA Ponce

## 2022-03-22 NOTE — TELEPHONE ENCOUNTER
"Requested information given to pt who voices understanding and able to repeat med instructions back accurately    Patient reporting he no longer needs the heart procedure that was planned for April the \"hole closed on its own\".    Note routed to   /BTRIRVIN        "

## 2022-03-24 ENCOUNTER — TELEPHONE (OUTPATIENT)
Dept: FAMILY MEDICINE | Facility: CLINIC | Age: 77
End: 2022-03-24
Payer: COMMERCIAL

## 2022-03-24 NOTE — TELEPHONE ENCOUNTER
[facility] phone call message- medication clarification/question:    Full Medication Name:     TERBINAFAINE    Question:     Pt has some questions about his medication and would like to talk to Dr. Ponce about it before taking it.     Pharmacy confirmed as    [id number] - [place], [place] - [address] [facility] - [place], [place] - [address]: Yes    OK to leave a message on voice mail? Yes    Primary language: English      needed? No    Call taken on March 24, 2022 at 12:50 PM by Miladis Chi

## 2022-04-08 ENCOUNTER — TELEPHONE (OUTPATIENT)
Dept: CARDIOLOGY | Facility: CLINIC | Age: 77
End: 2022-04-08
Payer: COMMERCIAL

## 2022-04-08 NOTE — TELEPHONE ENCOUNTER
[facility] Call Center    Phone Message    May a detailed message be left on voicemail: yes     Reason for Call: Other: Patient called and spoke with writer, he states he tested postive for covid on 04/05 and would like to know if he can still come in on 04/11/22 please call patient to discuss further      Action Taken: Message routed to:  [facility] Cardio     Travel Screening: Not Applicable

## 2022-04-23 ENCOUNTER — TRANSFERRED RECORDS (OUTPATIENT)
Dept: HEALTH INFORMATION MANAGEMENT | Facility: CLINIC | Age: 77
End: 2022-04-23

## 2022-05-07 ENCOUNTER — TELEPHONE (OUTPATIENT)
Dept: FAMILY MEDICINE | Facility: CLINIC | Age: 77
End: 2022-05-07
Payer: COMMERCIAL

## 2022-05-07 DIAGNOSIS — S06.5XAA SUBDURAL HEMATOMA (H): Primary | ICD-10-CM

## 2022-05-07 NOTE — TELEPHONE ENCOUNTER
----- Message from Giovani Ponce MD sent at 3/1/2022  8:56 AM CST -----  Regarding: repeat head CT in May, 2022

## 2022-05-25 ENCOUNTER — TRANSFERRED RECORDS (OUTPATIENT)
Dept: HEALTH INFORMATION MANAGEMENT | Facility: CLINIC | Age: 77
End: 2022-05-25
Payer: COMMERCIAL

## 2022-05-26 DIAGNOSIS — K21.9 GASTROESOPHAGEAL REFLUX DISEASE WITHOUT ESOPHAGITIS: ICD-10-CM

## 2022-06-13 ENCOUNTER — OFFICE VISIT (OUTPATIENT)
Dept: CARDIOLOGY | Facility: CLINIC | Age: 77
End: 2022-06-13
Attending: INTERNAL MEDICINE
Payer: COMMERCIAL

## 2022-06-13 ENCOUNTER — TRANSFERRED RECORDS (OUTPATIENT)
Dept: HEALTH INFORMATION MANAGEMENT | Facility: CLINIC | Age: 77
End: 2022-06-13

## 2022-06-13 VITALS
RESPIRATION RATE: 16 BRPM | WEIGHT: 233 LBS | BODY MASS INDEX: 31.17 KG/M2 | SYSTOLIC BLOOD PRESSURE: 134 MMHG | DIASTOLIC BLOOD PRESSURE: 72 MMHG | HEART RATE: 64 BPM

## 2022-06-13 DIAGNOSIS — E78.2 MIXED HYPERLIPIDEMIA: ICD-10-CM

## 2022-06-13 DIAGNOSIS — I10 PRIMARY HYPERTENSION: ICD-10-CM

## 2022-06-13 DIAGNOSIS — I48.11 LONGSTANDING PERSISTENT ATRIAL FIBRILLATION (H): ICD-10-CM

## 2022-06-13 DIAGNOSIS — I25.798 CORONARY ARTERY DISEASE OF OTHER BYPASS GRAFT WITH STABLE ANGINA PECTORIS (H): Primary | ICD-10-CM

## 2022-06-13 PROCEDURE — 99214 OFFICE O/P EST MOD 30 MIN: CPT | Performed by: INTERNAL MEDICINE

## 2022-06-13 RX ORDER — ASPIRIN 81 MG/1
81 TABLET ORAL DAILY
COMMUNITY

## 2022-06-13 NOTE — PROGRESS NOTES
Progress Notes by Solomon Vee MD at 1/20/2021  7:50 AM     Author: Solomon Vee MD Service: -- Author Type: Physician    Filed: 1/20/2021  8:25 AM Encounter Date: 1/20/2021 Status: Signed    : Solomon Vee MD (Physician)           Click to link to [facility] HEART [facility] NOTE       Assessment/Plan:   1.  Coronary artery disease s/p 3 V CABG LIMA to LAD, SVG to D1, SVG to RCA on 5-1-2019: The patient had no chest pain or shortness of breath.  Continue aspirin, Lipitor, metoprolol.     2.  Persistent atrial fibrillation: The patient's heart rate has been controlled very well.  He had no palpitations and shortness of breath.    Continue metoprolol 25 mg twice a day.     His [id number] score is 5.  The patient really did not like to take a blood thinner chronically because he has been playing softball twice a week.  His CT left atrium appendage was reported small residual pouch which had no thrombus formation, low risk for thrombus formation.  His Eliquis was discontinued in March 2022.  Advised to take aspirin 81 mg daily.     3.  Essential hypertension: His blood pressure has been well controlled with metoprolol 25 mg twice a day.  The target of his blood pressure less than 130/80 mmHg.     4.  Hyperlipidemia.  Continue Lipitor 80 mg nightly.  LDL was well controlled, 64.    Thank you for the opportunity to be involved in the care of Giacomo PARIKH Jennie Thakur. If you have any questions, please feel free to contact me.  I will see the patient again in 12 months and as needed.    Much or all of the text in this note was generated through the use of Dragon Dictate voice-to-text software. Errors in spelling or words which seem out of context are unintentional.   Sound alike errors, in particular, may have escaped editing.       History of Present Illness:   It is my pleasure to see Giacomo Schneider Sr. at the [facility] clinic for routine cardiology follow-up.  Giacomo Schneider Sr. is a  77 y.o. male with a medical history of  persistent atrial fibrillation, hyperlipidemia, obstructive sleep apnea, CAD s/p 3 V CABG LIMA to LAD, SVG to D1 and SVG to RCA and JUDY ligation on 5-1-2019 .      The patient states that he has been doing fine since last visit.  He denies chest pain or chest discomfort.  He complains of chronic shortness of breath on exertion which has been stable.  He had no palpitations, shortness of breath, dizziness, orthopnea, PND, or leg swelling. His blood pressure and heart rate have been controlled well.   he has been playing softball twice a week.  He has been off [employer] in March 2022.    Past Medical History:     Patient Active Problem List   Diagnosis     Mixed hyperlipidemia     Atrial fibrillation (H)     Adult Sleep Apnea     Esophageal Reflux     Bilateral inguinal hernia     Essential hypertension     FONSECA (dyspnea on exertion)     Abnormal cardiovascular stress test     Abnormal computed tomography angiography (CTA)     Elevated coronary artery calcium score     S/P CABG (coronary artery bypass graft)     ARF (acute respiratory failure) (H)     Anemia due to blood loss, acute     Coronary artery disease involving coronary bypass graft of native heart without angina pectoris       Past Surgical History:     Past Surgical History:   Procedure Laterality Date     CV CORONARY ANGIOGRAM N/A 4/22/2019    Procedure: Coronary Angiogram;  Surgeon: Qamar Martin MD;  Location: [facility] Cath Lab;  Service: Cardiology     LAPAROSCOPIC INGUINAL HERNIA REPAIR Bilateral 12/3/2015    Procedure: LAPAROSCOPIC BILATERAL INGUINAL HERNIA REPAIR WITH BILATERAL MESH;  Surgeon: Bran Puckett MD;  Location: [facility];  Service:        Family History:     Family History   Problem Relation Age of Onset     Hypertension Father      No Medical Problems Mother      No Medical Problems Sister      No Medical Problems Brother      No Medical Problems Maternal Aunt      No Medical  Problems Maternal Uncle      No Medical Problems Paternal Aunt      No Medical Problems Paternal Uncle      No Medical Problems Maternal Grandmother      No Medical Problems Maternal Grandfather      No Medical Problems Paternal Grandmother      No Medical Problems Paternal Grandfather      Anesthesia problems Neg Hx      Cancer Neg Hx      Clotting disorder Neg Hx      Diabetes Neg Hx      Dislocations Neg Hx      Osteoporosis Neg Hx      Psoriasis Neg Hx      Rashes / Skin problems Neg Hx      Severe sprains Neg Hx      Ulcerative colitis Neg Hx         Social History:    reports that he has been smoking cigars. He has quit using smokeless tobacco.  His smokeless tobacco use included chew. He reports current alcohol use. He reports that he does not use drugs.    Review of Systems:   General: WNL  Eyes: WNL  Ears/Nose/Throat: Hearing Loss  Lungs: Snoring  Heart: Shortness of Breath with activity  Stomach: WNL  Bladder: Frequent Urination at Night  Muscle/Joints: WNL  Skin: Rash  Nervous System: WNL  Mental Health: Anxiety     Blood: WNL    Meds:     Current Outpatient Medications:      acetaminophen (TYLENOL) 325 MG tablet, Take 2 tablets (650 mg total) by mouth every 4 (four) hours as needed., Disp: , Rfl: 0     ascorbic acid, vitamin C, (VITAMIN C) 1000 MG tablet, Take 1,000 mg by mouth daily., Disp: , Rfl:      aspirin 81 MG EC tablet, Take 81 mg by mouth at bedtime., Disp: , Rfl:      atorvastatin (LIPITOR) 80 MG tablet, TAKE 1 TABLET BY MOUTH ONCE DAILY AT BEDTIME, Disp: 90 tablet, Rfl: 2     betamethasone, augmented, (DIPROLENE-AF) 0.05 % cream, Apply 1 application topically as needed.    , Disp: , Rfl:      calcium carbonate-vitamin D3 (CALCIUM 600 + D,3,) 600 mg(1,500mg) -400 unit per tablet, Take 1 tablet by mouth daily., Disp: , Rfl:      carboxymethylcellulose (REFRESH PLUS) 0.5 % Dpet ophthalmic dropperette, Administer 1 drop to both eyes as needed. , Disp: , Rfl:      chlorthalidone (HYGROTEN) 25 MG  tablet, Take 25 mg by mouth at bedtime., Disp: , Rfl:      glucosamine-chondroitin (OSTEO BI-FLEX) 250-200 mg Tab, Take 2 tablets by mouth daily.    , Disp: , Rfl:      hydrocortisone valerate (WEST-SAVANAH) 0.2 % ointment, Apply 1 application topically as needed (uses on hands and ankle).    , Disp: , Rfl:      ketoconazole (NIZORAL) 2 % cream, Apply 1 application topically as needed. Apply a thin layer to affected area(s) twice daily as needed  For athletes foot   , Disp: , Rfl:      krill-om-3-dha-epa-phospho-ast (MEGARED OMEGA-3 KRILL OIL) 454-521-07-64 mg cap, Take 1 capsule by mouth daily., Disp: , Rfl:      metoprolol tartrate (LOPRESSOR) 50 MG tablet, Take 0.5 tablets (25 mg total) by mouth 2 (two) times a day., Disp: 90 tablet, Rfl: 3     multivitamin therapeutic tablet, Take 1 tablet by mouth daily., Disp: , Rfl:      omeprazole (PRILOSEC) 20 MG capsule, Take 20 mg by mouth 2 (two) times a day.    , Disp: , Rfl:      s-adenosylmethionine (CECY-E) 400 mg Tab, Take 1 tablet by mouth daily., Disp: , Rfl:      vitamin E 400 unit capsule, Take 400 Units by mouth daily., Disp: , Rfl:       Allergies:   Budesonide, Sulfa (sulfonamide antibiotics), and Thimerosal      Objective:      Physical Exam  (!) 235 lb (105.2 kg)  6' (1.829 m)  Body mass index is 31.46 kg/m .  /70 (Patient Site: Left Arm, Patient Position: Sitting, Cuff Size: Adult Large)   Pulse 96   Resp 20   Ht 6' (1.829 m)   Wt (!) 235 lb   BMI 31.46 kg/m      General Appearance:   Awake, Alert, No acute distress.   HEENT:  Pupil equal and reactive to light. No scleral icterus; the mucous membranes were moist.   Neck: No cervical bruits. No JVD. No thyromegaly.     Chest: The spine was straight. The chest was symmetric.   Lungs:   Respirations unlabored; Lungs are clear to auscultation. No crackles. No wheezing.   Cardiovascular:   Irrgular rhythm and rate, normal first and second heart sounds with no murmurs. No rubs or gallops.    Abdomen:   Obese. Soft. No tenderness. Non-distended. Bowels sounds are present   Extremities: Equal tibial pulses. No leg edema.   Skin: No rashes or ulcers. Warm, Dry.   Musculoskeletal: No tenderness. No deformity.   Neurologic: Mood and affect are appropriate. No focal deficits.         EKG: Personally reviewed  Sinus rhythm with Fusion complexes   Right bundle branch block   Abnormal ECG   When compared with ECG of 30-APR-2019 08:56,   Sinus rhythm has replaced Atrial fibrillation   Right bundle branch block is now Present   QT has lengthened     Cardiac Imaging Studies  ECHO on 4-:    No previous study for comparison.    Left ventricle ejection fraction is normal. The estimated left ventricular ejection fraction is 55%.    Normal left ventricular size.    Normal right ventricular size and systolic function.    Mild mitral regurgitation     Coronary angiogram on 4-:  Angiography via left radial  LM normal  LAD mid 100% with collaterals via diagonal; diagonal with 90% lesion  Circ large mild dz  RCA 70% distal RCA segment     JUDY CT Scan on 3-1-2022:  1. The left atrial appendage has been surgically ligated. There is a diminutive residual outpouching at the site of ligation with an orifice diameter of 7 x 5 mm and a depth of 7 mm. No thrombus is noted. This appears similar to the prior study performed 2/10/2021.  2. Moderate biatrial enlargement.  3. Status post three-vessel coronary artery bypass grafting (LIMA to LAD, SVG to 1st diagonal, SVG to distal RCA). The origin of the LIMA graft is not included in the field-of-view, otherwise all three bypass grafts appear widely patent. There is severe multivessel native coronary artery disease.    Lab Review   Lab Results   Component Value Date     (L) 05/05/2019    K 3.6 05/06/2019    CL 95 (L) 05/05/2019    CO2 25 05/05/2019    BUN 23 05/05/2019    CREATININE 1.04 05/05/2019    CALCIUM 9.5 05/05/2019     Lab Results   Component Value Date    WBC 9.6  05/02/2019    HGB 10.8 (L) 05/02/2019    HCT 33.4 (L) 05/02/2019    MCV 94 05/02/2019     05/05/2019     Lab Results   Component Value Date    CHOL 135 04/22/2019     Lab Results   Component Value Date    HDL 42 04/22/2019     Lab Results   Component Value Date    LDLCALC 78 04/22/2019     Lab Results   Component Value Date    TRIG 74 04/22/2019

## 2022-06-13 NOTE — LETTER
6/13/2022    Giovani Ponce MD  [address] [place] [id number]    RE: Giacomo Schneider       Dear Colleague,     I had the pleasure of seeing Giacomo Schneider in the [facility].  Progress Notes by Solomon Vee MD at 1/20/2021  7:50 AM     Author: Solomon Vee MD Service: -- Author Type: Physician    Filed: 1/20/2021  8:25 AM Encounter Date: 1/20/2021 Status: Signed    : Solomon Vee MD (Physician)           Click to link to [facility] Heart Care     [facility] HEART CARE NOTE       Assessment/Plan:   1.  Coronary artery disease s/p 3 V CABG LIMA to LAD, SVG to D1, SVG to RCA on 5-1-2019: The patient had no chest pain or shortness of breath.  Continue aspirin, Lipitor, metoprolol.     2.  Persistent atrial fibrillation: The patient's heart rate has been controlled very well.  He had no palpitations and shortness of breath.    Continue metoprolol 25 mg twice a day.     His [id number]-KHRo score is 5.  The patient really did not like to take a blood thinner chronically because he has been playing softball twice a week.  His CT left atrium appendage was reported small residual pouch which had no thrombus formation, low risk for thrombus formation.  His Eliquis was discontinued in March 2022.  Advised to take aspirin 81 mg daily.     3.  Essential hypertension: His blood pressure has been well controlled with metoprolol 25 mg twice a day.  The target of his blood pressure less than 130/80 mmHg.     4.  Hyperlipidemia.  Continue Lipitor 80 mg nightly.  LDL was well controlled, 64.    Thank you for the opportunity to be involved in the care of Giacomo Schneider Sr.. If you have any questions, please feel free to contact me.  I will see the patient again in 12 months and as needed.    Much or all of the text in this note was generated through the use of Dragon Dictate voice-to-text software. Errors in spelling or words which seem out of context are unintentional.   Sound alike errors, in  particular, may have escaped editing.       History of Present Illness:   It is my pleasure to see Giacomo Schneider Sr. at the [facility] clinic for routine cardiology follow-up.  Giacomo Schneider Sr. is a 77 y.o. male with a medical history of  persistent atrial fibrillation, hyperlipidemia, obstructive sleep apnea, CAD s/p 3 V CABG LIMA to LAD, SVG to D1 and SVG to RCA and JUDY ligation on 5-1-2019 .      The patient states that he has been doing fine since last visit.  He denies chest pain or chest discomfort.  He complains of chronic shortness of breath on exertion which has been stable.  He had no palpitations, shortness of breath, dizziness, orthopnea, PND, or leg swelling. His blood pressure and heart rate have been controlled well.   he has been playing softball twice a week.  He has been off [employer] in March 2022.    Past Medical History:     Patient Active Problem List   Diagnosis     Mixed hyperlipidemia     Atrial fibrillation (H)     Adult Sleep Apnea     Esophageal Reflux     Bilateral inguinal hernia     Essential hypertension     FONSECA (dyspnea on exertion)     Abnormal cardiovascular stress test     Abnormal computed tomography angiography (CTA)     Elevated coronary artery calcium score     S/P CABG (coronary artery bypass graft)     ARF (acute respiratory failure) (H)     Anemia due to blood loss, acute     Coronary artery disease involving coronary bypass graft of native heart without angina pectoris       Past Surgical History:     Past Surgical History:   Procedure Laterality Date     CV CORONARY ANGIOGRAM N/A 4/22/2019    Procedure: Coronary Angiogram;  Surgeon: Qamar Martin MD;  Location: [facility] Cath Lab;  Service: Cardiology     LAPAROSCOPIC INGUINAL HERNIA REPAIR Bilateral 12/3/2015    Procedure: LAPAROSCOPIC BILATERAL INGUINAL HERNIA REPAIR WITH BILATERAL MESH;  Surgeon: Bran Puckett MD;  Location: [facility] OR;  Service:        Family History:     Family  History   Problem Relation Age of Onset     Hypertension Father      No Medical Problems Mother      No Medical Problems Sister      No Medical Problems Brother      No Medical Problems Maternal Aunt      No Medical Problems Maternal Uncle      No Medical Problems Paternal Aunt      No Medical Problems Paternal Uncle      No Medical Problems Maternal Grandmother      No Medical Problems Maternal Grandfather      No Medical Problems Paternal Grandmother      No Medical Problems Paternal Grandfather      Anesthesia problems Neg Hx      Cancer Neg Hx      Clotting disorder Neg Hx      Diabetes Neg Hx      Dislocations Neg Hx      Osteoporosis Neg Hx      Psoriasis Neg Hx      Rashes / Skin problems Neg Hx      Severe sprains Neg Hx      Ulcerative colitis Neg Hx         Social History:    reports that he has been smoking cigars. He has quit using smokeless tobacco.  His smokeless tobacco use included chew. He reports current alcohol use. He reports that he does not use drugs.    Review of Systems:   General: WNL  Eyes: WNL  Ears/Nose/Throat: Hearing Loss  Lungs: Snoring  Heart: Shortness of Breath with activity  Stomach: WNL  Bladder: Frequent Urination at Night  Muscle/Joints: WNL  Skin: Rash  Nervous System: WNL  Mental Health: Anxiety     Blood: WNL    Meds:     Current Outpatient Medications:      acetaminophen (TYLENOL) 325 MG tablet, Take 2 tablets (650 mg total) by mouth every 4 (four) hours as needed., Disp: , Rfl: 0     ascorbic acid, vitamin C, (VITAMIN C) 1000 MG tablet, Take 1,000 mg by mouth daily., Disp: , Rfl:      aspirin 81 MG EC tablet, Take 81 mg by mouth at bedtime., Disp: , Rfl:      atorvastatin (LIPITOR) 80 MG tablet, TAKE 1 TABLET BY MOUTH ONCE DAILY AT BEDTIME, Disp: 90 tablet, Rfl: 2     betamethasone, augmented, (DIPROLENE-AF) 0.05 % cream, Apply 1 application topically as needed.    , Disp: , Rfl:      calcium carbonate-vitamin D3 (CALCIUM 600 + D,3,) 600 mg(1,500mg) -400 unit per tablet,  Take 1 tablet by mouth daily., Disp: , Rfl:      carboxymethylcellulose (REFRESH PLUS) 0.5 % Dpet ophthalmic dropperette, Administer 1 drop to both eyes as needed. , Disp: , Rfl:      chlorthalidone (HYGROTEN) 25 MG tablet, Take 25 mg by mouth at bedtime., Disp: , Rfl:      glucosamine-chondroitin (OSTEO BI-FLEX) 250-200 mg Tab, Take 2 tablets by mouth daily.    , Disp: , Rfl:      hydrocortisone valerate (WEST-SAVANAH) 0.2 % ointment, Apply 1 application topically as needed (uses on hands and ankle).    , Disp: , Rfl:      ketoconazole (NIZORAL) 2 % cream, Apply 1 application topically as needed. Apply a thin layer to affected area(s) twice daily as needed  For athletes foot   , Disp: , Rfl:      krill-om-3-dha-epa-phospho-ast (MEGARED OMEGA-3 KRILL OIL) 511-229-73-64 mg cap, Take 1 capsule by mouth daily., Disp: , Rfl:      metoprolol tartrate (LOPRESSOR) 50 MG tablet, Take 0.5 tablets (25 mg total) by mouth 2 (two) times a day., Disp: 90 tablet, Rfl: 3     multivitamin therapeutic tablet, Take 1 tablet by mouth daily., Disp: , Rfl:      omeprazole (PRILOSEC) 20 MG capsule, Take 20 mg by mouth 2 (two) times a day.    , Disp: , Rfl:      s-adenosylmethionine (CECY-E) 400 mg Tab, Take 1 tablet by mouth daily., Disp: , Rfl:      vitamin E 400 unit capsule, Take 400 Units by mouth daily., Disp: , Rfl:       Allergies:   Budesonide, Sulfa (sulfonamide antibiotics), and Thimerosal      Objective:      Physical Exam  (!) 235 lb (105.2 kg)  6' (1.829 m)  Body mass index is 31.46 kg/m .  /70 (Patient Site: Left Arm, Patient Position: Sitting, Cuff Size: Adult Large)   Pulse 96   Resp 20   Ht 6' (1.829 m)   Wt (!) 235 lb   BMI 31.46 kg/m      General Appearance:   Awake, Alert, No acute distress.   HEENT:  Pupil equal and reactive to light. No scleral icterus; the mucous membranes were moist.   Neck: No cervical bruits. No JVD. No thyromegaly.     Chest: The spine was straight. The chest was symmetric.   Lungs:    Respirations unlabored; Lungs are clear to auscultation. No crackles. No wheezing.   Cardiovascular:   Irrgular rhythm and rate, normal first and second heart sounds with no murmurs. No rubs or gallops.    Abdomen:  Obese. Soft. No tenderness. Non-distended. Bowels sounds are present   Extremities: Equal tibial pulses. No leg edema.   Skin: No rashes or ulcers. Warm, Dry.   Musculoskeletal: No tenderness. No deformity.   Neurologic: Mood and affect are appropriate. No focal deficits.         EKG: Personally reviewed  Sinus rhythm with Fusion complexes   Right bundle branch block   Abnormal ECG   When compared with ECG of 30-APR-2019 08:56,   Sinus rhythm has replaced Atrial fibrillation   Right bundle branch block is now Present   QT has lengthened     Cardiac Imaging Studies  ECHO on 4-:    No previous study for comparison.    Left ventricle ejection fraction is normal. The estimated left ventricular ejection fraction is 55%.    Normal left ventricular size.    Normal right ventricular size and systolic function.    Mild mitral regurgitation     Coronary angiogram on 4-:  Angiography via left radial  LM normal  LAD mid 100% with collaterals via diagonal; diagonal with 90% lesion  Circ large mild dz  RCA 70% distal RCA segment     JUDY CT Scan on 3-1-2022:  1. The left atrial appendage has been surgically ligated. There is a diminutive residual outpouching at the site of ligation with an orifice diameter of 7 x 5 mm and a depth of 7 mm. No thrombus is noted. This appears similar to the prior study performed 2/10/2021.  2. Moderate biatrial enlargement.  3. Status post three-vessel coronary artery bypass grafting (LIMA to LAD, SVG to 1st diagonal, SVG to distal RCA). The origin of the LIMA graft is not included in the field-of-view, otherwise all three bypass grafts appear widely patent. There is severe multivessel native coronary artery disease.    Lab Review   Lab Results   Component Value Date    NA  131 (L) 05/05/2019    K 3.6 05/06/2019    CL 95 (L) 05/05/2019    CO2 25 05/05/2019    BUN 23 05/05/2019    CREATININE 1.04 05/05/2019    CALCIUM 9.5 05/05/2019     Lab Results   Component Value Date    WBC 9.6 05/02/2019    HGB 10.8 (L) 05/02/2019    HCT 33.4 (L) 05/02/2019    MCV 94 05/02/2019     05/05/2019     Lab Results   Component Value Date    CHOL 135 04/22/2019     Lab Results   Component Value Date    HDL 42 04/22/2019     Lab Results   Component Value Date    LDLCALC 78 04/22/2019     Lab Results   Component Value Date    TRIG 74 04/22/2019                        Thank you for allowing me to participate in the care of your patient.      Sincerely,     Solomon Vee MD     [facility] Heart Care  cc:   Solomon Vee MD  [address] [facility] [address]  [place] [address]

## 2022-07-19 ENCOUNTER — TELEPHONE (OUTPATIENT)
Dept: FAMILY MEDICINE | Facility: CLINIC | Age: 77
End: 2022-07-19

## 2022-07-19 NOTE — TELEPHONE ENCOUNTER
[facility] phone call message- general phone call:    Reason for call: Pt fell and has cut he said it's not that bad , but wants to know if his PCP wants him to come in or not.    Return call needed: Yes    OK to leave a message on voice mail? Yes    Primary language: English      needed? No    Call taken on July 19, 2022 at 3:16 PM by Jaylen Lai

## 2022-07-19 NOTE — TELEPHONE ENCOUNTER
Attempt made to contact patient; no answer at listed number;unable to leave a message. another attempt to contact patient will be made    BTRN

## 2022-07-20 ENCOUNTER — OFFICE VISIT (OUTPATIENT)
Dept: FAMILY MEDICINE | Facility: CLINIC | Age: 77
End: 2022-07-20
Payer: COMMERCIAL

## 2022-07-20 VITALS
BODY MASS INDEX: 31.17 KG/M2 | SYSTOLIC BLOOD PRESSURE: 135 MMHG | OXYGEN SATURATION: 98 % | TEMPERATURE: 98.2 F | WEIGHT: 233 LBS | DIASTOLIC BLOOD PRESSURE: 87 MMHG | RESPIRATION RATE: 16 BRPM | HEART RATE: 61 BPM
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
## 2022-07-20 PROCEDURE — 99213 OFFICE O/P EST LOW 20 MIN: CPT | Mod: GC

## 2022-07-20 NOTE — PROGRESS NOTES
Assessment & Plan     Fall, initial encounter  Fall yesterday 7/19.  Playing softball when he tripped, fell forward and caught himself with both hands but then hit his head on the ground.  He noticed he was bleeding above his right eye, placed a bandage on it.  Not on blood thinners.  No vision changes, headache, confusion. No signs or symptoms of concussion and CN 2-12 intact. Small abrasion above right eye.  Does not appear infected.  No laceration, therefore did not need stitches or other intervention. Other abrasions noted on right arm and knee.   - Keep covered   - Follow up if develops neurological symptoms        Return if symptoms worsen or fail to improve.    Kristie Levine MD PGY2  [facility]  07/20/22    I precepted today with Dr. Joshua Rahman   Giacomo is a 77 year old, presenting for the following health issues:  Fall (Fell yesterday.  Hit eyebrow area on right side yesterday morning.)      HPI   Tripped over someone yesterday while playing softball. Happpened about 1030 am.  Fell forward and caught himself with both hands, however then hit his head on the ground.  Noticed bleeding from above his right eye.  Placed a bandage on it and continue playing.  He is not on blood thinners.  No headache, vision changes, confusion.  Patient feels fine.  He is not sure if he needs stitches.  Also has an abrasion of right arm and leg that are not causing him any problems. Eye movements not painful.     Review of Systems   Constitutional, HEENT, cardiovascular, pulmonary, gi and gu systems are negative, except as otherwise noted.      Objective    /87   Pulse 61   Temp 98.2  F (36.8  C)   Resp 16   Wt 105.7 kg (233 lb)   SpO2 98%   BMI 31.17 kg/m    Body mass index is 31.17 kg/m .  Physical Exam   GENERAL: healthy, alert and no distress  EYES: Eyes grossly normal to inspection, PERRL and conjunctivae and sclerae normal. EOMI no nystagmus  RESP:breathing comfortably on  room air  MS: no gross musculoskeletal defects noted, no edema  SKIN: 1cm superficial abrasion above right eye, not bleeding and without discharge. Abrasion to right arm and right leg, no signs of infection.   NEURO: Normal strength and tone, mentation intact and speech normal. CN 2-12 intact. Oriented. Coordinated, normal gait and good balance.       .  ..

## 2022-07-20 NOTE — PROGRESS NOTES
Preceptor Attestation:    I discussed the patient with the resident and evaluated the patient in person. I have verified the content of the note, which accurately reflects my assessment of the patient and the plan of care.   Supervising Physician:  Jacinto Lewis DO.

## 2022-07-20 NOTE — TELEPHONE ENCOUNTER
Patient reports a fall yesterday that resulted in a cut above right eye. Patient states he applied a bandage and it is no longer bleeding but is bruised and swollen. Patient concerned d/t his history of developing a clot in his head after an injury to his head.patient offers no other complaints and is willing to be seen in clinic today    Note routed to   /CECILIA

## 2022-10-04 ENCOUNTER — IMMUNIZATION (OUTPATIENT)
Dept: FAMILY MEDICINE | Facility: CLINIC | Age: 77
End: 2022-10-04
Payer: COMMERCIAL

## 2022-10-04 DIAGNOSIS — Z23 HIGH PRIORITY FOR 2019-NCOV VACCINE: ICD-10-CM

## 2022-10-04 DIAGNOSIS — Z23 NEED FOR PROPHYLACTIC VACCINATION AND INOCULATION AGAINST INFLUENZA: Primary | ICD-10-CM

## 2022-10-04 PROCEDURE — 90662 IIV NO PRSV INCREASED AG IM: CPT

## 2022-10-04 PROCEDURE — 91312 COVID-19,PF,PFIZER BOOSTER BIVALENT: CPT

## 2022-10-04 PROCEDURE — G0008 ADMIN INFLUENZA VIRUS VAC: HCPCS

## 2022-10-04 PROCEDURE — 0124A COVID-19,PF,PFIZER BOOSTER BIVALENT: CPT

## 2022-12-12 DIAGNOSIS — K21.9 GASTROESOPHAGEAL REFLUX DISEASE WITHOUT ESOPHAGITIS: ICD-10-CM

## 2023-01-30 DIAGNOSIS — I25.798 CORONARY ARTERY DISEASE OF OTHER BYPASS GRAFT WITH STABLE ANGINA PECTORIS (H): ICD-10-CM

## 2023-01-30 DIAGNOSIS — I10 PRIMARY HYPERTENSION: ICD-10-CM

## 2023-01-30 DIAGNOSIS — I10 BENIGN ESSENTIAL HYPERTENSION: ICD-10-CM

## 2023-01-30 RX ORDER — METOPROLOL SUCCINATE 100 MG/1
TABLET, EXTENDED RELEASE ORAL
Qty: 90 TABLET | Refills: 3 | Status: SHIPPED | OUTPATIENT
Start: 2023-01-30 | End: 2024-01-26

## 2023-01-30 RX ORDER — CHLORTHALIDONE 25 MG/1
TABLET ORAL
Qty: 90 TABLET | Refills: 3 | Status: SHIPPED | OUTPATIENT
Start: 2023-01-30 | End: 2024-02-19

## 2023-03-09 DIAGNOSIS — E78.2 MIXED HYPERLIPIDEMIA: ICD-10-CM

## 2023-03-10 RX ORDER — ATORVASTATIN CALCIUM 80 MG/1
TABLET, FILM COATED ORAL
Qty: 90 TABLET | Refills: 1 | Status: SHIPPED | OUTPATIENT
Start: 2023-03-10 | End: 2023-09-05

## 2023-04-22 ENCOUNTER — HEALTH MAINTENANCE LETTER (OUTPATIENT)
Age: 78
End: 2023-04-22

## 2023-04-27 ENCOUNTER — OFFICE VISIT (OUTPATIENT)
Dept: FAMILY MEDICINE | Facility: CLINIC | Age: 78
End: 2023-04-27
Payer: COMMERCIAL

## 2023-04-27 VITALS
DIASTOLIC BLOOD PRESSURE: 78 MMHG | RESPIRATION RATE: 16 BRPM | WEIGHT: 230 LBS | TEMPERATURE: 97.3 F | SYSTOLIC BLOOD PRESSURE: 147 MMHG | BODY MASS INDEX: 30.76 KG/M2 | OXYGEN SATURATION: 100 % | HEART RATE: 55 BPM

## 2023-04-27 DIAGNOSIS — I10 PRIMARY HYPERTENSION: ICD-10-CM

## 2023-04-27 DIAGNOSIS — Z00.00 MEDICARE ANNUAL WELLNESS VISIT, SUBSEQUENT: ICD-10-CM

## 2023-04-27 DIAGNOSIS — I25.798 CORONARY ARTERY DISEASE OF OTHER BYPASS GRAFT WITH STABLE ANGINA PECTORIS (H): ICD-10-CM

## 2023-04-27 DIAGNOSIS — R21 RASH: Primary | ICD-10-CM

## 2023-04-27 DIAGNOSIS — I48.11 LONGSTANDING PERSISTENT ATRIAL FIBRILLATION (H): ICD-10-CM

## 2023-04-27 DIAGNOSIS — Z95.1 STATUS POST CORONARY ARTERY BYPASS GRAFT: ICD-10-CM

## 2023-04-27 PROCEDURE — 80061 LIPID PANEL: CPT | Performed by: FAMILY MEDICINE

## 2023-04-27 PROCEDURE — 99397 PER PM REEVAL EST PAT 65+ YR: CPT | Performed by: FAMILY MEDICINE

## 2023-04-27 PROCEDURE — 36415 COLL VENOUS BLD VENIPUNCTURE: CPT | Performed by: FAMILY MEDICINE

## 2023-04-27 PROCEDURE — 99214 OFFICE O/P EST MOD 30 MIN: CPT | Mod: 25 | Performed by: FAMILY MEDICINE

## 2023-04-27 PROCEDURE — 80048 BASIC METABOLIC PNL TOTAL CA: CPT | Performed by: FAMILY MEDICINE

## 2023-04-27 RX ORDER — TRIAMCINOLONE ACETONIDE 1 MG/G
CREAM TOPICAL 2 TIMES DAILY PRN
Qty: 45 G | Refills: 1 | Status: SHIPPED | OUTPATIENT
Start: 2023-04-27

## 2023-04-27 RX ORDER — INDOMETHACIN 50 MG/1
50 CAPSULE ORAL
COMMUNITY
End: 2024-03-28 | Stop reason: SINTOL

## 2023-04-27 RX ORDER — LISINOPRIL 5 MG/1
5 TABLET ORAL DAILY
Qty: 30 TABLET | Refills: 1 | Status: SHIPPED | OUTPATIENT
Start: 2023-04-27 | End: 2023-06-09

## 2023-04-27 ASSESSMENT — ENCOUNTER SYMPTOMS
NERVOUS/ANXIOUS: 0
DIZZINESS: 0
FREQUENCY: 0
DIARRHEA: 0
CHILLS: 0
WEAKNESS: 0
PARESTHESIAS: 0
JOINT SWELLING: 0
SORE THROAT: 0
SHORTNESS OF BREATH: 0
NAUSEA: 0
HEADACHES: 0
HEMATURIA: 0
ABDOMINAL PAIN: 0
HEMATOCHEZIA: 0
FEVER: 0
HEARTBURN: 0
ARTHRALGIAS: 0
EYE PAIN: 0
MYALGIAS: 0
CONSTIPATION: 0
PALPITATIONS: 0
COUGH: 0
DYSURIA: 0

## 2023-04-27 ASSESSMENT — ACTIVITIES OF DAILY LIVING (ADL): CURRENT_FUNCTION: NO ASSISTANCE NEEDED

## 2023-04-27 NOTE — PROGRESS NOTES
"SUBJECTIVE:   Giacomo is a 78 year old who presents for Preventive Visit.       View : No data to display.              Are you in the first 12 months of your Medicare coverage?      Healthy Habits:     In general, how would you rate your overall health?  Excellent    Frequency of exercise:  4-5 days/week    Duration of exercise:  Greater than 60 minutes    Do you usually eat at least 4 servings of fruit and vegetables a day, include whole grains    & fiber and avoid regularly eating high fat or \"junk\" foods?  No    Taking medications regularly:  Yes    Medication side effects:  None    Ability to successfully perform activities of daily living:  No assistance needed    Home Safety:  No safety concerns identified    Hearing Impairment:  Need to ask people to speak up or repeat themselves    In the past 6 months, have you been bothered by leaking of urine?  No    In general, how would you rate your overall mental or emotional health?  Excellent      PHQ-2 Total Score: 0    Additional concerns today:  No      Have you ever done Advance Care Planning? (For example, a Health Directive, POLST, or a discussion with a medical provider or your loved ones about your wishes): Yes, advance care planning is on file.      Hearing test not done--patient wishes    Hearing Acuity:     Hearing Assessment: not done--followed by patient preference   Fall risk  not high risk  click delete button to remove this line now  Cognitive Screening   1) Repeat 3 items (Leader, Season, Table)    2) Clock draw: not done  3) 3 item recall: Recalls 3 objects  Results: 3 items recalled: COGNITIVE IMPAIRMENT LESS LIKELY    Mini-CogTM Copyright KOLBY Lawrence. Licensed by the author for use in [facility]; reprinted with permission (toni@.[facility]). All rights reserved.      Do you have sleep apnea, excessive snoring or daytime drowsiness?: yes    Reviewed and updated as needed this visit by clinical staff   Tobacco  Allergies  Meds          "     Reviewed and updated as needed this visit by Provider                 Social History     Tobacco Use     Smoking status: Former     Packs/day: 0.00     Types: Cigars, Cigarettes     Quit date: 2021     Years since quittin.2     Smokeless tobacco: Former     Types: Chew     Tobacco comments:     1 cigar per day   Vaping Use     Vaping status: Not on file   Substance Use Topics     Alcohol use: Yes     Comment: Alcoholic Drinks/day: rarely             2023     9:22 AM   Alcohol Use   Prescreen: >3 drinks/day or >7 drinks/week? No     Do you have a current opioid prescription? No  Do you use any other controlled substances or medications that are not prescribed by a provider? None              Current providers sharing in care for this patient include:   Patient Care Team:  Giovani Ponce MD as PCP - General (Family Practice)  Antonina Reyes, PharmD as Pharmacist (Pharmacist)  Solomon Vee MD as Assigned Heart and Vascular Provider  Giovani Ponce MD as Assigned PCP  Genny Boles [facility] as Assigned MTM Pharmacist    The following health maintenance items are reviewed in Epic and correct as of today:  Health Maintenance   Topic Date Due     COLORECTAL CANCER SCREENING  2020     MEDICARE ANNUAL WELLNESS VISIT  2023     DTAP/TDAP/TD IMMUNIZATION (4 - Td or Tdap) 2024     FALL RISK ASSESSMENT  2024     LIPID  10/21/2026     ADVANCE CARE PLANNING  10/21/2026     PHQ-2 (once per calendar year)  Completed     INFLUENZA VACCINE  Completed     Pneumococcal Vaccine: 65+ Years  Completed     ZOSTER IMMUNIZATION  Completed     COVID-19 Vaccine  Completed     IPV IMMUNIZATION  Aged Out     MENINGITIS IMMUNIZATION  Aged Out     Lab work is in process          Review of Systems   Constitutional: Negative for chills and fever.   HENT: Positive for congestion. Negative for ear pain, hearing loss and sore throat.    Eyes: Negative for pain and visual disturbance.  "  Respiratory: Negative for cough and shortness of breath.    Cardiovascular: Negative for chest pain, palpitations and peripheral edema.   Gastrointestinal: Negative for abdominal pain, constipation, diarrhea, heartburn, hematochezia and nausea.   Genitourinary: Negative for dysuria, frequency, genital sores, hematuria, impotence, penile discharge and urgency.   Musculoskeletal: Negative for arthralgias, joint swelling and myalgias.   Skin: Negative for rash.   Neurological: Negative for dizziness, weakness, headaches and paresthesias.   Psychiatric/Behavioral: Negative for mood changes. The patient is not nervous/anxious.          OBJECTIVE:   BP (!) 147/78 (BP Location: Left arm, Patient Position: Sitting, Cuff Size: Adult Regular)   Pulse 55   Temp 97.3  F (36.3  C) (Oral)   Resp 16   Wt 104.3 kg (230 lb)   SpO2 100%   BMI 30.76 kg/m   Estimated body mass index is 30.76 kg/m  as calculated from the following:    Height as of 11/24/21: 1.842 m (6' 0.5\").    Weight as of this encounter: 104.3 kg (230 lb).  Physical Exam  GENERAL: healthy, alert and no distress  EYES: Eyes grossly normal to inspection, PERRL and conjunctivae and sclerae normal  NECK: no adenopathy, no asymmetry, masses, or scars and thyroid normal to palpation  RESP: lungs clear to auscultation - no rales, rhonchi or wheezes  CV: irregularly irregular rhythm, normal S1 S2, no S3 or S4, no murmur, click or rub, peripheral pulses strong and no peripheral edema.  Sternotomy scar  ABDOMEN: soft, nontender, no hepatosplenomegaly, no masses and bowel sounds normal  MS: no gross musculoskeletal defects noted, no edema        ASSESSMENT / PLAN:   1.  Medicare wellness: No major deficiency identified.  He is ambivalent about colon cancer screening given his age.  He will think about it and let me know what he wants to do.  Dislike of the prep is the main issue.    2.  Itching along the sternotomy scar.  He seems to have keloidal tissue there.  We will " try triamcinolone as needed    3.  Hypertension not at goal.  Continue metoprolol and chlorthalidone but add lisinopril 5 mg daily.  Check BMP.    4.  CAD.  Asymptomatic.  Titrate statin to keep LDL under 70 and continue aspirin 81 mg daily    5.  Persistent atrial fibrillation.  He is rate controlled.  Anticoagulation not indicated because of his left atrial appendage procedure.      He reports that he quit smoking about 2 years ago. His smoking use included cigars. He has quit using smokeless tobacco.  His smokeless tobacco use included chew.      Appropriate preventive services were discussed with this patient, including applicable screening as appropriate for cardiovascular disease, diabetes, osteopenia/osteoporosis, and glaucoma.  As appropriate for age/gender, discussed screening for colorectal cancer, prostate cancer, breast cancer, and cervical cancer. Checklist reviewing preventive services available has been given to the patient.    Reviewed patients plan of care and provided an AVS. The Basic Care Plan (routine screening as documented in Health Maintenance) for Giacomo meets the Care Plan requirement. This Care Plan has been established and reviewed with the Patient.          Giovani Ponce MD  [facility]    Identified Health Risks:

## 2023-04-27 NOTE — PATIENT INSTRUCTIONS
You are due for colonoscopy because you had polyps in 2017.  I think doing colonoscopy is up to you.  I'm happy to arrange it if you're interested, but I respect your choice not to screen.    Start lisinopril 5 mg daily for your BP  Send me a report of your BP at home in 7-10 days.

## 2023-04-28 LAB
ANION GAP SERPL CALCULATED.3IONS-SCNC: 12 MMOL/L (ref 7–15)
BUN SERPL-MCNC: 22.9 MG/DL (ref 8–23)
CALCIUM SERPL-MCNC: 9.8 MG/DL (ref 8.8–10.2)
CHLORIDE SERPL-SCNC: 102 MMOL/L (ref 98–107)
CHOLEST SERPL-MCNC: 110 MG/DL
CREAT SERPL-MCNC: 1.04 MG/DL (ref 0.67–1.17)
DEPRECATED HCO3 PLAS-SCNC: 24 MMOL/L (ref 22–29)
GFR SERPL CREATININE-BSD FRML MDRD: 73 ML/MIN/1.73M2
GLUCOSE SERPL-MCNC: 97 MG/DL (ref 70–99)
HDLC SERPL-MCNC: 38 MG/DL
LDLC SERPL CALC-MCNC: 50 MG/DL
NONHDLC SERPL-MCNC: 72 MG/DL
POTASSIUM SERPL-SCNC: 4.2 MMOL/L (ref 3.4–5.3)
SODIUM SERPL-SCNC: 138 MMOL/L (ref 136–145)
TRIGL SERPL-MCNC: 108 MG/DL

## 2023-04-29 NOTE — RESULT ENCOUNTER NOTE
Please call pt Monday with this message    Mehrdad  Your labs are good.  My goal is to keep your LDL under 70, and it's 50, so keep taking atorvastatin as you are.  Your kidney tests and blood salts are normal.  Please ask him to send me a message in a week or so about his BP's, given the addition of lisinopril 5 mg daily last week.  Saurabh Ponce MD

## 2023-06-08 DIAGNOSIS — K21.9 GASTROESOPHAGEAL REFLUX DISEASE WITHOUT ESOPHAGITIS: ICD-10-CM

## 2023-06-08 DIAGNOSIS — I10 PRIMARY HYPERTENSION: ICD-10-CM

## 2023-06-09 RX ORDER — LISINOPRIL 5 MG/1
TABLET ORAL
Qty: 90 TABLET | Refills: 3 | Status: SHIPPED | OUTPATIENT
Start: 2023-06-09 | End: 2024-03-28

## 2023-09-03 DIAGNOSIS — E78.2 MIXED HYPERLIPIDEMIA: ICD-10-CM

## 2023-09-05 RX ORDER — ATORVASTATIN CALCIUM 80 MG/1
80 TABLET, FILM COATED ORAL DAILY
Qty: 90 TABLET | Refills: 0 | Status: SHIPPED | OUTPATIENT
Start: 2023-09-05 | End: 2023-12-06

## 2023-09-21 ENCOUNTER — MYC MEDICAL ADVICE (OUTPATIENT)
Dept: FAMILY MEDICINE | Facility: CLINIC | Age: 78
End: 2023-09-21
Payer: COMMERCIAL

## 2023-12-06 ENCOUNTER — TELEPHONE (OUTPATIENT)
Dept: CARDIOLOGY | Facility: CLINIC | Age: 78
End: 2023-12-06
Payer: COMMERCIAL

## 2023-12-06 DIAGNOSIS — I10 PRIMARY HYPERTENSION: ICD-10-CM

## 2023-12-06 DIAGNOSIS — I25.798 CORONARY ARTERY DISEASE OF OTHER BYPASS GRAFT WITH STABLE ANGINA PECTORIS (H): ICD-10-CM

## 2023-12-06 DIAGNOSIS — E78.2 MIXED HYPERLIPIDEMIA: Primary | ICD-10-CM

## 2023-12-06 RX ORDER — ATORVASTATIN CALCIUM 80 MG/1
80 TABLET, FILM COATED ORAL DAILY
Qty: 90 TABLET | Refills: 0 | Status: SHIPPED | OUTPATIENT
Start: 2023-12-06 | End: 2023-12-22

## 2023-12-06 NOTE — TELEPHONE ENCOUNTER
[facility] Call Center    Phone Message    May a detailed message be left on voicemail: yes     Reason for Call: Medication Refill Request    Has the patient contacted the pharmacy for the refill? Yes   Name of medication being requested: atorvastatin (LIPITOR) 80 MG tablet   Provider who prescribed the medication: Dr. Vee  Pharmacy:  [facility] [id number]    Date medication is needed: 12/7   Pt called in to schedule appt but dr. Vee templates pulls up for April for [facility] and March for [facility], pt has one tablet left.    Action Taken: Other: Cardiology    Travel Screening: Not Applicable    Thank you!  [facility]

## 2023-12-22 ENCOUNTER — OFFICE VISIT (OUTPATIENT)
Dept: CARDIOLOGY | Facility: CLINIC | Age: 78
End: 2023-12-22
Attending: INTERNAL MEDICINE
Payer: COMMERCIAL

## 2023-12-22 VITALS
BODY MASS INDEX: 30.76 KG/M2 | WEIGHT: 230 LBS | SYSTOLIC BLOOD PRESSURE: 136 MMHG | DIASTOLIC BLOOD PRESSURE: 72 MMHG | HEART RATE: 65 BPM | OXYGEN SATURATION: 96 %

## 2023-12-22 DIAGNOSIS — E78.2 MIXED HYPERLIPIDEMIA: ICD-10-CM

## 2023-12-22 DIAGNOSIS — I48.11 LONGSTANDING PERSISTENT ATRIAL FIBRILLATION (H): Primary | ICD-10-CM

## 2023-12-22 DIAGNOSIS — I10 PRIMARY HYPERTENSION: ICD-10-CM

## 2023-12-22 DIAGNOSIS — I25.798 CORONARY ARTERY DISEASE OF OTHER BYPASS GRAFT WITH STABLE ANGINA PECTORIS (H): ICD-10-CM

## 2023-12-22 PROCEDURE — 99214 OFFICE O/P EST MOD 30 MIN: CPT

## 2023-12-22 RX ORDER — ATORVASTATIN CALCIUM 80 MG/1
80 TABLET, FILM COATED ORAL DAILY
Qty: 90 TABLET | Refills: 0 | Status: SHIPPED | OUTPATIENT
Start: 2023-12-22 | End: 2024-03-28

## 2023-12-22 NOTE — PROGRESS NOTES
HEART CARE ENCOUNTER CONSULTATON NOTE      [facility]  [phone]      Assessment/Recommendations   Assessment:   Coronary artery disease: s/p CABG x3 (LIMA to LAD, SVG to D1, SVG to RCA) 5/2019 - no angina on aspirin and statin  Persistent atrial fibrillation: JUDY ligation with CABG, small residual pouch on CT and continued on aspirin and metoprolol - rate controlled, asymptomatic  Hypertension: Controlled - lisinopril, metoprolol succinate, chlorthalidone  Hyperlipidemia: LDL 50 on atorvastatin 80 mg    Plan:   Continue current medications without change, refills provided  Follow up in 1 year or sooner as needed     History of Present Illness/Subjective    HPI: Giacomo Schneider is a 78 year old male with PMHx of CAD, CABG, atrial fibrillation, HTN, HLD, IFG, reflux, CARMITA presents for follow up.  He is feeling very well with no anginal symptoms, no worsening shortness of breath.  Remains asymptomatic in atrial fibrillation.  He monitors his blood pressure once weekly which has been well-controlled usually around 120/70s.    He denies fatigue, lightheadedness, shortness of breath, dyspnea on exertion, orthopnea, PND, palpitations, chest pain, abdominal fullness/bloating, and lower extremity edema.           Physical Examination  Review of Systems   Vitals: /72 (BP Location: Right arm, Patient Position: Sitting, Cuff Size: Adult Large)   Pulse 65   Wt 104.3 kg (230 lb)   SpO2 96%   BMI 30.76 kg/m    BMI= Body mass index is 30.76 kg/m .  Wt Readings from Last 3 Encounters:   12/22/23 104.3 kg (230 lb)   04/27/23 104.3 kg (230 lb)   07/20/22 105.7 kg (233 lb)           ENT/Mouth: membranes moist, no oral lesions or bleeding gums.      EYES:  no scleral icterus, normal conjunctivae                    Neck: No carotid bruit or thyromegaly   Chest/Lungs:   lungs are clear to auscultation, no rales or wheezing, equal chest wall expansion    Cardiovascular:   Irregular. Normal first and  second heart sounds with no murmurs, rubs, or gallops; the carotid, radial and posterior tibial pulses are intact, absent edema bilaterally    Abdomen:  no tenderness; bowel sounds are present   Extremities: no cyanosis or clubbing   Skin: no xanthelasma, warm.    Neurologic: no tremors     Psychiatric: alert and oriented x3, calm        Please refer above for cardiac ROS details.        Medical History  Surgical History Family History Social History   Past Medical History:   Diagnosis Date     Atrial fibrillation (H)      BPH (benign prostatic hyperplasia)      CAD (coronary artery disease)      Cancer (H)      Cancer (H)     basal cell carcinoma of skin     Coronary artery disease of other bypass graft with stable angina pectoris (H24) 11/18/2021     Coronary atherosclerosis      Diverticulosis      GERD (gastroesophageal reflux disease)      H/O cardiovascular stress test     negative 2012     Hyperlipemia      Impaired fasting glucose      Low back pain      Primary hypertension 11/24/2021     Sleep apnea     unable to tolerate CPAP     Spermatocele      Past Surgical History:   Procedure Laterality Date     CV CORONARY ANGIOGRAM N/A 4/22/2019    Procedure: Coronary Angiogram;  Surgeon: Qamar Martin MD;  Location: [facility] Cath Lab;  Service: Cardiology     LAPAROSCOPIC HERNIORRHAPHY INGUINAL Bilateral 12/3/2015    Procedure: LAPAROSCOPIC BILATERAL INGUINAL HERNIA REPAIR WITH BILATERAL MESH;  Surgeon: Bran Puckett MD;  Location: [facility] OR;  Service:      SOFT TISSUE SURGERY      basal cell cancer     Family History   Problem Relation Age of Onset     Hypertension Mother      Coronary Artery Disease Mother      Unknown/Adopted Sister      Diabetes Father      Gastrointestinal Disease Brother         pancreatic cancer in late 80s     Cancer No family hx of      Hyperlipidemia No family hx of      Cerebrovascular Disease No family hx of      Breast Cancer No family hx of      Colon  Cancer No family hx of      Prostate Cancer No family hx of      Other Cancer No family hx of      Depression No family hx of      Anxiety Disorder No family hx of      Mental Illness No family hx of      Substance Abuse No family hx of      Anesthesia Reaction No family hx of      Asthma No family hx of      Osteoporosis No family hx of      Genetic Disorder No family hx of      Thyroid Disease No family hx of      Obesity No family hx of      Hypertension Father      No Known Problems Mother      No Known Problems Sister      No Known Problems Brother      No Known Problems Maternal Aunt      No Known Problems Maternal Uncle      No Known Problems Paternal Aunt      No Known Problems Paternal Uncle      No Known Problems Maternal Grandmother      No Known Problems Maternal Grandfather      No Known Problems Paternal Grandmother      No Known Problems Paternal Grandfather      Clotting Disorder No family hx of      Diabetes No family hx of      Dislocations No family hx of      Psoriasis No family hx of      Rashes/Skin Problems No family hx of      Severe sprains No family hx of      Ulcerative Colitis No family hx of         Social History     Socioeconomic History     Marital status:      Spouse name: Not on file     Number of children: 4     Years of education: Not on file     Highest education level: Not on file   Occupational History     Occupation: retired teacher     Comment: [employer].  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    In [place] [employer]     Social Determinants of Health     Financial Resource Strain: Not on file   Food Insecurity: Not on file   Transportation Needs: Not on file   Physical Activity: Not on file   Stress: Not on file   Social Connections: Not on file   Interpersonal Safety: Not on file   Housing Stability: Not on file           Medications  Allergies   Current Outpatient Medications   Medication Sig Dispense Refill     aspirin 81 MG EC tablet Take 81 mg by mouth daily       atorvastatin (LIPITOR) 80 MG tablet Take 1 tablet (80 mg) by mouth daily **Due for follow-up with Dr. Vee for refills** 90 tablet 0     chlorthalidone (HYGROTON) 25 MG tablet TAKE ONE TABLET BY MOUTH ONCE DAILY AT BEDTIME 90 tablet 3     indomethacin (INDOCIN) 50 MG capsule Take 50 mg by mouth nightly as needed for moderate pain       lisinopril (ZESTRIL) 5 MG tablet TAKE ONE (1) TABLET (5 MG) BY MOUTH DAILY 90 tablet 3     metoprolol succinate ER (TOPROL XL) 100 MG 24 hr tablet TAKE ONE TABLET BY MOUTH ONCE DAILY AT BEDTIME 90 tablet 3     omeprazole (PRILOSEC) 20 MG DR capsule TAKE ONE CAPSULE BY MOUTH TWICE A  capsule 3     triamcinolone (KENALOG) 0.1 % external cream Apply topically 2 times daily as needed for irritation 45 g 1     carboxymethylcellulose (REFRESH PLUS) 0.5 % SOLN ophthalmic solution 1 drop (Patient not taking: Reported on 4/27/2023)       MegaRed Omega-3 Krill Oil 500 MG CAPS Take 1 capsule by mouth (Patient not taking: Reported on 4/27/2023)       Multiple Vitamins-Minerals (ONCOVITE) TABS Take 1 tablet by mouth (Patient not taking: Reported on 4/27/2023)         Allergies   Allergen Reactions     Budesonide      Sulfa Antibiotics Unknown     Thimerosal (Thiomersal)           Lab Results    Chemistry/lipid CBC Cardiac Enzymes/BNP/TSH/INR   Recent Labs   Lab  "Test 04/27/23  1007 10/21/21  0932 02/13/20  0947   CHOL 110   < > 112.1   HDL 38*   < > 46.3   LDL 50   < > 55   TRIG 108   < > 51.3   CHOLHDLRATIO  --   --  2.4    < > = values in this interval not displayed.     Recent Labs   Lab Test 04/27/23  1007 10/21/21  0932 02/13/20  0947   LDL 50 64 55     Recent Labs   Lab Test 04/27/23  1007      POTASSIUM 4.2   CHLORIDE 102   CO2 24   GLC 97   BUN 22.9   CR 1.04   GFRESTIMATED 73   EMILY 9.8     Recent Labs   Lab Test 04/27/23  1007 03/11/22  0843 10/21/21  0932   CR 1.04 1.2 1.05     Recent Labs   Lab Test 10/21/21  0932 04/30/19  0940 04/12/18  1322   A1C 6.2* 6.3* 5.9*          Recent Labs   Lab Test 05/05/19  0643 05/03/19  0435 05/02/19  0537   WBC  --   --  9.6   HGB  --   --  10.8*   HCT  --   --  33.4*   MCV  --   --  94      < > 117*    < > = values in this interval not displayed.     Recent Labs   Lab Test 05/02/19  0537 05/01/19  1205 05/01/19  1051   HGB 10.8* 10.9* 11.2*    No results for input(s): \"TROPONINI\" in the last 24931 hours.  No results for input(s): \"BNP\", \"NTBNPI\", \"NTBNP\" in the last 27185 hours.  No results for input(s): \"TSH\" in the last 67687 hours.  Recent Labs   Lab Test 05/02/19  0537 05/01/19  1205 05/01/19  1045   INR 1.46* 1.37* 1.51*        Mary Kay Baird PA-C                                       "

## 2023-12-22 NOTE — LETTER
12/22/2023    Giovani Ponce MD  [address] [place] [id number]    RE: Giacomo Schneider       Dear Colleague,     I had the pleasure of seeing Giacomo Schneider in the [facility].    HEART CARE ENCOUNTER CONSULTATON NOTE      [facility]  [phone]      Assessment/Recommendations   Assessment:   Coronary artery disease: s/p CABG x3 (LIMA to LAD, SVG to D1, SVG to RCA) 5/2019 - no angina on aspirin and statin  Persistent atrial fibrillation: JUDY ligation with CABG, small residual pouch on CT and continued on aspirin and metoprolol - rate controlled, asymptomatic  Hypertension: Controlled - lisinopril, metoprolol succinate, chlorthalidone  Hyperlipidemia: LDL 50 on atorvastatin 80 mg    Plan:   Continue current medications without change, refills provided  Follow up in 1 year or sooner as needed     History of Present Illness/Subjective    HPI: Giacomo Schneider is a 78 year old male with PMHx of CAD, CABG, atrial fibrillation, HTN, HLD, IFG, reflux, CARMITA presents for follow up.  He is feeling very well with no anginal symptoms, no worsening shortness of breath.  Remains asymptomatic in atrial fibrillation.  He monitors his blood pressure once weekly which has been well-controlled usually around 120/70s.    He denies fatigue, lightheadedness, shortness of breath, dyspnea on exertion, orthopnea, PND, palpitations, chest pain, abdominal fullness/bloating, and lower extremity edema.           Physical Examination  Review of Systems   Vitals: /72 (BP Location: Right arm, Patient Position: Sitting, Cuff Size: Adult Large)   Pulse 65   Wt 104.3 kg (230 lb)   SpO2 96%   BMI 30.76 kg/m    BMI= Body mass index is 30.76 kg/m .  Wt Readings from Last 3 Encounters:   12/22/23 104.3 kg (230 lb)   04/27/23 104.3 kg (230 lb)   07/20/22 105.7 kg (233 lb)           ENT/Mouth: membranes moist, no oral lesions or bleeding gums.      EYES:  no scleral icterus, normal conjunctivae                     Neck: No carotid bruit or thyromegaly   Chest/Lungs:   lungs are clear to auscultation, no rales or wheezing, equal chest wall expansion    Cardiovascular:   Irregular. Normal first and second heart sounds with no murmurs, rubs, or gallops; the carotid, radial and posterior tibial pulses are intact, absent edema bilaterally    Abdomen:  no tenderness; bowel sounds are present   Extremities: no cyanosis or clubbing   Skin: no xanthelasma, warm.    Neurologic: no tremors     Psychiatric: alert and oriented x3, calm        Please refer above for cardiac ROS details.        Medical History  Surgical History Family History Social History   Past Medical History:   Diagnosis Date    Atrial fibrillation (H)     BPH (benign prostatic hyperplasia)     CAD (coronary artery disease)     Cancer (H)     Cancer (H)     basal cell carcinoma of skin    Coronary artery disease of other bypass graft with stable angina pectoris (H24) 11/18/2021    Coronary atherosclerosis     Diverticulosis     GERD (gastroesophageal reflux disease)     H/O cardiovascular stress test     negative 2012    Hyperlipemia     Impaired fasting glucose     Low back pain     Primary hypertension 11/24/2021    Sleep apnea     unable to tolerate CPAP    Spermatocele      Past Surgical History:   Procedure Laterality Date    CV CORONARY ANGIOGRAM N/A 4/22/2019    Procedure: Coronary Angiogram;  Surgeon: Qamar Martin MD;  Location: [facility] Cath Lab;  Service: Cardiology    LAPAROSCOPIC HERNIORRHAPHY INGUINAL Bilateral 12/3/2015    Procedure: LAPAROSCOPIC BILATERAL INGUINAL HERNIA REPAIR WITH BILATERAL MESH;  Surgeon: Bran Puckett MD;  Location: [facility] OR;  Service:     SOFT TISSUE SURGERY      basal cell cancer     Family History   Problem Relation Age of Onset    Hypertension Mother     Coronary Artery Disease Mother     Unknown/Adopted Sister     Diabetes Father     Gastrointestinal Disease Brother          pancreatic cancer in late 80s    Cancer No family hx of     Hyperlipidemia No family hx of     Cerebrovascular Disease No family hx of     Breast Cancer No family hx of     Colon Cancer No family hx of     Prostate Cancer No family hx of     Other Cancer No family hx of     Depression No family hx of     Anxiety Disorder No family hx of     Mental Illness No family hx of     Substance Abuse No family hx of     Anesthesia Reaction No family hx of     Asthma No family hx of     Osteoporosis No family hx of     Genetic Disorder No family hx of     Thyroid Disease No family hx of     Obesity No family hx of     Hypertension Father     No Known Problems Mother     No Known Problems Sister     No Known Problems Brother     No Known Problems Maternal Aunt     No Known Problems Maternal Uncle     No Known Problems Paternal Aunt     No Known Problems Paternal Uncle     No Known Problems Maternal Grandmother     No Known Problems Maternal Grandfather     No Known Problems Paternal Grandmother     No Known Problems Paternal Grandfather     Clotting Disorder No family hx of     Diabetes No family hx of     Dislocations No family hx of     Psoriasis No family hx of     Rashes/Skin Problems No family hx of     Severe sprains No family hx of     Ulcerative Colitis No family hx of         Social History     Socioeconomic History    Marital status:      Spouse name: Not on file    Number of children: 4    Years of education: Not on file    Highest education level: Not on file   Occupational History    Occupation: retired teacher     Comment: [employer].  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    In [place] baseball [facility]     Social Determinants of Health     Financial Resource Strain: Not on file   Food Insecurity: Not on file   Transportation Needs: Not on file   Physical Activity: Not on file   Stress: Not on file   Social Connections: Not on file   Interpersonal Safety: Not on file   Housing Stability: Not on file           Medications  Allergies   Current Outpatient Medications   Medication Sig Dispense Refill    aspirin 81 MG EC tablet Take 81 mg by mouth daily      atorvastatin (LIPITOR) 80 MG tablet Take 1 tablet (80 mg) by mouth daily **Due for follow-up with Dr. Vee for refills** 90 tablet 0    chlorthalidone (HYGROTON) 25 MG tablet TAKE ONE TABLET BY MOUTH ONCE DAILY AT BEDTIME 90 tablet 3    indomethacin (INDOCIN) 50 MG capsule Take 50 mg by mouth nightly as needed for moderate pain      lisinopril (ZESTRIL) 5 MG tablet TAKE ONE (1) TABLET (5 MG) BY MOUTH DAILY 90 tablet 3    metoprolol succinate ER (TOPROL XL) 100 MG 24 hr tablet TAKE ONE TABLET BY MOUTH ONCE DAILY AT BEDTIME 90 tablet 3    omeprazole (PRILOSEC) 20 MG DR capsule TAKE ONE CAPSULE BY MOUTH TWICE A  capsule 3    triamcinolone (KENALOG) 0.1 % external cream Apply topically 2 times daily as needed for irritation 45 g 1    carboxymethylcellulose (REFRESH PLUS) 0.5 % SOLN ophthalmic solution 1 drop (Patient not taking: Reported on 4/27/2023)      MegaRed Omega-3 Krill Oil 500 MG CAPS Take 1 capsule by mouth (Patient not taking: Reported on 4/27/2023)      Multiple Vitamins-Minerals (ONCOVITE) TABS Take 1 tablet by mouth (Patient not taking: Reported on 4/27/2023)         Allergies   Allergen Reactions    Budesonide     Sulfa Antibiotics Unknown  "   Thimerosal (Thiomersal)           Lab Results    Chemistry/lipid CBC Cardiac Enzymes/BNP/TSH/INR   Recent Labs   Lab Test 04/27/23  1007 10/21/21  0932 02/13/20  0947   CHOL 110   < > 112.1   HDL 38*   < > 46.3   LDL 50   < > 55   TRIG 108   < > 51.3   CHOLHDLRATIO  --   --  2.4    < > = values in this interval not displayed.     Recent Labs   Lab Test 04/27/23  1007 10/21/21  0932 02/13/20  0947   LDL 50 64 55     Recent Labs   Lab Test 04/27/23  1007      POTASSIUM 4.2   CHLORIDE 102   CO2 24   GLC 97   BUN 22.9   CR 1.04   GFRESTIMATED 73   EMILY 9.8     Recent Labs   Lab Test 04/27/23  1007 03/11/22  0843 10/21/21  0932   CR 1.04 1.2 1.05     Recent Labs   Lab Test 10/21/21  0932 04/30/19  0940 04/12/18  1322   A1C 6.2* 6.3* 5.9*          Recent Labs   Lab Test 05/05/19  0643 05/03/19  0435 05/02/19  0537   WBC  --   --  9.6   HGB  --   --  10.8*   HCT  --   --  33.4*   MCV  --   --  94      < > 117*    < > = values in this interval not displayed.     Recent Labs   Lab Test 05/02/19  0537 05/01/19  1205 05/01/19  1051   HGB 10.8* 10.9* 11.2*    No results for input(s): \"TROPONINI\" in the last 68747 hours.  No results for input(s): \"BNP\", \"NTBNPI\", \"NTBNP\" in the last 13021 hours.  No results for input(s): \"TSH\" in the last 43717 hours.  Recent Labs   Lab Test 05/02/19  0537 05/01/19  1205 05/01/19  1045   INR 1.46* 1.37* 1.51*        Mary Kay Baird PA-C        Thank you for allowing me to participate in the care of your patient.      Sincerely,     Mary Kay Rodriguez PA-C     [facility]  cc:   Solomon Vee MD  [address]  [place] [address]      "

## 2023-12-22 NOTE — PATIENT INSTRUCTIONS
It was a pleasure taking part in your care today:    - Can call 3 months before 1 year (September) to schedule annual follow up    Please call the [facility] clinic with any questions or concerns at [phone].     Mary Kay Baird PA-C

## 2024-01-26 DIAGNOSIS — I10 PRIMARY HYPERTENSION: ICD-10-CM

## 2024-01-26 DIAGNOSIS — I25.798 CORONARY ARTERY DISEASE OF OTHER BYPASS GRAFT WITH STABLE ANGINA PECTORIS (H): ICD-10-CM

## 2024-01-26 RX ORDER — METOPROLOL SUCCINATE 100 MG/1
TABLET, EXTENDED RELEASE ORAL
Qty: 90 TABLET | Refills: 3 | Status: SHIPPED | OUTPATIENT
Start: 2024-01-26

## 2024-02-19 DIAGNOSIS — I10 BENIGN ESSENTIAL HYPERTENSION: ICD-10-CM

## 2024-02-19 RX ORDER — CHLORTHALIDONE 25 MG/1
TABLET ORAL
Qty: 90 TABLET | Refills: 3 | Status: SHIPPED | OUTPATIENT
Start: 2024-02-19

## 2024-02-27 ENCOUNTER — TRANSFERRED RECORDS (OUTPATIENT)
Dept: HEALTH INFORMATION MANAGEMENT | Facility: CLINIC | Age: 79
End: 2024-02-27
Payer: COMMERCIAL

## 2024-03-01 ENCOUNTER — TRANSFERRED RECORDS (OUTPATIENT)
Dept: HEALTH INFORMATION MANAGEMENT | Facility: CLINIC | Age: 79
End: 2024-03-01
Payer: COMMERCIAL

## 2024-03-05 ENCOUNTER — TRANSFERRED RECORDS (OUTPATIENT)
Dept: HEALTH INFORMATION MANAGEMENT | Facility: CLINIC | Age: 79
End: 2024-03-05
Payer: COMMERCIAL

## 2024-03-11 ENCOUNTER — TRANSFERRED RECORDS (OUTPATIENT)
Dept: HEALTH INFORMATION MANAGEMENT | Facility: CLINIC | Age: 79
End: 2024-03-11
Payer: COMMERCIAL

## 2024-03-26 ENCOUNTER — TRANSFERRED RECORDS (OUTPATIENT)
Dept: HEALTH INFORMATION MANAGEMENT | Facility: CLINIC | Age: 79
End: 2024-03-26
Payer: COMMERCIAL

## 2024-03-28 ENCOUNTER — OFFICE VISIT (OUTPATIENT)
Dept: FAMILY MEDICINE | Facility: CLINIC | Age: 79
End: 2024-03-28
Payer: COMMERCIAL

## 2024-03-28 VITALS
BODY MASS INDEX: 31.22 KG/M2 | DIASTOLIC BLOOD PRESSURE: 80 MMHG | OXYGEN SATURATION: 100 % | HEART RATE: 60 BPM | RESPIRATION RATE: 18 BRPM | SYSTOLIC BLOOD PRESSURE: 130 MMHG | TEMPERATURE: 97 F | WEIGHT: 233.4 LBS

## 2024-03-28 DIAGNOSIS — R10.13 EPIGASTRIC PAIN: ICD-10-CM

## 2024-03-28 DIAGNOSIS — Z29.11 NEED FOR VACCINATION AGAINST RESPIRATORY SYNCYTIAL VIRUS: ICD-10-CM

## 2024-03-28 DIAGNOSIS — I25.798 CORONARY ARTERY DISEASE OF OTHER BYPASS GRAFT WITH STABLE ANGINA PECTORIS (H): ICD-10-CM

## 2024-03-28 DIAGNOSIS — K21.9 GASTROESOPHAGEAL REFLUX DISEASE WITHOUT ESOPHAGITIS: ICD-10-CM

## 2024-03-28 DIAGNOSIS — Z23 NEED FOR TDAP VACCINATION: ICD-10-CM

## 2024-03-28 DIAGNOSIS — Z12.11 SCREEN FOR COLON CANCER: Primary | ICD-10-CM

## 2024-03-28 DIAGNOSIS — I10 PRIMARY HYPERTENSION: ICD-10-CM

## 2024-03-28 DIAGNOSIS — E78.2 MIXED HYPERLIPIDEMIA: ICD-10-CM

## 2024-03-28 LAB
ALBUMIN SERPL BCG-MCNC: 4.3 G/DL (ref 3.5–5.2)
ALP SERPL-CCNC: 62 U/L (ref 40–150)
ALT SERPL W P-5'-P-CCNC: 26 U/L (ref 0–70)
ANION GAP SERPL CALCULATED.3IONS-SCNC: 12 MMOL/L (ref 7–15)
AST SERPL W P-5'-P-CCNC: 23 U/L (ref 0–45)
BILIRUB SERPL-MCNC: 0.6 MG/DL
BUN SERPL-MCNC: 28.6 MG/DL (ref 8–23)
CALCIUM SERPL-MCNC: 9.8 MG/DL (ref 8.8–10.2)
CHLORIDE SERPL-SCNC: 102 MMOL/L (ref 98–107)
CHOLEST SERPL-MCNC: 237 MG/DL
CREAT SERPL-MCNC: 1.12 MG/DL (ref 0.67–1.17)
DEPRECATED HCO3 PLAS-SCNC: 25 MMOL/L (ref 22–29)
EGFRCR SERPLBLD CKD-EPI 2021: 66 ML/MIN/1.73M2
ERYTHROCYTE [DISTWIDTH] IN BLOOD BY AUTOMATED COUNT: 14.2 % (ref 10–15)
FASTING STATUS PATIENT QL REPORTED: ABNORMAL
GLUCOSE SERPL-MCNC: 102 MG/DL (ref 70–99)
HCT VFR BLD AUTO: 44.3 % (ref 40–53)
HDLC SERPL-MCNC: 44 MG/DL
HGB BLD-MCNC: 15 G/DL (ref 13.3–17.7)
LDLC SERPL CALC-MCNC: 160 MG/DL
LIPASE SERPL-CCNC: 38 U/L (ref 13–60)
MCH RBC QN AUTO: 30.3 PG (ref 26.5–33)
MCHC RBC AUTO-ENTMCNC: 33.9 G/DL (ref 31.5–36.5)
MCV RBC AUTO: 90 FL (ref 78–100)
NONHDLC SERPL-MCNC: 193 MG/DL
PLATELET # BLD AUTO: 202 10E3/UL (ref 150–450)
POTASSIUM SERPL-SCNC: 4.1 MMOL/L (ref 3.4–5.3)
PROT SERPL-MCNC: 7.3 G/DL (ref 6.4–8.3)
RBC # BLD AUTO: 4.95 10E6/UL (ref 4.4–5.9)
SODIUM SERPL-SCNC: 139 MMOL/L (ref 135–145)
TRIGL SERPL-MCNC: 167 MG/DL
WBC # BLD AUTO: 9.7 10E3/UL (ref 4–11)

## 2024-03-28 PROCEDURE — 83690 ASSAY OF LIPASE: CPT | Performed by: FAMILY MEDICINE

## 2024-03-28 PROCEDURE — 85027 COMPLETE CBC AUTOMATED: CPT | Performed by: FAMILY MEDICINE

## 2024-03-28 PROCEDURE — 99214 OFFICE O/P EST MOD 30 MIN: CPT | Performed by: FAMILY MEDICINE

## 2024-03-28 PROCEDURE — 80061 LIPID PANEL: CPT | Performed by: FAMILY MEDICINE

## 2024-03-28 PROCEDURE — 36415 COLL VENOUS BLD VENIPUNCTURE: CPT | Performed by: FAMILY MEDICINE

## 2024-03-28 PROCEDURE — 80053 COMPREHEN METABOLIC PANEL: CPT | Performed by: FAMILY MEDICINE

## 2024-03-28 PROCEDURE — 93000 ELECTROCARDIOGRAM COMPLETE: CPT | Performed by: FAMILY MEDICINE

## 2024-03-28 RX ORDER — LISINOPRIL 5 MG/1
TABLET ORAL
Qty: 90 TABLET | Refills: 3 | Status: SHIPPED | OUTPATIENT
Start: 2024-03-28

## 2024-03-28 RX ORDER — ATORVASTATIN CALCIUM 80 MG/1
80 TABLET, FILM COATED ORAL DAILY
Qty: 90 TABLET | Refills: 0 | Status: SHIPPED | OUTPATIENT
Start: 2024-03-28 | End: 2024-06-25

## 2024-03-28 NOTE — PROGRESS NOTES
Assessment & Plan   Epigastric pain  79 y/o with a 3 week h/o sharp, intermittent epigastric pain. No association with activity, food or BMs. No blood in urine or stool.  Even though Carnett's sign negative, MSK causes most likely d/t ruling out of less likely causes, such as urinary, bowel, or gastric causes.  Cardiac: EKG showed Afib as expected, no evidence of ischemic changes.  Plan to watch and wait if it resolves, and patient agreed to RTC if symptoms worsen or do not improve in a month.    - EKG 12-lead, tracing only  - Comprehensive metabolic panel  - Lipase  - CBC with platelets    Coronary artery disease of other bypass graft with stable angina pectoris (H24)  - Lipid panel reflex to direct LDL Non-fasting    Mixed hyperlipidemia  - atorvastatin (LIPITOR) 80 MG tablet  Dispense: 90 tablet; Refill: 0    Gastroesophageal reflux disease without esophagitis  - omeprazole (PRILOSEC) 20 MG DR capsule  Dispense: 180 capsule; Refill: 3    Primary hypertension--controlled  - lisinopril (ZESTRIL) 5 MG tablet  Dispense: 90 tablet; Refill: 3      No follow-ups on file.    Gisel Mireles, MS3  [employer]    Josiah Gooden is a 78 year old, presenting for the following health issues:  Hernia (Possible abdominal hernia. Noticed 3 weeks ago.  Possibly related to drug reaction)         No data to display              HPI   Giacomo Schneider is an 79 y/o male presenting today with a 3 week history of upper abd pain.  He describes it as sharp and pulling.   It is intermittent, and tends to switch location and laterality between episodes.  No known triggers such as food or bowel movements, but breathing makes it better.  He lifts weights and has no trouble with them.        Had an episode of gout in Feb, saw a podiatrist and was likely treated with prednisone.  However, he developed a rash and swelling from his neck to his ankles, for which he saw a dermatologist and was prescribed a number of  "medications.  It has improved but states it isn't completely gone and the skin around his biopsy site is \"crepe'd\".  He is worried one of these new meds may be causing his discomfort.  He is also worried it may be heart-related or hernia-related.    Endorses a decrease in hunger since Feb and diarrhea at baseline.  Denies hematuria and hematochezia.  No chest pain or SOB.  Patient Active Problem List    Diagnosis Date Noted    Primary hypertension 11/24/2021     Priority: Medium    Coronary artery disease of other bypass graft with stable angina pectoris (H24) 11/18/2021     Priority: Medium    Central retinal artery occlusion of left eye 11/18/2021     Priority: Medium     Residual peripheral vision loss in left eye.      Status post coronary artery bypass graft 05/01/2019     Priority: Medium     3 vessel CABG:  SULTANA to LAD,  Separate reverse saph to vein to LAD branch and right PDA  Ligation of atrial appendage in context of a fib  Dr Ashley Bai       History of colonic polyps 02/10/2017     Priority: Medium     Colonoscopy 1/27/2017 by [facility], Dr. Conroy, showing   Cecum:  One 2mm sessile polyp, removed  Transverse colon:  Three 3-7mm sessile polyps, removed  Sigmoid colon:  One 2mm sessile polyp, removed  Rectum:  One 4mm polyp, removed  Diverticulosis, internal hemorrhoids.        BPH (benign prostatic hyperplasia) 04/14/2015     Priority: Medium     Neg prostate bx 2009.        Impaired fasting glucose 01/28/2014     Priority: Medium    Atrial fibrillation (H) 03/20/2013     Priority: Medium       5/2019:  Ligation of atrial appendage in context of a fib.  Needs eliquis temporarily.        Basal cell carcinoma of skin 03/20/2013     Priority: Medium     Basal Cell Carcinoma Of The Skin 2009 (173.9); left cheek. excised by Dr Hernandez @ [facility]        Esophageal reflux 03/20/2013     Priority: Medium     Esophageal Reflux (530.81); benign endoscopy on EGD 6/22/04        Hyperlipidemia 03/20/2013     " Priority: Medium     Tier 1  Diagnosis updated by automated process. Provider to review and confirm.      Health Care Home 03/20/2013     Priority: Medium     Tier 1  DX V65.8 REPLACED WITH 93013 HEALTH CARE HOME (04/08/2013)      Obstructive sleep apnea 03/20/2013     Priority: Medium     Obstructive Sleep Apnea (327.23); intolerant of cpap        Spermatocele 03/20/2013     Priority: Medium     Spermatocele (608.1); left testicle confirmed by ultrasound.         Abnormal abdominal CT scan 04/14/2015     Priority: Low     Inguinal hernias noted--planning repair fall 2015  Diverticulosis  Aortic and coronary atherosclerosis  bph             Objective    /80   Pulse 60   Temp 97  F (36.1  C)   Resp 18   Wt 105.9 kg (233 lb 6.4 oz)   SpO2 100%   BMI 31.22 kg/m    Body mass index is 31.22 kg/m .    Physical Exam   GENERAL: alert and no acute distress  RESP: lungs clear to auscultation - no rales, rhonchi or wheezes  CV: regular rate with irregular rhythm  ABDOMEN: soft, nontender, no hepatosplenomegaly, no masses and bowel sounds normal.  Carnett's sign negative.  MS: no gross musculoskeletal defects noted, no edema  SKIN: Well healed surgical scars on abdomen.  Dry skin in medial thigh near previous biopsy site.  Biopsy site dressed and covered.  NEURO: Normal strength and tone, mentation intact and speech normal  PSYCH: mentation appears normal, affect normal/bright    EKG - Reviewed and interpreted by me atrial fibrillation, rate irregular but not tachycardic, unchanged from previous tracings      Preceptor Attestation:   I was present with the medical student who participated in the service and in the documentation of this note. I have verified the history and personally performed the physical exam and medical decision making. I have verified the content of the note, which accurately reflects my assessment of the patient and the plan of care.   Supervising Physician:  Giovani Ponce MD.    Signed  Electronically by: Giovani Ponce MD

## 2024-04-08 ENCOUNTER — TRANSFERRED RECORDS (OUTPATIENT)
Dept: HEALTH INFORMATION MANAGEMENT | Facility: CLINIC | Age: 79
End: 2024-04-08
Payer: COMMERCIAL

## 2024-04-10 ENCOUNTER — TRANSFERRED RECORDS (OUTPATIENT)
Dept: HEALTH INFORMATION MANAGEMENT | Facility: CLINIC | Age: 79
End: 2024-04-10
Payer: COMMERCIAL

## 2024-06-25 ENCOUNTER — TELEPHONE (OUTPATIENT)
Dept: CARDIOLOGY | Facility: CLINIC | Age: 79
End: 2024-06-25
Payer: COMMERCIAL

## 2024-06-25 DIAGNOSIS — E78.2 MIXED HYPERLIPIDEMIA: ICD-10-CM

## 2024-06-25 RX ORDER — ATORVASTATIN CALCIUM 80 MG/1
80 TABLET, FILM COATED ORAL DAILY
Qty: 90 TABLET | Refills: 1 | Status: SHIPPED | OUTPATIENT
Start: 2024-06-25

## 2024-06-25 NOTE — TELEPHONE ENCOUNTER
[facility] Call Center    Phone Message    May a detailed message be left on voicemail: yes     Reason for Call: Medication Refill Request    Has the patient contacted the pharmacy for the refill? Yes   Name of medication being requested: atorvastatin (LIPITOR) 80 MG tablet   Provider who prescribed the medication: Dr. Vee  [facility] [id number]   Date medication is needed: Patient has 5 days worth of pills.     Last visit was in 12/2023 with Mary Kay and wondering if he needs to see provider again for refills. Please reach out to patient.        Action Taken: Other: Cardio    Travel Screening: Not Applicable     Date of Service:

## 2024-06-25 NOTE — TELEPHONE ENCOUNTER
Return call to patient. Spoke to wife Portia (C2C on file) advised follow-up is due in December but we can refill medications in the meantime because he was seen within the last year in clinic.   Understanding verbalized. No other questions or concerns. -salomon

## 2024-06-29 ENCOUNTER — HEALTH MAINTENANCE LETTER (OUTPATIENT)
Age: 79
End: 2024-06-29

## 2024-10-15 ENCOUNTER — TRANSFERRED RECORDS (OUTPATIENT)
Dept: HEALTH INFORMATION MANAGEMENT | Facility: CLINIC | Age: 79
End: 2024-10-15
Payer: COMMERCIAL

## 2024-12-02 NOTE — PROGRESS NOTES
ASSESSMENT: Onychauxis, callus, hammertoe, foot pain.    PLAN: Toenails were debrided manually and mechanically x10. Callus debrided ×3. We reviewed options for managing calluses including moisturizer and filing. 15 minute visit, greater than half the time spent counseling and coordinating medical care.        SUBJECTIVE: Toenails are long, thick and painful. Painful calluses between his toes on the left foot. Last nail debridement in the clinic was February 9, 2017.     OBJECTIVE:  General: Pleasant 71 y.o. male in no acute distress.  Vascular: DP pulses are palpable. PT pulses are palpable. Pedal hair is diminished. Feet are warm to the touch.  Neuro: Sensation in the feet is grossly intact to light touch.  Derm:  Toenails are elongated, thickened and dystrophic with discoloration and subungual debris. The painful calluses between his toes were debrided down to intact skin.  Musculoskeletal: Hammertoes.      No

## 2025-01-19 DIAGNOSIS — I10 PRIMARY HYPERTENSION: ICD-10-CM

## 2025-01-19 DIAGNOSIS — I25.798 CORONARY ARTERY DISEASE OF OTHER BYPASS GRAFT WITH STABLE ANGINA PECTORIS: ICD-10-CM

## 2025-01-20 RX ORDER — METOPROLOL SUCCINATE 100 MG/1
TABLET, EXTENDED RELEASE ORAL
Qty: 90 TABLET | Refills: 3 | Status: SHIPPED | OUTPATIENT
Start: 2025-01-20

## 2025-01-20 NOTE — TELEPHONE ENCOUNTER
Name from pharmacy: METOPROLOL SUCCINATE ER 100MG ER TABLET ER 24HR         Will file in chart as: metoprolol succinate ER (TOPROL XL) 100 MG 24 hr tablet    Sig: TAKE ONE TABLET BY MOUTH ONCE DAILY AT BEDTIME    Disp: 90 tablet    Refills: 3    Start: 1/19/2025    Class: E-Prescribe    Non-formulary For: Primary hypertension, Coronary artery disease of other bypass graft with stable angina pectoris    Last ordered: 12 months ago (1/26/2024) by Giovani Ponce MD    Last refill: 10/19/2024    Rx #: [id number]    Beta-Blockers Protocol [id number]/19/2025 12:10 PM   Protocol Details Most recent blood pressure under 140/90 in past 12 months    Patient is age 6 or older    Medication is active on med list    Medication indicated for associated diagnosis    Recent (12 mo) or future (90 days) visit within the authorizing provider's specialty          BP Readings from Last 3 Encounters:   12/20/24 132/80   03/28/24 130/80   12/22/23 136/72       Kiran Hall RN, MSN

## 2025-02-18 NOTE — TELEPHONE ENCOUNTER
[facility] phone call message- general phone call:    Reason for call: He would like to know if he can take Indonethacin while he is taking him gout medication.    Action desired: call back.    Return call needed: Yes    OK to leave a message on voice mail? Yes    Advised patient to response may take up to 2 business days: Yes    Primary language: English      needed? No    Call taken on December 10, 2021 at 10:33 AM by Alessia Hearn  
Returned call.  Patient was prescribed indomethacin for a gout flare (2 tablets x 1 dose then 1 tablet every 8 hours).  Was instructed to stop medicine when pain goes away, which he thinks will be soon.  Patient is wondering if there are any drug interactions between this medication and any of his other medications.      Told him that there is one between his apixiban and indomethacin; that indomethacin can increase the bleed risk of apixiban, Told patient that it was fine to take together, just that he should take it for the shortest time frame possible. Patient reported understanding.    Genny Boles, PharmD   
MED

## 2025-02-19 DIAGNOSIS — I10 BENIGN ESSENTIAL HYPERTENSION: ICD-10-CM

## 2025-02-20 RX ORDER — CHLORTHALIDONE 25 MG/1
TABLET ORAL
Qty: 90 TABLET | Refills: 3 | Status: SHIPPED | OUTPATIENT
Start: 2025-02-20

## 2025-02-20 NOTE — TELEPHONE ENCOUNTER
Name from pharmacy: CHLORTHALIDONE 25MG TABLET          Will file in chart as: chlorthalidone (HYGROTON) 25 MG tablet    Sig: TAKE ONE TABLET BY MOUTH ONCE DAILY AT BEDTIME    Disp: 90 tablet    Refills: 3    Start: 2/19/2025    Class: E-Prescribe    Non-formulary For: Benign essential hypertension    Last ordered: 1 year ago (2/19/2024) by Giovani Ponce MD    Last refill: 11/12/2024    Rx #: [id number]    Diuretics (Including Combos) Protocol [id number]/19/2025 05:23 PM   Protocol Details Most recent blood pressure under 140/90 in past 12 months    Potassium level on file in past 12 months    Medication is active on med list and the sig matches. RN to manually verify dose and sig if red X/fail.    Medication indicated for associated diagnosis    Has GFR on file in past 12 months and most recent value is normal    Recent (12 mo) or future (90 days) visit within the authorizing provider's specialt        BP Readings from Last 3 Encounters:   12/20/24 132/80   03/28/24 130/80   12/22/23 136/72     Potassium   Date Value Ref Range Status   03/28/2024 4.1 3.4 - 5.3 mmol/L Final   10/21/2021 3.5 3.5 - 5.0 mmol/L Final   06/09/2020 3.9 3.2 - 4.6 mmol/L Final     GFR Estimate   Date Value Ref Range Status   03/28/2024 66 >60 mL/min/1.73m2 Final   02/10/2021 49 (L) >60 mL/min/1.73m2 Final   06/09/2020 74.2 >60.0 mL/min/1.7 m2 Final     GFR, ESTIMATED POCT   Date Value Ref Range Status   03/11/2022 >60 >60 mL/min/1.73m2 Final           Prescription approved per [facility] Refill Protocol.    Kiran Hall, RN, MSN

## 2025-04-21 ENCOUNTER — OFFICE VISIT (OUTPATIENT)
Dept: FAMILY MEDICINE | Facility: CLINIC | Age: 80
End: 2025-04-21
Payer: COMMERCIAL

## 2025-04-21 VITALS
HEART RATE: 55 BPM | SYSTOLIC BLOOD PRESSURE: 124 MMHG | TEMPERATURE: 97.4 F | WEIGHT: 235 LBS | DIASTOLIC BLOOD PRESSURE: 78 MMHG | RESPIRATION RATE: 16 BRPM | OXYGEN SATURATION: 100 % | BODY MASS INDEX: 31.43 KG/M2

## 2025-04-21 DIAGNOSIS — Z12.5 ENCOUNTER FOR SCREENING FOR MALIGNANT NEOPLASM OF PROSTATE: ICD-10-CM

## 2025-04-21 DIAGNOSIS — I48.11 LONGSTANDING PERSISTENT ATRIAL FIBRILLATION (H): ICD-10-CM

## 2025-04-21 DIAGNOSIS — N40.1 BENIGN PROSTATIC HYPERPLASIA WITH URINARY FREQUENCY: ICD-10-CM

## 2025-04-21 DIAGNOSIS — R35.0 BENIGN PROSTATIC HYPERPLASIA WITH URINARY FREQUENCY: ICD-10-CM

## 2025-04-21 DIAGNOSIS — I25.798 CORONARY ARTERY DISEASE OF OTHER BYPASS GRAFT WITH STABLE ANGINA PECTORIS: ICD-10-CM

## 2025-04-21 DIAGNOSIS — Z01.818 PREOP GENERAL PHYSICAL EXAM: ICD-10-CM

## 2025-04-21 DIAGNOSIS — N40.0 BENIGN PROSTATIC HYPERPLASIA WITHOUT LOWER URINARY TRACT SYMPTOMS: ICD-10-CM

## 2025-04-21 DIAGNOSIS — Z23 NEED FOR VACCINATION: Primary | ICD-10-CM

## 2025-04-21 DIAGNOSIS — I10 PRIMARY HYPERTENSION: ICD-10-CM

## 2025-04-21 LAB
ALBUMIN UR-MCNC: NEGATIVE MG/DL
ANION GAP SERPL CALCULATED.3IONS-SCNC: 12 MMOL/L (ref 7–15)
APPEARANCE UR: CLEAR
BILIRUB UR QL STRIP: NEGATIVE
BUN SERPL-MCNC: 20.7 MG/DL (ref 8–23)
CALCIUM SERPL-MCNC: 9.7 MG/DL (ref 8.8–10.4)
CHLORIDE SERPL-SCNC: 101 MMOL/L (ref 98–107)
COLOR UR AUTO: YELLOW
CREAT SERPL-MCNC: 1.03 MG/DL (ref 0.67–1.17)
EGFRCR SERPLBLD CKD-EPI 2021: 73 ML/MIN/1.73M2
GLUCOSE SERPL-MCNC: 109 MG/DL (ref 70–99)
GLUCOSE UR STRIP-MCNC: NEGATIVE MG/DL
HCO3 SERPL-SCNC: 25 MMOL/L (ref 22–29)
HGB UR QL STRIP: NEGATIVE
KETONES UR STRIP-MCNC: NEGATIVE MG/DL
LEUKOCYTE ESTERASE UR QL STRIP: NEGATIVE
NITRATE UR QL: NEGATIVE
PH UR STRIP: 6.5 [PH] (ref 5–8)
POTASSIUM SERPL-SCNC: 4.3 MMOL/L (ref 3.4–5.3)
PSA SERPL DL<=0.01 NG/ML-MCNC: 1.82 NG/ML
SODIUM SERPL-SCNC: 138 MMOL/L (ref 135–145)
SP GR UR STRIP: 1.02 (ref 1–1.03)
UROBILINOGEN UR STRIP-ACNC: 0.2 E.U./DL

## 2025-04-21 PROCEDURE — 3074F SYST BP LT 130 MM HG: CPT | Performed by: FAMILY MEDICINE

## 2025-04-21 PROCEDURE — G0103 PSA SCREENING: HCPCS | Performed by: FAMILY MEDICINE

## 2025-04-21 PROCEDURE — 3078F DIAST BP <80 MM HG: CPT | Performed by: FAMILY MEDICINE

## 2025-04-21 PROCEDURE — 36415 COLL VENOUS BLD VENIPUNCTURE: CPT | Performed by: FAMILY MEDICINE

## 2025-04-21 PROCEDURE — 99214 OFFICE O/P EST MOD 30 MIN: CPT | Performed by: FAMILY MEDICINE

## 2025-04-21 PROCEDURE — 80048 BASIC METABOLIC PNL TOTAL CA: CPT | Performed by: FAMILY MEDICINE

## 2025-04-21 PROCEDURE — 81003 URINALYSIS AUTO W/O SCOPE: CPT | Performed by: FAMILY MEDICINE

## 2025-04-21 RX ORDER — LISINOPRIL 10 MG/1
10 TABLET ORAL DAILY
Qty: 90 TABLET | Refills: 3 | Status: SHIPPED | OUTPATIENT
Start: 2025-04-21

## 2025-04-21 RX ORDER — FINASTERIDE 5 MG/1
5 TABLET, FILM COATED ORAL DAILY
Qty: 90 TABLET | Refills: 3 | Status: SHIPPED | OUTPATIENT
Start: 2025-04-21

## 2025-04-21 NOTE — PROGRESS NOTES
Preoperative Evaluation  [facility] [address] [place] [address]  Phone: [phone]  Fax: [phone]  Primary Provider: Giovani Ponce MD  Pre-op Performing Provider: Giovani Ponce MD  Apr 21, 2025 4/16/2025   Surgical Information   What procedure is being done? Pre-op for cataract surgery   Facility or Hospital where procedure/surgery will be performed: [facility]   Who is doing the procedure / surgery? Dr. Lam   Date of surgery / procedure: April 22, 2025   Time of surgery / procedure: TBD   Where do you plan to recover after surgery? at home with family     Fax number for surgical facility:     Assessment & Plan     The proposed surgical procedure is considered LOW risk.    Need for vaccination  Rec @ pharmacy  - Tdap, tetanus-diptheria-acell pertussis, (BOOSTRIX) 5-2.5-18.5 LF-MCG/0.5 JAY JAY injection; Inject 0.5 mLs into the muscle once for 1 dose.    Primary hypertension  Stop chlorthalidone and increase lisinopril to 10 mg daily.  Keep metoprolol  - BASIC METABOLIC PANEL; Future  - lisinopril (ZESTRIL) 10 MG tablet; Take 1 tablet (10 mg) by mouth daily.    Longstanding persistent atrial fibrillation (H)  S/o atrial appendage ligation    Coronary artery disease of other bypass graft with stable angina pectoris  Cont asa and metoprolol    Preop general physical exam  Ok for cataract surger    Benign prostatic hyperplasia with urinary frequency    - Urinalysis, Micro If; Future    Benign prostatic hyperplasia without lower urinary tract symptoms  Start finasteride 5 mg daily  - Prostate spec antigen screen; Future    Encounter for screening for malignant neoplasm of prostate  Get psa  - Prostate spec antigen screen; Future       Implanted Device   - Type of device:   Patient advised to bring device information on day of surgery.       - No identified additional risk factors other than previously addressed         Recommendation  Approval  given to proceed with proposed procedure, without further diagnostic evaluation.    Follow-up       Subjective   Giacomo is a 79 year old, presenting for the following:  Other (Cataract surgery 04/22/2025)        HPI: difficulty with cloudy vision on right eye    Difficulty sleeping and gets up every hour to use the bathroom. Takes naps during the day amd the patient's wife is concerned about him driving if he's tired. He states he's been feeling like his bladder hasn't been emptying fully for the past 6 months. He also has an intense sense of urgency to void.         4/16/2025   Pre-Op Questionnaire   Have you ever had a heart attack or stroke? No   Have you ever had surgery on your heart or blood vessels, such as a stent placement, a coronary artery bypass, or surgery on an artery in your head, neck, heart, or legs? (!) YES :  had bypass.     Do you have chest pain with activity? No   Do you have a history of heart failure? No   Do you currently have a cold, bronchitis or symptoms of other infection? No   Do you have a cough, shortness of breath, or wheezing? No   Do you or anyone in your family have previous history of blood clots? No   Do you or does anyone in your family have a serious bleeding problem such as prolonged bleeding following surgeries or cuts? No   Have you ever had problems with anemia or been told to take iron pills? No   Have you had any abnormal blood loss such as black, tarry or bloody stools? No   Have you ever had a blood transfusion? No   Are you willing to have a blood transfusion if it is medically needed before, during, or after your surgery? Yes   Have you or any of your relatives ever had problems with anesthesia? No   Do you have sleep apnea, excessive snoring or daytime drowsiness? No   Do you have any artifical heart valves or other implanted medical devices like a pacemaker, defibrillator, or continuous glucose monitor? (!) UNKNOWN   Do you have artificial joints? No   Are you  allergic to latex? No     Advance Care Planning    Advance care planning document is on file but is outdated.  Patient encouraged to update or provider to update POLST.    Preoperative Review of    reviewed - no record of controlled substances prescribed.          Patient Active Problem List    Diagnosis Date Noted    Primary hypertension 11/24/2021     Priority: Medium    Coronary artery disease of other bypass graft with stable angina pectoris 11/18/2021     Priority: Medium    Central retinal artery occlusion of left eye 11/18/2021     Priority: Medium     Residual peripheral vision loss in left eye.      Status post coronary artery bypass graft 05/01/2019     Priority: Medium     3 vessel CABG:  SULTANA to LAD,  Separate reverse saph to vein to LAD branch and right PDA  Ligation of atrial appendage in context of a fib  Dr Ashley Bai       History of colonic polyps 02/10/2017     Priority: Medium     Colonoscopy 1/27/2017 by [facility], Dr. Conroy, showing   Cecum:  One 2mm sessile polyp, removed  Transverse colon:  Three 3-7mm sessile polyps, removed  Sigmoid colon:  One 2mm sessile polyp, removed  Rectum:  One 4mm polyp, removed  Diverticulosis, internal hemorrhoids.        Abnormal abdominal CT scan 04/14/2015     Priority: Medium     Inguinal hernias noted--planning repair fall 2015  Diverticulosis  Aortic and coronary atherosclerosis  bph      BPH (benign prostatic hyperplasia) 04/14/2015     Priority: Medium     Neg prostate bx 2009.        Impaired fasting glucose 01/28/2014     Priority: Medium    Atrial fibrillation (H) 03/20/2013     Priority: Medium       5/2019:  Ligation of atrial appendage in context of a fib.  Needs eliquis temporarily.        Basal cell carcinoma of skin 03/20/2013     Priority: Medium     Basal Cell Carcinoma Of The Skin 2009 (173.9); left cheek. excised by Dr [facility]        Esophageal reflux 03/20/2013     Priority: Medium     Esophageal Reflux (530.81); benign  endoscopy on EGD 6/22/04        Hyperlipidemia 03/20/2013     Priority: Medium     Tier 1  Diagnosis updated by automated process. Provider to review and confirm.      Obstructive sleep apnea 03/20/2013     Priority: Medium     Obstructive Sleep Apnea (327.23); intolerant of cpap        Spermatocele 03/20/2013     Priority: Medium     Spermatocele (608.1); left testicle confirmed by ultrasound.           Past Medical History:   Diagnosis Date    Atrial fibrillation (H)     BPH (benign prostatic hyperplasia)     CAD (coronary artery disease)     Cancer (H)     Cancer (H)     basal cell carcinoma of skin    Coronary artery disease of other bypass graft with stable angina pectoris 11/18/2021    Coronary atherosclerosis     Diverticulosis     GERD (gastroesophageal reflux disease)     H/O cardiovascular stress test     negative 2012    Hyperlipemia     Impaired fasting glucose     Low back pain     Primary hypertension 11/24/2021    Sleep apnea     unable to tolerate CPAP    Spermatocele      Past Surgical History:   Procedure Laterality Date    CV CORONARY ANGIOGRAM N/A 4/22/2019    Procedure: Coronary Angiogram;  Surgeon: Qamar Martin MD;  Location: [facility];  Service: Cardiology    LAPAROSCOPIC HERNIORRHAPHY INGUINAL Bilateral 12/3/2015    Procedure: LAPAROSCOPIC BILATERAL INGUINAL HERNIA REPAIR WITH BILATERAL MESH;  Surgeon: Bran Puckett MD;  Location: [facility] OR;  Service:     SOFT TISSUE SURGERY      basal cell cancer     Current Outpatient Medications   Medication Sig Dispense Refill    aspirin 81 MG EC tablet Take 81 mg by mouth daily      atorvastatin (LIPITOR) 80 MG tablet Take 1 tablet (80 mg) by mouth daily. 90 tablet 3    Calcium Carbonate-Vit D-Min (CALCIUM 600+D PLUS MINERALS) 600-400 MG-UNIT TABS Take 1 tablet by mouth daily.      chlorthalidone (HYGROTON) 25 MG tablet TAKE ONE TABLET BY MOUTH ONCE DAILY AT BEDTIME 90 tablet 3    Glucosamine-Chondroitin 250-200 MG TABS  Take 2 tablets by mouth daily.      hydrOXYzine HCl (ATARAX) 25 MG tablet Take 25 mg by mouth every 6 hours as needed for itching.      indomethacin (INDOCIN) 50 MG capsule Take 50 mg by mouth as needed for other (rash).      ketoconazole (NIZORAL) 2 % external cream Apply topically as needed for irritation.      lisinopril (ZESTRIL) 5 MG tablet TAKE ONE (1) TABLET (5 MG) BY MOUTH DAILY 90 tablet 3    metoprolol succinate ER (TOPROL XL) 100 MG 24 hr tablet TAKE ONE TABLET BY MOUTH ONCE DAILY AT BEDTIME 90 tablet 3    omeprazole (PRILOSEC) 20 MG DR capsule Take 1 capsule (20 mg) by mouth 2 times daily 180 capsule 3    terbinafine (LAMISIL) 250 MG tablet Take 250 mg by mouth daily as needed.      hydrocortisone valerate (WEST-SAVANAH) 0.2 % external ointment Apply topically daily. (Patient not taking: Reported on 2025)      Multiple Vitamins-Minerals (ONCOVITE) TABS Take 1 tablet by mouth (Patient not taking: Reported on 2023)      triamcinolone (KENALOG) 0.1 % external cream Apply topically 2 times daily as needed for irritation (Patient not taking: Reported on 3/28/2024) 45 g 1       Allergies   Allergen Reactions    Budesonide     Sulfa Antibiotics Unknown    Thimerosal (Thiomersal)         Social History     Tobacco Use    Smoking status: Former     Current packs/day: 0.00     Types: Cigars, Cigarettes     Quit date: 2021     Years since quittin.2    Smokeless tobacco: Former     Types: Chew    Tobacco comments:     1 cigar per day   Substance Use Topics    Alcohol use: Yes     Comment: Alcoholic Drinks/day: rarely     Family History   Problem Relation Age of Onset    Hypertension Mother     Coronary Artery Disease Mother     Unknown/Adopted Sister     Diabetes Father     Gastrointestinal Disease Brother         pancreatic cancer in late 80s    Cancer No family hx of     Hyperlipidemia No family hx of     Cerebrovascular Disease No family hx of     Breast Cancer No family hx of     Colon Cancer No  "family hx of     Prostate Cancer No family hx of     Other Cancer No family hx of     Depression No family hx of     Anxiety Disorder No family hx of     Mental Illness No family hx of     Substance Abuse No family hx of     Anesthesia Reaction No family hx of     Asthma No family hx of     Osteoporosis No family hx of     Genetic Disorder No family hx of     Thyroid Disease No family hx of     Obesity No family hx of     Hypertension Father     No Known Problems Mother     No Known Problems Sister     No Known Problems Brother     No Known Problems Maternal Aunt     No Known Problems Maternal Uncle     No Known Problems Paternal Aunt     No Known Problems Paternal Uncle     No Known Problems Maternal Grandmother     No Known Problems Maternal Grandfather     No Known Problems Paternal Grandmother     No Known Problems Paternal Grandfather     Clotting Disorder No family hx of     Diabetes No family hx of     Dislocations No family hx of     Psoriasis No family hx of     Rashes/Skin Problems No family hx of     Severe sprains No family hx of     Ulcerative Colitis No family hx of      History   Drug Use No             Review of Systems  Constitutional, neuro, ENT, endocrine, pulmonary, cardiac, gastrointestinal, genitourinary, musculoskeletal, integument and psychiatric systems are negative, except as otherwise noted.  Nocturia q hour  Daytime hypersomnolence c/w known, untreated CRAMITA    Objective    /78   Pulse 55   Temp 97.4  F (36.3  C) (Oral)   Resp 16   Wt 106.6 kg (235 lb)   SpO2 100%   BMI 31.43 kg/m     Estimated body mass index is 31.43 kg/m  as calculated from the following:    Height as of 11/24/21: 1.842 m (6' 0.5\").    Weight as of this encounter: 106.6 kg (235 lb).  Physical Exam  GENERAL: alert and no distress  EYES: Eyes grossly normal to inspection, PERRL and conjunctivae and sclerae normal  HENT: normal cephalic/atraumatic, nose and mouth without ulcers or lesions, oropharynx clear, oral " "mucous membranes moist, and right TM perf  NECK: no adenopathy, no asymmetry, masses, or scars  RESP: lungs clear to auscultation - no rales, rhonchi or wheezes  CV: irregularly irregular rhythm, normal S1 S2, no S3 or S4, no murmur, click or rub, peripheral pulses strong, and no peripheral edema  ABDOMEN: soft, nontender, no hepatosplenomegaly, no masses and bowel sounds normal  RECTAL (male): normal sphincter tone, no rectal masses, prostate normal size, smooth, nontender without nodules or masses  MS: no gross musculoskeletal defects noted, no edema  SKIN: no suspicious lesions or rashes  PSYCH: mentation appears normal, affect normal/bright    No results for input(s): \"HGB\", \"PLT\", \"INR\", \"NA\", \"POTASSIUM\", \"CR\", \"A1C\" in the last 8760 hours.     Diagnostics  Labs pending at this time.  Results will be reviewed when available.   No EKG required for low risk surgery (cataract, skin procedure, breast biopsy, etc).    Revised Cardiac Risk Index (RCRI)  The patient has the following serious cardiovascular risks for perioperative complications:   - No serious cardiac risks = 0 points     RCRI Interpretation: 0 points: Class I (very low risk - 0.4% complication rate)         Signed Electronically by: Giovani Ponce MD  A copy of this evaluation report is provided to the requesting physician.        "

## 2025-04-21 NOTE — PATIENT INSTRUCTIONS
Please send us a health care directive.      Increase lisinopril to 10 mg daily  Stop chlorthalidone

## 2025-04-23 NOTE — RESULT ENCOUNTER NOTE
Mehrdad  Your labs are fine.  Your PSA suggests your bladder symptoms are not from prostate cancer, but are more likely from prostate enlargemnt.  Your urine test is fine, too  Stopping the chlorthalidone may help some of the urinary symptoms right away.  The finasteride should kick in over a few months.  Please follow-up in 4-6 weeks to recheck your BP and your symptoms.  Hope your cataract surgery went well.  CF

## 2025-05-08 ENCOUNTER — TELEPHONE (OUTPATIENT)
Dept: FAMILY MEDICINE | Facility: CLINIC | Age: 80
End: 2025-05-08
Payer: COMMERCIAL

## 2025-05-08 NOTE — TELEPHONE ENCOUNTER
Symptoms    Describe your symptoms: Blood pressure is high 161/92 and ankles are swollen with new medications    Any pain: No    How long have you been having symptoms: 2  days    Have you been seen for this:  No    Appointment offered?: No    Triage offered?: Yes: would like a call back before noon      Preferred Pharmacy:   [facility] - [place], [place] - [address]  [address]  [facility] [address]  Phone: [phone] Fax: [phone]      Could we send this information to you in exozet or would you prefer to receive a phone call?:   Patient would prefer a phone call   Okay to leave a detailed message?: Yes at Cell number on file:    Telephone Information:   Mobile [phone]         Does this patient currently have active insurance coverage?  Yes, Pt has active insurance coverage.     Does patient or caller know when to expect a call? Yes, Nurses will return call within 2-3 business hours.    Miladis Chi on 5/8/2025 at 8:08 AM

## 2025-05-09 NOTE — TELEPHONE ENCOUNTER
Portia calling again about patient. Patient is now reporting increased fatigue/trouble breathing on exertion such as going up the stairs, and thinks his headaches are worse. She would like to know if PCP can prescribe something asap.     Routed to PCP.    Kiran Hall, RN, MSN

## 2025-05-09 NOTE — TELEPHONE ENCOUNTER
Spoke to Dr. Ponce, Pt to be restarted on water pill chlorthalidone (HYGROTON) at half dose of 12.5 MG tablet to help with symptoms. Advised med changes to patient and he is agreeable to plan. It terms of his BPH and urinary symptoms, he states he was not having urinary issues after stopping the 25MG clorthalidone pill but was unable to tell if the finasteride was helping yet. His main concerns were elevated BP since post-op Cataract surgery. Advised to continue BP medication regimen. Pt scheduled to see PCP 5/22/25.    Kiran Hall, RN, MSN

## 2025-05-22 ENCOUNTER — OFFICE VISIT (OUTPATIENT)
Dept: FAMILY MEDICINE | Facility: CLINIC | Age: 80
End: 2025-05-22
Payer: COMMERCIAL

## 2025-05-22 VITALS
OXYGEN SATURATION: 97 % | DIASTOLIC BLOOD PRESSURE: 77 MMHG | SYSTOLIC BLOOD PRESSURE: 133 MMHG | HEART RATE: 59 BPM | TEMPERATURE: 98.4 F | WEIGHT: 233.8 LBS | BODY MASS INDEX: 31.27 KG/M2 | RESPIRATION RATE: 16 BRPM

## 2025-05-22 DIAGNOSIS — Z12.11 SCREEN FOR COLON CANCER: Primary | ICD-10-CM

## 2025-05-22 DIAGNOSIS — I10 BENIGN ESSENTIAL HYPERTENSION: ICD-10-CM

## 2025-05-22 DIAGNOSIS — Z23 NEED FOR VACCINATION: ICD-10-CM

## 2025-05-22 DIAGNOSIS — I48.11 LONGSTANDING PERSISTENT ATRIAL FIBRILLATION (H): ICD-10-CM

## 2025-05-22 DIAGNOSIS — K21.9 GASTROESOPHAGEAL REFLUX DISEASE WITHOUT ESOPHAGITIS: ICD-10-CM

## 2025-05-22 PROCEDURE — 3075F SYST BP GE 130 - 139MM HG: CPT | Performed by: FAMILY MEDICINE

## 2025-05-22 PROCEDURE — 99214 OFFICE O/P EST MOD 30 MIN: CPT | Performed by: FAMILY MEDICINE

## 2025-05-22 PROCEDURE — 3078F DIAST BP <80 MM HG: CPT | Performed by: FAMILY MEDICINE

## 2025-05-22 RX ORDER — OMEPRAZOLE 20 MG/1
20 CAPSULE, DELAYED RELEASE ORAL 2 TIMES DAILY
Qty: 180 CAPSULE | Refills: 3 | Status: SHIPPED | OUTPATIENT
Start: 2025-05-22

## 2025-05-22 RX ORDER — CHLORTHALIDONE 25 MG/1
12.5 TABLET ORAL AT BEDTIME
Qty: 45 TABLET | Refills: 3 | Status: SHIPPED | OUTPATIENT
Start: 2025-05-22

## 2025-05-22 NOTE — PROGRESS NOTES
Patient Active Problem List    Diagnosis Date Noted    Primary hypertension 11/24/2021     Priority: Medium    Coronary artery disease of other bypass graft with stable angina pectoris 11/18/2021     Priority: Medium    Central retinal artery occlusion of left eye 11/18/2021     Priority: Medium     Residual peripheral vision loss in left eye.      Status post coronary artery bypass graft 05/01/2019     Priority: Medium     3 vessel CABG:  SULTANA to LAD,  Separate reverse saph to vein to LAD branch and right PDA  Ligation of atrial appendage in context of a fib  Dr Ashley Bai       History of colonic polyps 02/10/2017     Priority: Medium     Colonoscopy 1/27/2017 by [facility], Dr. Conroy, showing   Cecum:  One 2mm sessile polyp, removed  Transverse colon:  Three 3-7mm sessile polyps, removed  Sigmoid colon:  One 2mm sessile polyp, removed  Rectum:  One 4mm polyp, removed  Diverticulosis, internal hemorrhoids.        BPH (benign prostatic hyperplasia) 04/14/2015     Priority: Medium     Neg prostate bx 2009.        Impaired fasting glucose 01/28/2014     Priority: Medium    Atrial fibrillation (H) 03/20/2013     Priority: Medium       5/2019:  Ligation of atrial appendage in context of a fib.  Needs eliquis temporarily.        Basal cell carcinoma of skin 03/20/2013     Priority: Medium     Basal Cell Carcinoma Of The Skin 2009 (173.9); left cheek. excised by Dr Hernandez @ [facility]        Esophageal reflux 03/20/2013     Priority: Medium     Esophageal Reflux (530.81); benign endoscopy on EGD 6/22/04        Hyperlipidemia 03/20/2013     Priority: Medium     Tier 1  Diagnosis updated by automated process. Provider to review and confirm.      Obstructive sleep apnea 03/20/2013     Priority: Medium     Obstructive Sleep Apnea (327.23); intolerant of cpap        Spermatocele 03/20/2013     Priority: Medium     Spermatocele (608.1); left testicle confirmed by ultrasound.         Abnormal abdominal CT scan 04/14/2015      Priority: Low     Inguinal hernias noted--planning repair fall 2015  Diverticulosis  Aortic and coronary atherosclerosis  bph       There are no exam notes on file for this visit.  Chief Complaint   Patient presents with    Other     BP check   Med check   Gained 15 pounds In 5 days after getting allergic reaction from some eye drops . Had swollen legs at the time and wants to recheck the meds to know what happeened   Mehrdad is here for follow-up of his hypertension and nocturia.  His nocturia improved I stopped his diuretics but his blood pressure went up and he had extensive edema.  I restarted chlorthalidone at 12 and half milligrams daily and he is continuing his ACE inhibitor.  He tolerates both well.  The edema has resolved.  He is not complaining of chest pain or shortness of breath.  No orthopnea or PND.  Blood pressures at home were reviewed and are all at goal in the last week.  He still urinating excessively at night.  He is vague about the quantity but is willing to live with it for now.  He is tolerating finasteride without any difficulty.  Blood pressure 133/77, pulse 59, temperature 98.4  F (36.9  C), temperature source Oral, resp. rate 16, weight 106.1 kg (233 lb 12.8 oz), SpO2 97%.  No results found for any visits on 05/22/25.  Chest clear.  Heart regular rate and rhythm normal S1-S2 no murmur.  Extremities no edema.    Assessment/plan  Hypertension now controlled.  Continue lisinopril 10 mg daily, chlorthalidone 12.5 mg daily, and metoprolol 100 mg daily.  Chlorthalidone  were refilled today.  Reviewed most recent BMP which was normal so I did not repeat that today   BPH: Discussed that finasteride may take some time to resolve his symptoms but we should reassess this in about 6 months.  Immunizations: Tdap recommended.  Paroxysmal atrial fibrillation.  Pulse regular today.  His home evaluation has suggested that he still fluctuates between A-fib and sinus rhythm.  He is on aspirin.  He had a Watchman  procedure

## 2025-06-18 ENCOUNTER — TRANSFERRED RECORDS (OUTPATIENT)
Dept: HEALTH INFORMATION MANAGEMENT | Facility: CLINIC | Age: 80
End: 2025-06-18
Payer: COMMERCIAL

## 2025-07-13 ENCOUNTER — HEALTH MAINTENANCE LETTER (OUTPATIENT)
Age: 80
End: 2025-07-13